# Patient Record
Sex: FEMALE | Race: ASIAN | Employment: OTHER | URBAN - METROPOLITAN AREA
[De-identification: names, ages, dates, MRNs, and addresses within clinical notes are randomized per-mention and may not be internally consistent; named-entity substitution may affect disease eponyms.]

---

## 2017-04-07 ENCOUNTER — APPOINTMENT (OUTPATIENT)
Dept: CT IMAGING | Facility: CLINIC | Age: 77
DRG: 282 | End: 2017-04-07
Attending: EMERGENCY MEDICINE
Payer: COMMERCIAL

## 2017-04-07 ENCOUNTER — HOSPITAL ENCOUNTER (INPATIENT)
Facility: CLINIC | Age: 77
LOS: 3 days | Discharge: HOME | DRG: 282 | End: 2017-04-10
Attending: EMERGENCY MEDICINE | Admitting: INTERNAL MEDICINE
Payer: COMMERCIAL

## 2017-04-07 DIAGNOSIS — I30.9 ACUTE PERICARDITIS, UNSPECIFIED TYPE: Primary | ICD-10-CM

## 2017-04-07 DIAGNOSIS — R79.89 ELEVATED TROPONIN: ICD-10-CM

## 2017-04-07 DIAGNOSIS — R07.9 CHEST PAIN, UNSPECIFIED TYPE: ICD-10-CM

## 2017-04-07 DIAGNOSIS — I10 ESSENTIAL HYPERTENSION: ICD-10-CM

## 2017-04-07 DIAGNOSIS — K21.9 GASTROESOPHAGEAL REFLUX DISEASE WITHOUT ESOPHAGITIS: ICD-10-CM

## 2017-04-07 LAB
ALBUMIN SERPL-MCNC: 2.8 G/DL (ref 3.4–5)
ALBUMIN UR-MCNC: NEGATIVE MG/DL
ALP SERPL-CCNC: 64 U/L (ref 40–150)
ALT SERPL W P-5'-P-CCNC: 18 U/L (ref 0–50)
ANION GAP SERPL CALCULATED.3IONS-SCNC: 9 MMOL/L (ref 3–14)
APPEARANCE UR: CLEAR
AST SERPL W P-5'-P-CCNC: 18 U/L (ref 0–45)
BASOPHILS # BLD AUTO: 0 10E9/L (ref 0–0.2)
BASOPHILS NFR BLD AUTO: 0.1 %
BILIRUB SERPL-MCNC: 0.5 MG/DL (ref 0.2–1.3)
BILIRUB UR QL STRIP: NEGATIVE
BUN SERPL-MCNC: 16 MG/DL (ref 7–30)
CALCIUM SERPL-MCNC: 8.4 MG/DL (ref 8.5–10.1)
CHLORIDE SERPL-SCNC: 103 MMOL/L (ref 94–109)
CO2 SERPL-SCNC: 27 MMOL/L (ref 20–32)
COLOR UR AUTO: YELLOW
CREAT SERPL-MCNC: 0.66 MG/DL (ref 0.52–1.04)
DIFFERENTIAL METHOD BLD: NORMAL
EOSINOPHIL # BLD AUTO: 0.1 10E9/L (ref 0–0.7)
EOSINOPHIL NFR BLD AUTO: 1.6 %
ERYTHROCYTE [DISTWIDTH] IN BLOOD BY AUTOMATED COUNT: 12.9 % (ref 10–15)
GFR SERPL CREATININE-BSD FRML MDRD: 88 ML/MIN/1.7M2
GLUCOSE SERPL-MCNC: 226 MG/DL (ref 70–99)
GLUCOSE UR STRIP-MCNC: NEGATIVE MG/DL
HCT VFR BLD AUTO: 36 % (ref 35–47)
HGB BLD-MCNC: 11.9 G/DL (ref 11.7–15.7)
HGB UR QL STRIP: NEGATIVE
IMM GRANULOCYTES # BLD: 0 10E9/L (ref 0–0.4)
IMM GRANULOCYTES NFR BLD: 0.4 %
INR PPP: 0.92 (ref 0.86–1.14)
KETONES UR STRIP-MCNC: NEGATIVE MG/DL
LEUKOCYTE ESTERASE UR QL STRIP: NEGATIVE
LIPASE SERPL-CCNC: 369 U/L (ref 73–393)
LYMPHOCYTES # BLD AUTO: 1.8 10E9/L (ref 0.8–5.3)
LYMPHOCYTES NFR BLD AUTO: 21.9 %
MCH RBC QN AUTO: 29.7 PG (ref 26.5–33)
MCHC RBC AUTO-ENTMCNC: 33.1 G/DL (ref 31.5–36.5)
MCV RBC AUTO: 90 FL (ref 78–100)
MONOCYTES # BLD AUTO: 0.7 10E9/L (ref 0–1.3)
MONOCYTES NFR BLD AUTO: 7.9 %
MUCOUS THREADS #/AREA URNS LPF: PRESENT /LPF
NEUTROPHILS # BLD AUTO: 5.7 10E9/L (ref 1.6–8.3)
NEUTROPHILS NFR BLD AUTO: 68.1 %
NITRATE UR QL: NEGATIVE
NRBC # BLD AUTO: 0 10*3/UL
NRBC BLD AUTO-RTO: 0 /100
NT-PROBNP SERPL-MCNC: 307 PG/ML (ref 0–1800)
PH UR STRIP: 7 PH (ref 5–7)
PLATELET # BLD AUTO: 178 10E9/L (ref 150–450)
POTASSIUM SERPL-SCNC: 3.1 MMOL/L (ref 3.4–5.3)
PROT SERPL-MCNC: 7 G/DL (ref 6.8–8.8)
RBC # BLD AUTO: 4.01 10E12/L (ref 3.8–5.2)
RBC #/AREA URNS AUTO: <1 /HPF (ref 0–2)
SODIUM SERPL-SCNC: 139 MMOL/L (ref 133–144)
SP GR UR STRIP: 1.01 (ref 1–1.03)
TROPONIN I SERPL-MCNC: 0.1 UG/L (ref 0–0.04)
URN SPEC COLLECT METH UR: ABNORMAL
UROBILINOGEN UR STRIP-MCNC: 0 MG/DL (ref 0–2)
WBC # BLD AUTO: 8.4 10E9/L (ref 4–11)
WBC #/AREA URNS AUTO: 4 /HPF (ref 0–2)

## 2017-04-07 PROCEDURE — 81001 URINALYSIS AUTO W/SCOPE: CPT | Performed by: EMERGENCY MEDICINE

## 2017-04-07 PROCEDURE — 84484 ASSAY OF TROPONIN QUANT: CPT | Performed by: EMERGENCY MEDICINE

## 2017-04-07 PROCEDURE — 96375 TX/PRO/DX INJ NEW DRUG ADDON: CPT

## 2017-04-07 PROCEDURE — 93005 ELECTROCARDIOGRAM TRACING: CPT

## 2017-04-07 PROCEDURE — 99285 EMERGENCY DEPT VISIT HI MDM: CPT | Mod: 25

## 2017-04-07 PROCEDURE — 25500064 ZZH RX 255 OP 636: Performed by: EMERGENCY MEDICINE

## 2017-04-07 PROCEDURE — 83690 ASSAY OF LIPASE: CPT | Performed by: EMERGENCY MEDICINE

## 2017-04-07 PROCEDURE — 96361 HYDRATE IV INFUSION ADD-ON: CPT

## 2017-04-07 PROCEDURE — 74177 CT ABD & PELVIS W/CONTRAST: CPT

## 2017-04-07 PROCEDURE — 83880 ASSAY OF NATRIURETIC PEPTIDE: CPT | Performed by: EMERGENCY MEDICINE

## 2017-04-07 PROCEDURE — 25000132 ZZH RX MED GY IP 250 OP 250 PS 637: Performed by: EMERGENCY MEDICINE

## 2017-04-07 PROCEDURE — 85610 PROTHROMBIN TIME: CPT | Performed by: EMERGENCY MEDICINE

## 2017-04-07 PROCEDURE — 71260 CT THORAX DX C+: CPT

## 2017-04-07 PROCEDURE — 12000007 ZZH R&B INTERMEDIATE

## 2017-04-07 PROCEDURE — 25000128 H RX IP 250 OP 636: Performed by: EMERGENCY MEDICINE

## 2017-04-07 PROCEDURE — 85025 COMPLETE CBC W/AUTO DIFF WBC: CPT | Performed by: EMERGENCY MEDICINE

## 2017-04-07 PROCEDURE — 80053 COMPREHEN METABOLIC PANEL: CPT | Performed by: EMERGENCY MEDICINE

## 2017-04-07 PROCEDURE — 96374 THER/PROPH/DIAG INJ IV PUSH: CPT

## 2017-04-07 RX ORDER — POTASSIUM CHLORIDE 1500 MG/1
20-40 TABLET, EXTENDED RELEASE ORAL
Status: DISCONTINUED | OUTPATIENT
Start: 2017-04-07 | End: 2017-04-10 | Stop reason: HOSPADM

## 2017-04-07 RX ORDER — PROCHLORPERAZINE 25 MG
12.5 SUPPOSITORY, RECTAL RECTAL EVERY 12 HOURS PRN
Status: DISCONTINUED | OUTPATIENT
Start: 2017-04-07 | End: 2017-04-10 | Stop reason: HOSPADM

## 2017-04-07 RX ORDER — CODEINE PHOSPHATE AND GUAIFENESIN 10; 100 MG/5ML; MG/5ML
1-2 SOLUTION ORAL 3 TIMES DAILY PRN
Status: ON HOLD | COMMUNITY
End: 2017-11-02

## 2017-04-07 RX ORDER — ONDANSETRON 2 MG/ML
4 INJECTION INTRAMUSCULAR; INTRAVENOUS EVERY 30 MIN PRN
Status: DISCONTINUED | OUTPATIENT
Start: 2017-04-07 | End: 2017-04-08 | Stop reason: CLARIF

## 2017-04-07 RX ORDER — ZOLPIDEM TARTRATE 5 MG/1
5 TABLET ORAL
Status: DISCONTINUED | OUTPATIENT
Start: 2017-04-07 | End: 2017-04-10 | Stop reason: HOSPADM

## 2017-04-07 RX ORDER — ACETAMINOPHEN 325 MG/1
325-650 TABLET ORAL EVERY 4 HOURS PRN
Status: DISCONTINUED | OUTPATIENT
Start: 2017-04-07 | End: 2017-04-10 | Stop reason: HOSPADM

## 2017-04-07 RX ORDER — ERLOTINIB HYDROCHLORIDE 100 MG/1
100 TABLET, FILM COATED ORAL EVERY OTHER DAY
COMMUNITY

## 2017-04-07 RX ORDER — BISACODYL 5 MG
5-15 TABLET, DELAYED RELEASE (ENTERIC COATED) ORAL DAILY PRN
Status: DISCONTINUED | OUTPATIENT
Start: 2017-04-07 | End: 2017-04-10 | Stop reason: HOSPADM

## 2017-04-07 RX ORDER — CHLORAL HYDRATE 500 MG
1 CAPSULE ORAL DAILY
COMMUNITY

## 2017-04-07 RX ORDER — ALBUTEROL SULFATE 90 UG/1
1-2 AEROSOL, METERED RESPIRATORY (INHALATION) EVERY 4 HOURS PRN
COMMUNITY

## 2017-04-07 RX ORDER — ASPIRIN 325 MG
325 TABLET ORAL ONCE
Status: COMPLETED | OUTPATIENT
Start: 2017-04-07 | End: 2017-04-07

## 2017-04-07 RX ORDER — MORPHINE SULFATE 4 MG/ML
4 INJECTION, SOLUTION INTRAMUSCULAR; INTRAVENOUS
Status: DISCONTINUED | OUTPATIENT
Start: 2017-04-07 | End: 2017-04-08 | Stop reason: CLARIF

## 2017-04-07 RX ORDER — HYDROMORPHONE HCL/0.9% NACL/PF 0.2MG/0.2
0.2 SYRINGE (ML) INTRAVENOUS
Status: DISCONTINUED | OUTPATIENT
Start: 2017-04-07 | End: 2017-04-10 | Stop reason: HOSPADM

## 2017-04-07 RX ORDER — CETIRIZINE HYDROCHLORIDE 10 MG/1
10 TABLET ORAL DAILY
Status: DISCONTINUED | OUTPATIENT
Start: 2017-04-08 | End: 2017-04-10 | Stop reason: HOSPADM

## 2017-04-07 RX ORDER — PROCHLORPERAZINE MALEATE 5 MG
5 TABLET ORAL EVERY 6 HOURS PRN
Status: DISCONTINUED | OUTPATIENT
Start: 2017-04-07 | End: 2017-04-10 | Stop reason: HOSPADM

## 2017-04-07 RX ORDER — POTASSIUM CHLORIDE 7.45 MG/ML
10 INJECTION INTRAVENOUS
Status: DISCONTINUED | OUTPATIENT
Start: 2017-04-07 | End: 2017-04-10 | Stop reason: HOSPADM

## 2017-04-07 RX ORDER — SODIUM CHLORIDE 9 MG/ML
1000 INJECTION, SOLUTION INTRAVENOUS CONTINUOUS
Status: DISCONTINUED | OUTPATIENT
Start: 2017-04-07 | End: 2017-04-08 | Stop reason: CLARIF

## 2017-04-07 RX ORDER — FLUTICASONE PROPIONATE 50 MCG
2 SPRAY, SUSPENSION (ML) NASAL DAILY PRN
COMMUNITY

## 2017-04-07 RX ORDER — MAGNESIUM SULFATE HEPTAHYDRATE 40 MG/ML
4 INJECTION, SOLUTION INTRAVENOUS EVERY 4 HOURS PRN
Status: DISCONTINUED | OUTPATIENT
Start: 2017-04-07 | End: 2017-04-10 | Stop reason: HOSPADM

## 2017-04-07 RX ORDER — CETIRIZINE HYDROCHLORIDE 10 MG/1
10 TABLET ORAL DAILY PRN
COMMUNITY

## 2017-04-07 RX ORDER — ALBUTEROL SULFATE 90 UG/1
1-2 AEROSOL, METERED RESPIRATORY (INHALATION) EVERY 4 HOURS PRN
Status: DISCONTINUED | OUTPATIENT
Start: 2017-04-07 | End: 2017-04-10 | Stop reason: HOSPADM

## 2017-04-07 RX ORDER — NALOXONE HYDROCHLORIDE 0.4 MG/ML
.1-.4 INJECTION, SOLUTION INTRAMUSCULAR; INTRAVENOUS; SUBCUTANEOUS
Status: DISCONTINUED | OUTPATIENT
Start: 2017-04-07 | End: 2017-04-10 | Stop reason: HOSPADM

## 2017-04-07 RX ORDER — LIDOCAINE 40 MG/G
CREAM TOPICAL
Status: DISCONTINUED | OUTPATIENT
Start: 2017-04-07 | End: 2017-04-07

## 2017-04-07 RX ORDER — POTASSIUM CHLORIDE 29.8 MG/ML
20 INJECTION INTRAVENOUS
Status: DISCONTINUED | OUTPATIENT
Start: 2017-04-07 | End: 2017-04-10 | Stop reason: HOSPADM

## 2017-04-07 RX ORDER — ONDANSETRON 2 MG/ML
4 INJECTION INTRAMUSCULAR; INTRAVENOUS EVERY 6 HOURS PRN
Status: DISCONTINUED | OUTPATIENT
Start: 2017-04-07 | End: 2017-04-10 | Stop reason: HOSPADM

## 2017-04-07 RX ORDER — IOPAMIDOL 755 MG/ML
500 INJECTION, SOLUTION INTRAVASCULAR ONCE
Status: COMPLETED | OUTPATIENT
Start: 2017-04-07 | End: 2017-04-07

## 2017-04-07 RX ORDER — POLYETHYLENE GLYCOL 3350 17 G/17G
17 POWDER, FOR SOLUTION ORAL DAILY PRN
Status: DISCONTINUED | OUTPATIENT
Start: 2017-04-07 | End: 2017-04-10 | Stop reason: HOSPADM

## 2017-04-07 RX ORDER — FLUTICASONE PROPIONATE 50 MCG
2 SPRAY, SUSPENSION (ML) NASAL DAILY PRN
Status: DISCONTINUED | OUTPATIENT
Start: 2017-04-07 | End: 2017-04-10 | Stop reason: HOSPADM

## 2017-04-07 RX ORDER — ZOLPIDEM TARTRATE 5 MG/1
5 TABLET ORAL
COMMUNITY
End: 2019-07-09

## 2017-04-07 RX ORDER — CODEINE PHOSPHATE AND GUAIFENESIN 10; 100 MG/5ML; MG/5ML
5-10 SOLUTION ORAL 3 TIMES DAILY PRN
Status: DISCONTINUED | OUTPATIENT
Start: 2017-04-07 | End: 2017-04-10 | Stop reason: HOSPADM

## 2017-04-07 RX ORDER — ONDANSETRON 4 MG/1
4 TABLET, ORALLY DISINTEGRATING ORAL EVERY 6 HOURS PRN
Status: DISCONTINUED | OUTPATIENT
Start: 2017-04-07 | End: 2017-04-10 | Stop reason: HOSPADM

## 2017-04-07 RX ORDER — POTASSIUM CHLORIDE 1.5 G/1.58G
20-40 POWDER, FOR SOLUTION ORAL
Status: DISCONTINUED | OUTPATIENT
Start: 2017-04-07 | End: 2017-04-10 | Stop reason: HOSPADM

## 2017-04-07 RX ORDER — ASPIRIN 81 MG/1
81 TABLET ORAL DAILY
Status: DISCONTINUED | OUTPATIENT
Start: 2017-04-08 | End: 2017-04-10 | Stop reason: HOSPADM

## 2017-04-07 RX ADMIN — SODIUM CHLORIDE 1000 ML: 9 INJECTION, SOLUTION INTRAVENOUS at 19:07

## 2017-04-07 RX ADMIN — ONDANSETRON 4 MG: 2 INJECTION INTRAMUSCULAR; INTRAVENOUS at 19:20

## 2017-04-07 RX ADMIN — ASPIRIN 325 MG ORAL TABLET 325 MG: 325 PILL ORAL at 21:47

## 2017-04-07 RX ADMIN — SODIUM CHLORIDE 74 ML: 9 INJECTION, SOLUTION INTRAVENOUS at 21:02

## 2017-04-07 RX ADMIN — MORPHINE SULFATE 4 MG: 4 INJECTION, SOLUTION INTRAMUSCULAR; INTRAVENOUS at 19:20

## 2017-04-07 RX ADMIN — IOPAMIDOL 56 ML: 755 INJECTION, SOLUTION INTRAVENOUS at 21:00

## 2017-04-07 ASSESSMENT — ENCOUNTER SYMPTOMS
DIARRHEA: 0
COUGH: 1
SHORTNESS OF BREATH: 1
FEVER: 0
NAUSEA: 0
VOMITING: 0

## 2017-04-07 NOTE — ED PROVIDER NOTES
History     Chief Complaint:  Chest Pain and Shortness of Breath     HPI The history is obtained through interpretations provided by the patient's daughter.     Won Huggins is a 76 year old female with a history of hypertension and a previous CVA  who presents for evaluation of chest pain and shortness of breath. On 4/6/2017 around 1200, the patient was resting in bed when she started to develop intermittent chest pain and shortness of breath. Her chest pain is worse with deep breathing and when stepping. It is somewhat improved when sitting upright. She has been taking tylenol at home without improvement of her pain. Due to her ongoing pain today, the patient chose to seek evaluation in the ED. She has had a slight cough recently. Otherwise, she had not had any fever, nausea, vomiting, or diarrhea in association with her current symptoms. Notably, the patient is currently being treated for a UTI with a course of Cipro. She also had a CT chest to evaluate complaints of exertional shortness of breath on 3/31 that was unremarkable. Details regarding this study are as below.     CT Angio Chest w IV Contrast PE Study 3/30/2017:  IMPRESSION:  1. No evidence of pulmonary emboli.  2. No acute infiltrate.  3. Compression deformity at L1.    Allergies:  NKDA      Medications:    Fish oil  Zyrtec  Aspirin  tylenol  Lidoderm patches  Gabapentin  Hydrochlorothiazide  Albuterol inhaler  Ambien  Flonase  Cipro     Past Medical History:    Colon cancer  Lung cancer  CVA  Hypertension   PE    Past Surgical History:    Hysterectomy  Carpal tunnel release  Wrist surgery  Hip fracture tx   Cataract removal with implant   Colostomy   Right lower lobectomy and lymphadenectomy   Eye surgery     Family History:    History reviewed. No pertinent family history.     Social History:  Tobacco use:    Never smoker  Alcohol use:    Negative  Marital status:    Single   Accompanied to ED by:  Daughter      Review of Systems   Constitutional:  Negative for fever.   Respiratory: Positive for cough and shortness of breath.    Cardiovascular: Positive for chest pain.   Gastrointestinal: Negative for diarrhea, nausea and vomiting.   All other systems reviewed and are negative.    Physical Exam   First Vitals:  BP: 153/86  Pulse: 73  Temp: 97.4  F (36.3  C)  Resp: 24  Weight: 56.7 kg (125 lb)  SpO2: 100 %      Physical Exam   Constitutional: She appears well-developed and well-nourished.   HENT:   Right Ear: External ear normal.   Left Ear: External ear normal.   Mouth/Throat: Oropharynx is clear and moist. No oropharyngeal exudate.   TM's clear bilaterally   Eyes: Conjunctivae are normal. Pupils are equal, round, and reactive to light. No scleral icterus.   Neck: Normal range of motion. Neck supple. No JVD present.   Cardiovascular: Normal rate, regular rhythm, normal heart sounds and intact distal pulses.  Exam reveals no gallop and no friction rub.    No murmur heard.  Pulmonary/Chest: Effort normal and breath sounds normal. No respiratory distress. She has no wheezes. She has no rales. She exhibits tenderness (left lower ribcage).   Abdominal: Soft. Bowel sounds are normal. She exhibits no distension and no mass. There is tenderness (left upper quadrant and epigastric ). There is no rebound and no guarding.   Musculoskeletal: Normal range of motion. She exhibits no edema.   Neurological: She is alert. No cranial nerve deficit.   Skin: Skin is warm and dry. No rash noted.   Psychiatric: She has a normal mood and affect.     Emergency Department Course   ECG (18:27:31):  Indication: Screening for cardiovascular disease.   Rate 71 bpm. UT interval 158 ms. QRS duration 76 ms. QT/QTc 410/445 ms. P-R-T axes 43 -48 48.   Interpretation: Normal sinus rhythm, Left axis deviation, Abnormal ECG  Agree with computer interpretation. Yes   Interpreted at 1828 by Dr. Lucero.      Imaging:  Radiographic findings were communicated with the patient who voiced understanding  of the findings.    CT Chest/Abdomen/Pelvis w Contrast:  IMPRESSION:  1. No CT evidence of pulmonary embolism.  2. Coronary artery and aortic calcifications are noted.  3. Bilateral lower lobe fibrotic changes. The lungs are otherwise grossly clear.  4. No CT evidence of an acute inflammatory process in the abdomen or pelvis. No evidence of bowel obstruction.  Preliminary report per radiology.     Laboratory:  CBC: WNL (WBC 8.4, HGB 11.9, )    CMP: Potassium 3.1 low, Glucose 226 high, Calcium 8.4 low, Albumin 2.8 low, o/w WNL (Creatinine 0.66)  INR: 0.92  Lipase: 369  Troponin I 1900: 0.095  Nt probnp inpatient: 307  UA with Microscopic: WBC 4 high, Mucous present, o/w Negative     Interventions:  1907 NS 1,000 mL IV  1920 Morphine 4 mg IV  1920 Zofran 4 mg IV   2147 Aspirin 325 mg PO    Emergency Department Course:  Nursing notes and vitals reviewed.  1837: I performed an exam of the patient as documented above.     2213: I spoke with Dr. Osorio of the hospitalist service regarding patient's presentation, findings, and plan of care.    Findings and plan explained to the Patient and daughter who consents to admission. Discussed the patient with Dr. Osorio, who will admit the patient to a cardiac telemetry bed for further monitoring, evaluation, and treatment.     Impression & Plan      Medical Decision Making:  Won Huggins is a 76 year old female who had left upper quadrant pain and almost left-sided pleuritic pain. She was tender in her rib cage and she was also better with sitting up. Her troponin is elevated although I do not see any EKG changes to suggest acute coronary syndrome to activate the cath lab, but she was also ruled out for PE due to a history of being on Coumadin for PE. Her elevated troponin does need further evaluation.  Her chest pain sounded pleuritic. There was no evidence of pericardial effusion on CT chest.  Otherwise, she was comfortable and she was given aspirin and admitted to medical  telemetry under Dr. Osorio for further evaluation.     Diagnosis:    ICD-10-CM   1. Elevated troponin R79.89   2. Chest pain, unspecified type R07.9       Disposition:  Admitted to Dr. Osorio.     I, Nick Ash, am serving as a scribe at 6:37 PM on 4/7/2017 to document services personally performed by Dr. Lucero, based on my observations and the provider's statements to me.     [facility] EMERGENCY DEPARTMENT       Shiela Lucero MD  04/08/17 0109

## 2017-04-07 NOTE — IP AVS SNAPSHOT
MRN:[id number]                      After Visit Summary   4/7/2017    Te Huggins    MRN: [id number]           Thank you!     Thank you for choosing [facility] for your care. Our goal is always to provide you with excellent care. Hearing back from our patients is one way we can continue to improve our services. Please take a few minutes to complete the written survey that you may receive in the mail after you visit. If you would like to speak to someone directly about your visit please contact Patient Relations at [phone]. Thank you!          Patient Information     Date Of Birth          1940        Designated Caregiver       Most Recent Value    Caregiver    Will someone help with your care after discharge? yes    Name of designated caregiver Agueda Brownlee    Phone number of caregiver      Caregiver address [id number] [address] [facility]       About your hospital stay     You were admitted on:  April 7, 2017 You last received care in the:  [place] [address] [facility]    You were discharged on:  April 10, 2017        Reason for your hospital stay       Chest pain, elevated troponin, possible pericarditis.                  Who to Call     For medical emergencies, please call 911.  For non-urgent questions about your medical care, please call your primary care provider or clinic, None          Attending Provider     Provider Specialty    Shiela Lucero MD Emergency Medicine    Abdullahi Osorio MD Internal Medicine       Primary Care Provider Fax #    [facility] [phone]       No address on file        After Care Instructions     Activity       Your activity upon discharge: activity as tolerated            Diet       Follow this diet upon discharge: Orders Placed This Encounter      Low Saturated Fat Na <2400 mg                  Follow-up Appointments     Follow-up and recommended labs and tests        Follow up with primary care  provider, [facility], within 7 days for hospital follow- up.                  Your next 10 appointments already scheduled     Apr 10, 2017  4:00 PM [facility] Inpatient with [employer]    Services Department ([facility])    [address] [place] [id number] [phone]            Apr 11, 2017  8:30 AM [facility] Inpatient with [employer]    Services Department ([facility])    [address] [place] [address]   [phone]            Apr 12, 2017  8:30 AM [facility] Inpatient with [employer]    Services Department ([facility])    [address] [place] [address]   [phone]            Apr 13, 2017  8:30 AM [facility] Inpatient with [employer]    Services Department ([facility])    [address] [place] [id number] [phone]              Pending Results     Date and Time Order Name Status Description    4/8/2017 0513 EKG 12-lead, complete Preliminary             Statement of Approval     Ordered          04/10/17 1525  I have reviewed and agree with all the recommendations and orders detailed in this document.  EFFECTIVE NOW     Approved and electronically signed by:  Abdullahi Osorio MD             Admission Information     Date & Time Provider Department Dept. Phone    4/7/2017 Abdullahi Osorio MD [place] [address] [facility] [phone]      Your Vitals Were     Blood Pressure Pulse Temperature Respirations Height Weight    159/78 64 95.7  F (35.4  C) (Oral) 18 1.524 m (5') 55.4 kg (122 lb 3.2 oz)    Pulse Oximetry BMI (Body Mass Index)                97% 23.87 kg/m2      "     MyChart Information     [facility] lets you send messages to your doctor, view your test results, renew your prescriptions, schedule appointments and more. To sign up, go to [email or web address] . Click on \"Log in\" on the left side of the screen, which will take you to the Welcome page. Then click on \"Sign up Now\" on the right side of the page.     You will be asked to enter the access code listed below, as well as some personal information. Please follow the directions to create your username and password.     Your access code is: [email or web address]  Expires: 2017  3:40 PM     Your access code will  in 90 days. If you need help or a new code, please call your [facility] clinic or [phone].        Care EveryWhere ID     This is your Care EveryWhere ID. This could be used by other organizations to access your [facility] medical records  [id number]           Review of your medicines      START taking        Dose / Directions    indomethacin 25 MG capsule   Commonly known as:  INDOCIN   Used for:  Acute pericarditis, unspecified type        Dose:  25 mg   Take 1 capsule (25 mg) by mouth 3 times daily (with meals) for 5 days   Quantity:  15 capsule   Refills:  0       metoprolol 25 MG tablet   Commonly known as:  LOPRESSOR   Used for:  Chest pain, unspecified type, Essential hypertension        Dose:  25 mg   Take 1 tablet (25 mg) by mouth 2 times daily   Quantity:  60 tablet   Refills:  0       ranitidine 150 MG tablet   Commonly known as:  ZANTAC   Used for:  Gastroesophageal reflux disease without esophagitis        Dose:  150 mg   Take 1 tablet (150 mg) by mouth 2 times daily   Quantity:  30 tablet   Refills:  0         CONTINUE these medicines which have NOT CHANGED        Dose / Directions    albuterol 108 (90 BASE) MCG/ACT Inhaler   Commonly known as:  PROAIR HFA/PROVENTIL HFA/VENTOLIN HFA        Dose:  1-2 puff   Inhale 1-2 puffs into the lungs every 4 hours as needed for shortness of breath / dyspnea " or wheezing   Refills:  0       aspirin 81 MG EC tablet        Dose:  81 mg   Take 81 mg by mouth daily   Refills:  0       cetirizine 10 MG tablet   Commonly known as:  zyrTEC        Dose:  10 mg   Take 10 mg by mouth daily   Refills:  0       fish oil-omega-3 fatty acids 1000 MG capsule        Dose:  1 g   Take 1 g by mouth daily   Refills:  0       fluticasone 50 MCG/ACT spray   Commonly known as:  FLONASE        Dose:  2 spray   Spray 2 sprays into both nostrils daily as needed for rhinitis or allergies   Refills:  0       guaiFENesin-codeine 100-10 MG/5ML Soln solution   Commonly known as:  ROBITUSSIN AC        Dose:  1-2 tsp.   Take 1-2 tsp. by mouth 3 times daily as needed for cough   Refills:  0       HYDROcodone-acetaminophen 5-325 MG per tablet   Commonly known as:  NORCO        Dose:  1-2 tablet   Take 1-2 tablets by mouth every 4 hours as needed for moderate to severe pain   Quantity:  15 tablet   Refills:  0       TARCEVA 100 MG Tabs   Generic drug:  erlotinib   Notes to Patient:  See directions        Dose:  100 mg %   Take 100 mg % by mouth every other day   Refills:  0       TYLENOL PO        Dose:  325-650 mg   Take 325-650 mg by mouth every 4 hours as needed for mild pain or fever   Refills:  0       zolpidem 5 MG tablet   Commonly known as:  AMBIEN        Dose:  5 mg   Take 5 mg by mouth nightly as needed for sleep   Refills:  0            Where to get your medicines      Some of these will need a paper prescription and others can be bought over the counter. Ask your nurse if you have questions.     Bring a paper prescription for each of these medications     indomethacin 25 MG capsule    metoprolol 25 MG tablet    ranitidine 150 MG tablet                Protect others around you: Learn how to safely use, store and throw away your medicines at www.disposemymeds.org.             Medication List: This is a list of all your medications and when to take them. Check marks below indicate your daily home  schedule. Keep this list as a reference.      Medications           Morning Afternoon Evening Bedtime As Needed    albuterol 108 (90 BASE) MCG/ACT Inhaler   Commonly known as:  PROAIR HFA/PROVENTIL HFA/VENTOLIN HFA   Inhale 1-2 puffs into the lungs every 4 hours as needed for shortness of breath / dyspnea or wheezing                                   aspirin 81 MG EC tablet   Take 81 mg by mouth daily   Last time this was given:  81 mg on 4/10/2017 12:02 PM                                   cetirizine 10 MG tablet   Commonly known as:  zyrTEC   Take 10 mg by mouth daily   Last time this was given:  10 mg on 4/10/2017 12:01 PM                                   fish oil-omega-3 fatty acids 1000 MG capsule   Take 1 g by mouth daily   Last time this was given:  1 g on 4/10/2017 12:01 PM                                   fluticasone 50 MCG/ACT spray   Commonly known as:  FLONASE   Spray 2 sprays into both nostrils daily as needed for rhinitis or allergies                                   guaiFENesin-codeine 100-10 MG/5ML Soln solution   Commonly known as:  ROBITUSSIN AC   Take 1-2 tsp. by mouth 3 times daily as needed for cough                                   HYDROcodone-acetaminophen 5-325 MG per tablet   Commonly known as:  NORCO   Take 1-2 tablets by mouth every 4 hours as needed for moderate to severe pain                                   indomethacin 25 MG capsule   Commonly known as:  INDOCIN   Take 1 capsule (25 mg) by mouth 3 times daily (with meals) for 5 days   Last time this was given:  25 mg on 4/10/2017 12:02 PM                                         metoprolol 25 MG tablet   Commonly known as:  LOPRESSOR   Take 1 tablet (25 mg) by mouth 2 times daily   Last time this was given:  12.5 mg on 4/10/2017 12:03 PM                                      ranitidine 150 MG tablet   Commonly known as:  ZANTAC   Take 1 tablet (150 mg) by mouth 2 times daily   Last time this was given:  150 mg on 4/10/2017 12:01 PM                                       TARCEVA 100 MG Tabs   Take 100 mg % by mouth every other day   Generic drug:  erlotinib   Notes to Patient:  See directions                                TYLENOL PO   Take 325-650 mg by mouth every 4 hours as needed for mild pain or fever   Last time this was given:  650 mg on 4/10/2017 12:00 PM                                   zolpidem 5 MG tablet   Commonly known as:  AMBIEN   Take 5 mg by mouth nightly as needed for sleep   Last time this was given:  5 mg on 4/9/2017  2:33 AM

## 2017-04-07 NOTE — IP AVS SNAPSHOT
[place] [address] [facility]    [address]    [facility] [address]    Phone:  [phone]    Fax:  [phone]                                       After Visit Summary   4/7/2017    Won Huggins    MRN: [id number]           After Visit Summary Signature Page     I have received my discharge instructions, and my questions have been answered. I have discussed any challenges I see with this plan with the nurse or doctor.    ..........................................................................................................................................  Patient/Patient Representative Signature      ..........................................................................................................................................  Patient Representative Print Name and Relationship to Patient    ..................................................               ................................................  Date                                            Time    ..........................................................................................................................................  Reviewed by Signature/Title    ...................................................              ..............................................  Date                                                            Time

## 2017-04-08 ENCOUNTER — APPOINTMENT (OUTPATIENT)
Dept: CARDIOLOGY | Facility: CLINIC | Age: 77
DRG: 282 | End: 2017-04-08
Attending: INTERNAL MEDICINE
Payer: COMMERCIAL

## 2017-04-08 LAB
INTERPRETATION ECG - MUSE: NORMAL
MAGNESIUM SERPL-MCNC: 1.8 MG/DL (ref 1.6–2.3)
TROPONIN I SERPL-MCNC: 0.1 UG/L (ref 0–0.04)
TROPONIN I SERPL-MCNC: 0.1 UG/L (ref 0–0.04)

## 2017-04-08 PROCEDURE — 40000275 ZZH STATISTIC RCP TIME EA 10 MIN

## 2017-04-08 PROCEDURE — 93010 ELECTROCARDIOGRAM REPORT: CPT | Performed by: INTERNAL MEDICINE

## 2017-04-08 PROCEDURE — 25000128 H RX IP 250 OP 636: Performed by: INTERNAL MEDICINE

## 2017-04-08 PROCEDURE — 84484 ASSAY OF TROPONIN QUANT: CPT | Performed by: INTERNAL MEDICINE

## 2017-04-08 PROCEDURE — 93005 ELECTROCARDIOGRAM TRACING: CPT

## 2017-04-08 PROCEDURE — 93306 TTE W/DOPPLER COMPLETE: CPT | Mod: 26 | Performed by: INTERNAL MEDICINE

## 2017-04-08 PROCEDURE — 12000007 ZZH R&B INTERMEDIATE

## 2017-04-08 PROCEDURE — 99207 ZZC APP CREDIT; MD BILLING SHARED VISIT: CPT | Performed by: INTERNAL MEDICINE

## 2017-04-08 PROCEDURE — 93306 TTE W/DOPPLER COMPLETE: CPT

## 2017-04-08 PROCEDURE — 83735 ASSAY OF MAGNESIUM: CPT | Performed by: INTERNAL MEDICINE

## 2017-04-08 PROCEDURE — 99222 1ST HOSP IP/OBS MODERATE 55: CPT | Performed by: INTERNAL MEDICINE

## 2017-04-08 PROCEDURE — 36415 COLL VENOUS BLD VENIPUNCTURE: CPT | Performed by: INTERNAL MEDICINE

## 2017-04-08 PROCEDURE — 25000132 ZZH RX MED GY IP 250 OP 250 PS 637: Performed by: INTERNAL MEDICINE

## 2017-04-08 PROCEDURE — 99223 1ST HOSP IP/OBS HIGH 75: CPT | Mod: AI | Performed by: INTERNAL MEDICINE

## 2017-04-08 RX ORDER — CHLORAL HYDRATE 500 MG
1 CAPSULE ORAL DAILY
Status: DISCONTINUED | OUTPATIENT
Start: 2017-04-08 | End: 2017-04-10 | Stop reason: HOSPADM

## 2017-04-08 RX ORDER — NITROGLYCERIN 0.4 MG/1
0.4 TABLET SUBLINGUAL EVERY 5 MIN PRN
Status: DISCONTINUED | OUTPATIENT
Start: 2017-04-08 | End: 2017-04-10 | Stop reason: HOSPADM

## 2017-04-08 RX ORDER — ATORVASTATIN CALCIUM 10 MG/1
10 TABLET, FILM COATED ORAL DAILY
Status: DISCONTINUED | OUTPATIENT
Start: 2017-04-08 | End: 2017-04-08

## 2017-04-08 RX ORDER — ATORVASTATIN CALCIUM 40 MG/1
40 TABLET, FILM COATED ORAL DAILY
Status: DISCONTINUED | OUTPATIENT
Start: 2017-04-09 | End: 2017-04-10 | Stop reason: HOSPADM

## 2017-04-08 RX ORDER — KETOROLAC TROMETHAMINE 30 MG/ML
15 INJECTION, SOLUTION INTRAMUSCULAR; INTRAVENOUS ONCE
Status: COMPLETED | OUTPATIENT
Start: 2017-04-08 | End: 2017-04-08

## 2017-04-08 RX ORDER — INDOMETHACIN 25 MG/1
25 CAPSULE ORAL
Status: DISCONTINUED | OUTPATIENT
Start: 2017-04-08 | End: 2017-04-10 | Stop reason: HOSPADM

## 2017-04-08 RX ADMIN — POTASSIUM CHLORIDE 20 MEQ: 1500 TABLET, EXTENDED RELEASE ORAL at 07:01

## 2017-04-08 RX ADMIN — ACETAMINOPHEN 650 MG: 325 TABLET, FILM COATED ORAL at 06:28

## 2017-04-08 RX ADMIN — NITROGLYCERIN 0.4 MG: 0.4 TABLET SUBLINGUAL at 05:38

## 2017-04-08 RX ADMIN — Medication 1 G: at 11:40

## 2017-04-08 RX ADMIN — ASPIRIN 81 MG: 81 TABLET, COATED ORAL at 09:13

## 2017-04-08 RX ADMIN — KETOROLAC TROMETHAMINE 15 MG: 30 INJECTION, SOLUTION INTRAMUSCULAR at 10:52

## 2017-04-08 RX ADMIN — POTASSIUM CHLORIDE 40 MEQ: 1500 TABLET, EXTENDED RELEASE ORAL at 05:01

## 2017-04-08 RX ADMIN — ATORVASTATIN CALCIUM 10 MG: 10 TABLET, FILM COATED ORAL at 09:13

## 2017-04-08 RX ADMIN — HYDROMORPHONE HYDROCHLORIDE 0.2 MG: 10 INJECTION, SOLUTION INTRAMUSCULAR; INTRAVENOUS; SUBCUTANEOUS at 15:39

## 2017-04-08 RX ADMIN — ENOXAPARIN SODIUM 80 MG: 80 INJECTION SUBCUTANEOUS at 00:32

## 2017-04-08 RX ADMIN — INDOMETHACIN 25 MG: 25 CAPSULE ORAL at 17:23

## 2017-04-08 RX ADMIN — METOPROLOL TARTRATE 12.5 MG: 25 TABLET, FILM COATED ORAL at 20:54

## 2017-04-08 RX ADMIN — CETIRIZINE HYDROCHLORIDE 10 MG: 10 TABLET, FILM COATED ORAL at 09:13

## 2017-04-08 RX ADMIN — HYDROMORPHONE HYDROCHLORIDE 0.2 MG: 10 INJECTION, SOLUTION INTRAMUSCULAR; INTRAVENOUS; SUBCUTANEOUS at 21:00

## 2017-04-08 RX ADMIN — INDOMETHACIN 25 MG: 25 CAPSULE ORAL at 14:35

## 2017-04-08 RX ADMIN — RANITIDINE HYDROCHLORIDE 150 MG: 150 TABLET, FILM COATED ORAL at 20:54

## 2017-04-08 RX ADMIN — METOPROLOL TARTRATE 12.5 MG: 25 TABLET, FILM COATED ORAL at 09:13

## 2017-04-08 ASSESSMENT — ACTIVITIES OF DAILY LIVING (ADL)
TRANSFERRING: 0-->INDEPENDENT
SWALLOWING: 0-->SWALLOWS FOODS/LIQUIDS WITHOUT DIFFICULTY
FALL_HISTORY_WITHIN_LAST_SIX_MONTHS: NO
TOILETING: 0-->INDEPENDENT
DRESS: 0-->INDEPENDENT
COGNITION: 0 - NO COGNITION ISSUES REPORTED
RETIRED_COMMUNICATION: 0-->UNDERSTANDS/COMMUNICATES WITHOUT DIFFICULTY
BATHING: 0-->INDEPENDENT
AMBULATION: 1-->ASSISTIVE EQUIPMENT
RETIRED_EATING: 0-->INDEPENDENT

## 2017-04-08 ASSESSMENT — PAIN DESCRIPTION - DESCRIPTORS: DESCRIPTORS: SHARP

## 2017-04-08 NOTE — PLAN OF CARE
"Problem: Goal Outcome Summary  Goal: Goal Outcome Summary  Outcome: No Change  Pain: Had c/o chest pain.  One time dose tordol given.  Had good pain relief from it.  LOC: alert and oriented.  Cambodian-speaking  Mobility: Independent in room.  Family at bedside.  Lungs: MACKAY, diminished.  Pain with deep breaths.  95% RA  Tele: SR  GI: Low fat diet.  Ostomy with 2-piece appliance left upper abd.  : voiding without difficulty  IV: PIV saline locked  Other:  phone at bedside.  Lexiscan ordered for Monday. Echo done today.  Cardiology following.     15:10 Text page sent to M.D. \"Pt had good relief from chest pain from tordol.  It was given at 10:52.  Pt states pain is returning now.  Is there another med or dose we can give?  thank you\"  MD returned call and stated to use the indocin.  (scheduled medication)             "

## 2017-04-08 NOTE — H&P
CHIEF COMPLAINT:  Chest pain and shortness of breath.      HISTORY OF PRESENT ILLNESS:  Won Huggins is a 76-year-old female with past medical history significant for hypertension, previous CVA, colon cancer, and lung cancer, who was relatively at her baseline state of health until yesterday when she started to have shortness of breath and chest pain.  The patient was resting in bed and started to develop intermittent chest pain.  Associated with that, she had shortness of breath.  The chest pain initially was worsening with activity and deep breathing, and improved with sitting.  She took pain medication, of which has Tylenol, which did not help.  She was concerned due to ongoing pain, and came here to the emergency room.  The patient denied any preceding runny nose or sore throat.  She denied any palpitation.  No fever, no chills.  No nausea, no vomiting or diarrhea.  The patient is being treated for a UTI with a course of ciprofloxacin.  She also had CT chest as an outpatient to evaluate for exertional shortness of breath on 03/31 at [facility], which did not show any acute pathology other than compression deformity of L1.      PAST MEDICAL HISTORY:   1.  Hypertension.   2.  History of pulmonary embolism.   3.  Lung cancer.   4.  Colon cancer.   5.  Stroke without significant residual deficit.      PAST SURGICAL HISTORY:   1.  Hysterectomy.   2.  Hip fracture.   3.  Colostomy.   4.  Right lower lobectomy.   5.  Eye surgery.      FAMILY HISTORY:  Reviewed, none.      SOCIAL HISTORY:  She does not smoke.  She does not drink alcohol.  She does not use illicit drugs.      REVIEW OF SYSTEMS:  Ten points reviewed.  All are negative except those mentioned in history of present illness.      HOME MEDICATIONS:   1.  Tarceva 100 mg p.o. daily.   2.  Fish oil 1000 mg p.o. daily.   3.  Tylenol 650 mg p.o. daily.   4.  Albuterol 1-2 puff inhalation every 6 hours.   5.  Aspirin 81 mg p.o. daily.   6.  Zyrtec 10 mg p.o. daily.    7.  Flonase 2 sprays both nostrils daily as needed.   8.  Robitussin 5-10 mL three times a day.   9.  Ambien 5 mg p.o. daily.      ALLERGIES:  No known drug allergies.      PHYSICAL EXAMINATION:   GENERAL:  The patient is awake, alert, oriented.  She is Cuban-speaking.   was her daughter at bedside.   VITAL SIGNS:  Blood pressure 131/62, pulse rate 66, temperature 96.0, oxygen saturation 96% on room air.   HEENT:  Pink, nonicteric.  Extraocular muscle movement intact.   NECK:  Supple.  No JVD.  No thyromegaly.   CHEST:  Good air entry bilaterally.  No wheezing, crackles or rales.  There is reproducible chest pain on the left side.   CARDIOVASCULAR:  S1 and S2 were heard.  No gallop or murmur.   ABDOMEN:  Soft, nontender, nondistended.  Positive bowel sounds.  No organomegaly.   EXTREMITIES:  No edema, cyanosis or clubbing.   NEUROLOGIC:  No focal neurologic deficit.  Cranial nerves grossly intact.   PSYCHIATRIC:  Normal mood and affect.  Keeps eye contact.  Responds to questions appropriately; used an , her daughter.      DIAGNOSTIC TESTS OF INTEREST:  EKG showed normal sinus rhythm at 71 beats per minute.  No left axis deviation.  No ST-T wave change, as reviewed by me.      CT chest, abdomen and pelvis:  No CT evidence of pulmonary embolism.  Coronary artery and aortic calcifications noted.  Fibrotic changes in the lung also noted.  No CT evidence of inflammatory process.      LABS:  Sodium 139, potassium 3.1, BUN 16, creatinine 0.6, calcium 8.4.  BNP is 307.  Troponin 0.095.  Glucose 226.  CBC showed WBC of 8.4, hematocrit 36.0, platelets 178,000.  INR is 0.9.  Urinalysis is negative.      ASSESSMENT:  Won Huggins is a 76-year-old female, Cuban speaking, who presented with history of hypertension, CVA, and also history of colon and lung cancer, who presented for evaluation of chest pain and shortness of breath, found to have elevated troponin, and is being admitted to the hospital for  further evaluation and management.         PLAN:   1.  Chest pain with elevated troponin:  Rule out acute coronary syndrome:  The patient got aspirin.  We will check lipid profile.  Given a dose of Lovenox 1.5 mg/kg for now and will cover her for 24 hours, further decision needs to be made by [facility].  EKG PRN.  Serial troponins.  Beta blocker if blood pressure allows.  Statin will be initiated.  Echocardiogram will be obtained.   2.  Hypokalemia:  Potassium will be replaced.   3.  Shortness of breath:  Etiology unclear.  There is no sign of pulmonary edema, no sign of infection, and she does not require oxygen.  The patient feels much better at this time.  Continue to monitor.  Would not treat with antibiotic at this point.  Will get bronchodilators as needed.  All of the patient's questions and her daughter's questions and concerns addressed.      CODE STATUS:  In the event of cardiorespiratory arrest, she is FULL CODE.         GABY MCLEOD MD             D: 2017 00:28   T: 2017 04:14   MT: GARRET#[id number]      Name:     MERRILL HENLEY   MRN:      [id number]        Account:      [id number]   :      1940           Admitted:     [id number]      Document: [id number]       cc: [facility]

## 2017-04-08 NOTE — PHARMACY-ADMISSION MEDICATION HISTORY
Admission medication history interview status for this patient is complete. See [facility] admission navigator for allergy information, prior to admission medications and immunization status.     Medication history interview source(s):Patient and Family  Medication history resources (including written lists, pill bottles, clinic record):med list  [facility]    Changes made to PTA medication list:  Added: all listed meds  Deleted: prednisone, zithromax, warfarin, lidoderm  Changed: -    Actions taken by pharmacist (provider contacted, etc):None     Additional medication history information:None    Medication reconciliation/reorder completed by provider prior to medication history? No    For patients on insulin therapy: No   Lantus/levemir/NPH/Mix 70/30 dose:  _____   in AM/PM  or twice daily   Sliding scale Novolog Y/N  If Yes, do you have a baseline novolog pre-meal dose:  ______units with meals   Patients eat three meals a day:   Y/N     Any Barriers to therapy:  cost of medications/comfortable with giving injections (if applicable)/ comfortable and confident with current diabetes regimen       Prior to Admission medications    Medication Sig Last Dose Taking? Auth Provider   Acetaminophen (TYLENOL PO) Take 325-650 mg by mouth every 4 hours as needed for mild pain or fever  Yes Unknown, Entered By History   albuterol (PROAIR HFA/PROVENTIL HFA/VENTOLIN HFA) 108 (90 BASE) MCG/ACT Inhaler Inhale 1-2 puffs into the lungs every 4 hours as needed for shortness of breath / dyspnea or wheezing  Yes Unknown, Entered By History   aspirin 81 MG EC tablet Take 81 mg by mouth daily 4/7/2017 at Unknown time Yes Unknown, Entered By History   cetirizine (ZYRTEC) 10 MG tablet Take 10 mg by mouth daily 4/7/2017 at Unknown time Yes Unknown, Entered By History   erlotinib (TARCEVA) 100 MG TABS Take 100 mg % by mouth every other day 4/7/2017 at Unknown time Yes Unknown, Entered By History   fluticasone (FLONASE) 50  MCG/ACT spray Spray 2 sprays into both nostrils daily as needed for rhinitis or allergies  Yes Unknown, Entered By History   fish oil-omega-3 fatty acids 1000 MG capsule Take 1 g by mouth daily 4/7/2017 at Unknown time Yes Unknown, Entered By History   zolpidem (AMBIEN) 5 MG tablet Take 5 mg by mouth nightly as needed for sleep  Yes Unknown, Entered By History   guaiFENesin-codeine (ROBITUSSIN AC) 100-10 MG/5ML SOLN solution Take 1-2 tsp. by mouth 3 times daily as needed for cough  Yes Unknown, Entered By History   HYDROcodone-acetaminophen (NORCO) 5-325 MG per tablet Take 1-2 tablets by mouth every 4 hours as needed for moderate to severe pain   Marlen Harper MD

## 2017-04-08 NOTE — PLAN OF CARE
Problem: Goal Outcome Summary  Goal: Goal Outcome Summary  Pt admitted from ER this shift. A/O x4. Cambodian speaking, dtr at bedside and  phone. Up independently in room. LS: clear. Tele: SB. NPO. Denies pain. Reviewed POC with pt and dtr.      At 0500 pt reported chest pain that is worsened with breathing and some SOB. EKG ordered, MD paged. Orders for Nitroglycerin placed. 1 dose given with some relief. Will continue to monitor.    At 0615, pt reported worsening MD MERLY paged. Gave PRN tylenol.

## 2017-04-08 NOTE — PROGRESS NOTES
No charge note     History and physical reviewed, patient continues to have 4-5/10 chest pain, worse with breathing and inspiration  Symptoms started 2 days ago, concerning for pleuritic or pericarditis, CT scan was negative for pulmonary embolism  Continue treatment for acute coronary syndrome per protocol, she received therapeutic dose of Lovenox last evening she should be covered until around noon, will defer to cardiology the need for heparin drip  Trial 1 dose of IV Toradol 50 mg and consider Indocin  Follow up on Echo just done

## 2017-04-08 NOTE — PROGRESS NOTES
Paged re patient with sudden onset chest pain.  Patient is non-english speaking, but per daughter the pain is worse this time.  -stat EKG  -will try sublingual nitroglycerin    ADDENDUM:  Reviewed EKG.  There is not acute ischemic change and it is similar to earlier.  Chest pain and sob improved with nitro.  SBP down to 120.  Patient has headache and requesting to sleep.  Of note patient only received the one dose of therapeutic lovenox at midnight so by noon or so rounding provider will need to decide if ongoing anticoagulation indicated.  Her troponin is elevated.

## 2017-04-08 NOTE — CONSULTS
CARDIOLOGY CONSULTATION      REQUESTING PROVIDER:  Dr. Abdullahi Osoroi.      INDICATION:  Elevated troponin and chest pain.      HISTORY OF PRESENT ILLNESS:  Ms. Won Huggins is a pleasant 76-year-old female with a past medical history significant for a prior CVA, colon cancer, lung cancer, and essential hypertension who had the onset of substernal chest discomfort with deep inspiration over the last several days.  Of note, the patient was being treated with antibiotics for what appears to be bronchitis last week.      The patient states that the chest pain only occurs when she takes in a deep breath or if she moves forward.  If she lies back the pain is better.  An EKG did not show any significant abnormalities and was clearly not consistent with an ischemic etiology; however, the patient's initial troponins have come back elevated.  Her initial troponin was 0.095 with a subsequent troponin coming back at 0.098.  A transthoracic echocardiogram is currently pending.      The remainder of the patient's blood work is relatively unremarkable with the exception of her glucose which was elevated at 226.      The patient underwent a CT scan of the chest which showed evidence for coronary artery calcifications.  There is no evidence of a pulmonary embolus.      PAST MEDICAL HISTORY:  Significant for:    1.  Essential hypertension.   2.  History of pulmonary embolism, I believe while the patient was being treated for cancer.   3.  History of treated lung cancer.   4.  History of treated colon cancer.   5.  History of prior CVA.   6.  Status post hysterectomy.   7.  History of hip fracture.   8.  Colostomy.   9.  Right lower lobe lobectomy.      FAMILY HISTORY:  Not significant for premature atherosclerotic disease.      SOCIAL HISTORY:  She is a lifelong nonsmoker.      ALLERGIES:  None.      CURRENT MEDICATIONS:  Reviewed in Epic, please see Epic for a full list of her current medications.      REVIEW OF SYSTEMS:  I did a  complete 10-point review of systems and is unremarkable except for what is stated in the HPI.      PHYSICAL EXAMINATION:   GENERAL:  This is an elderly female who appears younger than her stated age, in no acute distress.   VITAL SIGNS:  Temperature is 96.1, pulse is 60, blood pressure is 128/62.   NECK:  No JVD at a 45 degree angle.   HEART:  Regular rate and rhythm.  There are no appreciable murmurs, rubs or gallops.   LUNGS:  Clear.  There are no crackles or wheezing.   ABDOMEN:  Soft and nontender.   LOWER EXTREMITIES:  Shows trace to 1+ bilateral pitting edema below the knee.   NEUROLOGIC:  She is alert and oriented x3.   PSYCHIATRIC:  Her affect is normal.   MUSCULOSKELETAL:  Does not reveal any gross abnormalities related to joints.     DERM:  Exam does not reveal any rashes or ecchymoses on exposed areas of skin.      IMPRESSION AND PLAN:  Ms. Huggins is a pleasant 76-year-old female with probable asymptomatic coronary artery disease as detected on her computerized tomography scan of the chest which showed coronary artery calcification.  Her chest pain is quite atypical for cardiac ischemia; however, given her elevated troponins, I would risk stratify the patient with a nuclear stress test.  Unfortunately, we cannot complete this on the weekend, but I would recommend that the patient stay and have it completed on Monday.  In the interim, she has been started on medical therapy, but I will adjust her aspirin and statin to the proper dosages for underlying coronary artery disease.  Echocardiogram is currently pending, which I will follow up on.  Finally, her pain may be potentially consistent with pericarditis.  It may be beneficial for the Internal Medicine team to place the patient on NSAIDs if that is clinically safe to do so to see if there is an improvement in her chest discomfort.      Thank you for this consultation.  Cardiology will continue to follow along.         RITU BECERRIL MD             D:  2017 10:21   T: 2017 11:56   MT: GARRET#[id number]      Name:     MERRILL HENLEY   MRN:      [id number]        Account:       [id number]   :      1940           Consult Date:  2017      Document: [id number]       cc: Abdullahi Osorio MD

## 2017-04-09 LAB
CHOLEST SERPL-MCNC: 149 MG/DL
HDLC SERPL-MCNC: 59 MG/DL
LDLC SERPL CALC-MCNC: 71 MG/DL
NONHDLC SERPL-MCNC: 90 MG/DL
POTASSIUM SERPL-SCNC: 4.5 MMOL/L (ref 3.4–5.3)
TRIGL SERPL-MCNC: 95 MG/DL

## 2017-04-09 PROCEDURE — 25000132 ZZH RX MED GY IP 250 OP 250 PS 637: Performed by: INTERNAL MEDICINE

## 2017-04-09 PROCEDURE — 12000007 ZZH R&B INTERMEDIATE

## 2017-04-09 PROCEDURE — 36415 COLL VENOUS BLD VENIPUNCTURE: CPT | Performed by: INTERNAL MEDICINE

## 2017-04-09 PROCEDURE — 84132 ASSAY OF SERUM POTASSIUM: CPT | Performed by: INTERNAL MEDICINE

## 2017-04-09 PROCEDURE — 80061 LIPID PANEL: CPT | Performed by: INTERNAL MEDICINE

## 2017-04-09 PROCEDURE — 25000128 H RX IP 250 OP 636: Performed by: INTERNAL MEDICINE

## 2017-04-09 PROCEDURE — 99232 SBSQ HOSP IP/OBS MODERATE 35: CPT | Performed by: INTERNAL MEDICINE

## 2017-04-09 RX ADMIN — HYDROMORPHONE HYDROCHLORIDE 0.2 MG: 10 INJECTION, SOLUTION INTRAMUSCULAR; INTRAVENOUS; SUBCUTANEOUS at 02:33

## 2017-04-09 RX ADMIN — CETIRIZINE HYDROCHLORIDE 10 MG: 10 TABLET, FILM COATED ORAL at 08:03

## 2017-04-09 RX ADMIN — ACETAMINOPHEN 650 MG: 325 TABLET, FILM COATED ORAL at 03:43

## 2017-04-09 RX ADMIN — ZOLPIDEM TARTRATE 5 MG: 5 TABLET, FILM COATED ORAL at 02:33

## 2017-04-09 RX ADMIN — ASPIRIN 81 MG: 81 TABLET, COATED ORAL at 08:03

## 2017-04-09 RX ADMIN — RANITIDINE HYDROCHLORIDE 150 MG: 150 TABLET, FILM COATED ORAL at 20:35

## 2017-04-09 RX ADMIN — Medication 1 G: at 08:03

## 2017-04-09 RX ADMIN — POTASSIUM CHLORIDE 40 MEQ: 1500 TABLET, EXTENDED RELEASE ORAL at 08:03

## 2017-04-09 RX ADMIN — METOPROLOL TARTRATE 12.5 MG: 25 TABLET, FILM COATED ORAL at 20:35

## 2017-04-09 RX ADMIN — INDOMETHACIN 25 MG: 25 CAPSULE ORAL at 17:39

## 2017-04-09 RX ADMIN — ACETAMINOPHEN 650 MG: 325 TABLET, FILM COATED ORAL at 15:40

## 2017-04-09 RX ADMIN — METOPROLOL TARTRATE 12.5 MG: 25 TABLET, FILM COATED ORAL at 08:03

## 2017-04-09 RX ADMIN — RANITIDINE HYDROCHLORIDE 150 MG: 150 TABLET, FILM COATED ORAL at 08:03

## 2017-04-09 RX ADMIN — INDOMETHACIN 25 MG: 25 CAPSULE ORAL at 12:42

## 2017-04-09 RX ADMIN — ACETAMINOPHEN 650 MG: 325 TABLET, FILM COATED ORAL at 23:02

## 2017-04-09 RX ADMIN — ATORVASTATIN CALCIUM 40 MG: 40 TABLET, FILM COATED ORAL at 08:03

## 2017-04-09 RX ADMIN — POTASSIUM CHLORIDE 20 MEQ: 1500 TABLET, EXTENDED RELEASE ORAL at 10:41

## 2017-04-09 RX ADMIN — INDOMETHACIN 25 MG: 25 CAPSULE ORAL at 08:03

## 2017-04-09 NOTE — PLAN OF CARE
Problem: Goal Outcome Summary  Goal: Goal Outcome Summary  A/O x4. Cambodian speaking. Independent in room. LS: dim, reporting SOB. Tele: SR. Reporting 7/10 chest pain- IV dilaudid given at 0230 and tylenol given at 0345 resulting in decrease in pain. 2g Na diet. Ambien given for sleep. Plan for Lexiscan on Monday

## 2017-04-09 NOTE — PROGRESS NOTES
[facility]  Hospitalist Progress Note  Abdullahi Osorio MD 04/09/2017    Reason for Stay (Diagnosis): Chest pain         Assessment and Plan:      Summary of Stay: Won Huggins is a 76 year old female with h/o HTN, CVA, colon and lung cancer admitted on 4/7/2017 with Chest pain and elevated troponin.    Problem List:   1. Chest pain and elevated trop- Trop trending down, could be pericarditis,. Pain better after NSAIDs started. TTE normal EF 65%, no RWMA. Cardiology consulted, plan for Nuc stress test in AM. On ASA, Lipitor, BB. She got a dose of Lovenox on admission 1.5mg/kg once. It was not continued.  2. Hypokalemia- Replaced. I do not see follow up K level check, discussed with nurse, will be checked this afternoon.    3. HTN- Controlled.  DVT Prophylaxis: Pneumatic Compression Devices  Code Status: Full Code  Discharge Dispo: Home  Estimated Disch Date / # of Days until Disch: 1-2 days depending on the stress result.        Interval History (Subjective):      Patient seen and examined, assumed care today. No chest pain. Used her daughter as an . I discussed with patient and daughter at bedside at length.                  Physical Exam:      Last Vital Signs:  /61  Pulse 75  Temp 96  F (35.6  C) (Oral)  Resp 18  Ht 1.524 m (5')  Wt 55.4 kg (122 lb 3.2 oz)  SpO2 94%  BMI 23.87 kg/m2    I/O last 3 completed shifts:  In: 240 [P.O.:240]  Out: -   Wt Readings from Last 1 Encounters:   04/07/17 55.4 kg (122 lb 3.2 oz)       Constitutional: Awake, alert, cooperative, no apparent distress   Respiratory: Clear to auscultation bilaterally, no crackles or wheezing   Cardiovascular: Regular rate and rhythm, normal S1 and S2, and no murmur noted   Abdomen: Normal bowel sounds, soft, non-distended, non-tender   Skin: No rashes, no cyanosis, dry to touch   Neuro: Alert and oriented x3, no weakness, numbness, memory loss   Extremities: No edema, normal range of motion   Other(s):        All  other systems: Negative          Medications:      All current medications were reviewed with changes reflected in problem list.         Data:      All new lab and imaging data was reviewed.   Labs:    Recent Labs  Lab 04/07/17  1900      POTASSIUM 3.1*   CHLORIDE 103   CO2 27   ANIONGAP 9   *   BUN 16   CR 0.66   GFRESTIMATED 88   GFRESTBLACK >90African American GFR Calc   STEPHEN 8.4*       Recent Labs  Lab 04/07/17  1900   WBC 8.4   HGB 11.9   HCT 36.0   MCV 90          Recent Labs  Lab 04/07/17  1900   *       Recent Labs  Lab 04/09/17  0725   CHOL 149   HDL 59   LDL 71   TRIG 95       Recent Labs  Lab 04/08/17  0610 04/08/17  0205 04/07/17  1900   TROPI 0.098* 0.103* 0.095*     No results for input(s): TSH in the last 168 hours.   Imaging:   Recent Results (from the past 48 hour(s))   CT Chest/Abdomen/Pelvis w Contrast    Narrative    CT CHEST/ABDOMEN/PELVIS WITH CONTRAST 4/7/2017 9:18 PM     HISTORY: Chest pain, shortness of breath, abdominal pain (left upper  quadrant), evaluate for pulmonary embolism.    CONTRAST DOSE:  56mL Isovue -370    Radiation dose for this scan was reduced using automated exposure  control, adjustment of the mA and/or kV according to patient size, or  iterative reconstruction technique.    FINDINGS:    Chest: There is a good contrast bolus within the pulmonary arteries.  There are no pulmonary arterial filling defects to indicate pulmonary  embolism. Contrast-enhancement within the aorta is less optimal. There  is no evidence of aortic dissection. Scattered aortic and coronary  artery calcifications are noted. The mediastinum and rosa are  otherwise unremarkable. Fibrotic changes are noted in the lower lobes.  The lungs are otherwise grossly clear. No pleural effusion or  pneumothorax.    Abdomen/pelvis: The liver, spleen, kidneys, adrenal glands, pancreas,  and gallbladder appear within normal limits. Left lower quadrant  ostomy is noted. There is no evidence  of bowel obstruction. No free  peritoneal fluid or air. Pelvic contents appear within normal limits.  Vertebral body compressions are noted at L1 and L2, chronic in  appearance.      Impression    IMPRESSION:  1. No CT evidence of pulmonary embolism.  2. Coronary artery and aortic calcifications are noted.  3. Bilateral lower lobe fibrotic changes. The lungs are otherwise  grossly clear.  4. No CT evidence of an acute inflammatory process in the abdomen or  pelvis. No evidence of bowel obstruction.    CHRISTY AGUILAR MD

## 2017-04-09 NOTE — PLAN OF CARE
Problem: Individualization  Goal: Patient Preferences  Outcome: No Change  A/O family at bedside assisting with cares.  C/O CP, Dilaudid given.  Tele SR  LS clear. 95% RA.   Lexiscan on Monday.

## 2017-04-09 NOTE — PLAN OF CARE
Problem: Goal Outcome Summary  Goal: Goal Outcome Summary  Outcome: No Change  Pain: Chest pain better today.  Denies pain.  Indocin scheduled.  LOC: alert and oriented. Comoran-speaking  Mobility: SBA to bathroom. Ambulate in hallway. Family at bedside.  Lungs: MACKAY, diminished.  94% RA  Tele: SR  GI: Low fat diet. <2400 mg NA. ostomy with 2-piece appliance left upper abd.  : voiding without difficulty  IV: PIV saline locked  Other:  phone at bedside. Lexiscan ordered for Monday. Echo done today. Cardiology following. Potassium replaced today (and yesterday early morning)  Re-check this afternoon 14:45.

## 2017-04-10 ENCOUNTER — APPOINTMENT (OUTPATIENT)
Dept: NUCLEAR MEDICINE | Facility: CLINIC | Age: 77
DRG: 282 | End: 2017-04-10
Attending: INTERNAL MEDICINE
Payer: COMMERCIAL

## 2017-04-10 VITALS
DIASTOLIC BLOOD PRESSURE: 78 MMHG | BODY MASS INDEX: 23.99 KG/M2 | HEART RATE: 64 BPM | RESPIRATION RATE: 18 BRPM | OXYGEN SATURATION: 97 % | SYSTOLIC BLOOD PRESSURE: 159 MMHG | WEIGHT: 122.2 LBS | TEMPERATURE: 95.7 F | HEIGHT: 60 IN

## 2017-04-10 LAB
CRP SERPL-MCNC: 127 MG/L (ref 0–8)
MAGNESIUM SERPL-MCNC: 1.9 MG/DL (ref 1.6–2.3)
POTASSIUM SERPL-SCNC: 4.3 MMOL/L (ref 3.4–5.3)

## 2017-04-10 PROCEDURE — 93017 CV STRESS TEST TRACING ONLY: CPT

## 2017-04-10 PROCEDURE — 84132 ASSAY OF SERUM POTASSIUM: CPT | Performed by: INTERNAL MEDICINE

## 2017-04-10 PROCEDURE — 25000132 ZZH RX MED GY IP 250 OP 250 PS 637: Performed by: INTERNAL MEDICINE

## 2017-04-10 PROCEDURE — A9502 TC99M TETROFOSMIN: HCPCS | Performed by: INTERNAL MEDICINE

## 2017-04-10 PROCEDURE — 86140 C-REACTIVE PROTEIN: CPT | Performed by: INTERNAL MEDICINE

## 2017-04-10 PROCEDURE — 99239 HOSP IP/OBS DSCHRG MGMT >30: CPT | Performed by: INTERNAL MEDICINE

## 2017-04-10 PROCEDURE — 78452 HT MUSCLE IMAGE SPECT MULT: CPT

## 2017-04-10 PROCEDURE — 25000128 H RX IP 250 OP 636: Performed by: INTERNAL MEDICINE

## 2017-04-10 PROCEDURE — 99231 SBSQ HOSP IP/OBS SF/LOW 25: CPT | Performed by: INTERNAL MEDICINE

## 2017-04-10 PROCEDURE — 83735 ASSAY OF MAGNESIUM: CPT | Performed by: INTERNAL MEDICINE

## 2017-04-10 PROCEDURE — 34300033 ZZH RX 343: Performed by: INTERNAL MEDICINE

## 2017-04-10 PROCEDURE — 93018 CV STRESS TEST I&R ONLY: CPT | Performed by: INTERNAL MEDICINE

## 2017-04-10 PROCEDURE — 78452 HT MUSCLE IMAGE SPECT MULT: CPT | Mod: 26 | Performed by: INTERNAL MEDICINE

## 2017-04-10 PROCEDURE — 36415 COLL VENOUS BLD VENIPUNCTURE: CPT | Performed by: INTERNAL MEDICINE

## 2017-04-10 RX ORDER — METOPROLOL TARTRATE 25 MG/1
25 TABLET, FILM COATED ORAL 2 TIMES DAILY
Qty: 60 TABLET | Refills: 0 | Status: ON HOLD | OUTPATIENT
Start: 2017-04-10 | End: 2017-11-02

## 2017-04-10 RX ORDER — INDOMETHACIN 25 MG/1
25 CAPSULE ORAL
Qty: 15 CAPSULE | Refills: 0 | Status: SHIPPED | OUTPATIENT
Start: 2017-04-10 | End: 2017-05-23

## 2017-04-10 RX ORDER — REGADENOSON 0.08 MG/ML
0.4 INJECTION, SOLUTION INTRAVENOUS ONCE
Status: DISCONTINUED | OUTPATIENT
Start: 2017-04-10 | End: 2017-04-10 | Stop reason: CLARIF

## 2017-04-10 RX ORDER — REGADENOSON 0.08 MG/ML
INJECTION, SOLUTION INTRAVENOUS
Status: DISCONTINUED
Start: 2017-04-10 | End: 2017-04-10 | Stop reason: HOSPADM

## 2017-04-10 RX ADMIN — TETROFOSMIN 11 MCI.: 0.23 INJECTION, POWDER, LYOPHILIZED, FOR SOLUTION INTRAVENOUS at 08:01

## 2017-04-10 RX ADMIN — ACETAMINOPHEN 650 MG: 325 TABLET, FILM COATED ORAL at 12:00

## 2017-04-10 RX ADMIN — ATORVASTATIN CALCIUM 40 MG: 40 TABLET, FILM COATED ORAL at 12:01

## 2017-04-10 RX ADMIN — Medication 1 G: at 12:01

## 2017-04-10 RX ADMIN — METOPROLOL TARTRATE 12.5 MG: 25 TABLET, FILM COATED ORAL at 12:03

## 2017-04-10 RX ADMIN — TETROFOSMIN 33 MCI.: 0.23 INJECTION, POWDER, LYOPHILIZED, FOR SOLUTION INTRAVENOUS at 09:26

## 2017-04-10 RX ADMIN — INDOMETHACIN 25 MG: 25 CAPSULE ORAL at 12:02

## 2017-04-10 RX ADMIN — RANITIDINE HYDROCHLORIDE 150 MG: 150 TABLET, FILM COATED ORAL at 12:01

## 2017-04-10 RX ADMIN — CETIRIZINE HYDROCHLORIDE 10 MG: 10 TABLET, FILM COATED ORAL at 12:01

## 2017-04-10 RX ADMIN — ASPIRIN 81 MG: 81 TABLET, COATED ORAL at 12:02

## 2017-04-10 RX ADMIN — REGADENOSON 0.4 MG: 0.08 INJECTION, SOLUTION INTRAVENOUS at 09:20

## 2017-04-10 NOTE — PROGRESS NOTES
Cardiology Progress Note  BERTA Malcolm          Assessment and Plan:   Admit (4/7) w/ atypical CP  Recently tx for bronchitis  Evidence of NSTEMI  PMH:  HTN/CVA/lung CA/colon Ca.    NSTEMI:  -troponin peak 0.1 (flat)/no significant ST abnormalities/ LVEF 65% w/o RWMA  -atypical CP qualities (worse w/ deep breath)  -indomethacin has been started due to concern for pericarditis  -no further CP  -NUC planned today  -bblocker/ASA/statin have been started    HTN:  -BP borderline this am  -consider increasing Metoprolol (somewhat fan at times) could consider low dose ACE-I    Baseline LDL 71  -statin has been started  -will need f/u lipid panel in 2 months               Interval History:                   Medications:       regadenoson         metoprolol  12.5 mg Oral BID     atorvastatin  40 mg Oral Daily     fish oil-omega-3 fatty acids  1 g Oral Daily     ranitidine  150 mg Oral BID     indomethacin  25 mg Oral TID w/meals     aspirin  81 mg Oral Daily     cetirizine  10 mg Oral Daily            Physical Exam:   Blood pressure 146/68, pulse 64, temperature 95.7  F (35.4  C), temperature source Oral, resp. rate 18, height 1.524 m (5'), weight 55.4 kg (122 lb 3.2 oz), SpO2 97 %.  Wt Readings from Last 3 Encounters:   04/07/17 55.4 kg (122 lb 3.2 oz)   09/03/15 55.3 kg (122 lb)     I/O last 3 completed shifts:  In: 480 [P.O.:480]  Out: -     CONST:    LUNGS:    CARDIO:    ABD:    EXT:             Data:   TELE:  SR    CBC    Recent Labs  Lab 04/07/17  1900   WBC 8.4   HGB 11.9          BMP    Recent Labs  Lab 04/10/17  0707 04/09/17  1412 04/07/17  1900   NA  --   --  139   POTASSIUM 4.3 4.5 3.1*   CHLORIDE  --   --  103   STEPHEN  --   --  8.4*   CO2  --   --  27   BUN  --   --  16   CR  --   --  0.66   GLC  --   --  226*     Recent Labs   Lab Test  04/09/17   0725   CHOL  149   HDL  59   LDL  71   TRIG  95       TROP  Lab Results   Component Value Date    TROPI 0.098 (H) 04/08/2017    TROPI 0.103 (H) 04/08/2017     TROPI 0.095 (H) 04/07/2017       BNP    Recent Labs  Lab 04/07/17  1900   NTBNPI 307

## 2017-04-10 NOTE — PLAN OF CARE
Problem: Goal Outcome Summary  Goal: Goal Outcome Summary  A/O x4. Independent in room with family. Lung sounds dim. Tele: SB, denies CP and SOB. 2g Na diet. Lexiscan today. Denies pain.

## 2017-04-10 NOTE — DISCHARGE SUMMARY
DATE OF ADMISSION: 4/7/2017.        DATE Of DISCHARGE 4/10/2017.       PRIMARY CARE PHYSICIAN:  [facility].      DISCHARGE DIAGNOSES:   1.  Atypical chest pain, suspected pericarditis.   2.  Elevated troponin, possible non-ST elevation MI (myocardial infarction).     3.  Hypokalemia.   4.  Hypertension.      DISPOSITION:  Home.      DISCHARGE MEDICATIONS:   1.  Indomethacin 25 mg p.o. 3 times a day for 5 days.   2.  Zantac 150 mg p.o. twice a day for 15 days.   3.  Metoprolol 25 mg p.o. twice a day.   4.  Acetaminophen 325/600 mg every 4 hours as needed.   5.  ProAir HFA 1 to 2 puffs inhalation every 4 hours as needed.   6.  Aspirin 81 mg p.o. daily.   7.  Zyrtec 10 mg p.o. daily.     8.  Flonase 2 sprays into both nostrils daily.   9.  Fish oil 1 gram p.o. daily.   10.  Ambien 5 mg p.o. as needed.   11.  Guaifenesin 1 to 2 teaspoon full every 8 hours as needed.   12.  Norco 1 tablet p.o. daily.   13.  Tarceva 100 mg by mouth every other day.      DISCHARGE CONDITION:  Stable.      DISCHARGE VITAL SIGNS:  Blood pressure 150/70, pulse rate 64, temperature 95.7, oxygen saturation 97%.      DISCHARGE PHYSICAL EXAMINATION:   GENERAL:  Awake, alert, oriented, comfortable not in any from of distress.   HEENT:  Pink nonicteric, extraocular muscle movement intact.   NECK:  Supple, no JVD, no thyromegaly.   CHEST:  Good air entry, no friction rub.   CARDIOVASCULAR:  S1, S2 were heard, no murmur.   ABDOMEN:  Soft, nontender, nondistended, positive bowel sounds.   EXTREMITIES:  No edema, cyanosis or clubbing.      PROCEDURES DONE:  Nuclear stress test which turned out to be negative.      CONSULTATIONS:  Cardiology.        DISCHARGE LABS:  Include potassium 4.3, magnesium 1.9.  .  Glucose on presentation was 200 elevated.  CBC normal.  Urinalysis negative.  Echo showed normal left ventricular function and ejection fraction of 65%.      DISCHARGE DIET:  Low-salt diet.      DISCHARGE ACTIVITY:  As tolerated.       DISCHARGE FOLLOWUP:  With primary care provider in 1 week.      BRIEF HISTORY AND HOSPITAL COURSE:  Merrill Huggins is a 76-year-old female with a past medical history of hypertension, CVA, colon and lung cancer admitted on 2017 with chest pain and elevated troponin.  The chest pain was atypical in nature worse with deep breathing but associated with it there was elevated troponin and she was treated as a case of acute coronary syndrome, her troponin peaked at 0.1 and trended down, pericarditis was suspected.  The patient was started on indomethacin which significantly resolved her pain since.  Echo was normal, a stress test was normal.  I discussed with cardiologist, Dr. Coello regarding this patient today and the plan of discharge; he agreed to discharge her on indomethacin for 5 to 7 days and also to monitor her and have followup with her primary care provider.  He also agreed to stop her Lipitor as her lipid panel is within normal range and unlikely for this to be MI.  All her questions and concerns were addressed, the patient is a Kazakh speaker, I discussed with the patient by using her daughter at bedside that helped in interpreting as there was no official  present.      Total time spent coordinating his discharge face-to-face with the patient was over of 35 minutes.         GAYB MCLEOD MD             D: 04/10/2017 16:19   T: 04/10/2017 17:08   MT: GARRET#[id number]      Name:     MERRILL HUGGINS   MRN:      [id number]        Account:        [id number]   :      1940           Admit Date:                                       Discharge Date:       Document: [id number]       cc: [facility]

## 2017-04-10 NOTE — PROGRESS NOTES
17:10  Pt d/c home at this time accompanied by family.Pt and family verbalize understanding of d/c instructions and medications.  Pt acknowledges receipt of all belongings.   present at discharge.

## 2017-04-10 NOTE — PROGRESS NOTES
Pre-procedure:    Initial vital signs: /69, HR 82, RR 18  Allergies: reviewed   Rhythm: Sinus  Medications taken within 48 hours of procedure: na     Last Caffeine: nighttime  Lung sounds: CTA, no wheezing, crackles or rtx  Health History (COPD, Asthma, etc): none    Procedure: Lexiscan  Reaction/symptoms after receiving Viv injection: Shortness of breath    Vital Signs:/74, HR 78, RR 22    Reversal agent: N/A    Post:   Resolution of symptoms?: YES  Vital signs: /72, HR 75, RR 20  Walk: NO  Return to Radiology

## 2017-04-10 NOTE — PLAN OF CARE
Problem: Goal Outcome Summary  Goal: Goal Outcome Summary  Outcome: No Change  Patient alert, steady with ind ambulation. Daughter at bedside. Lexiscan completed, await results. VSS, tele SR. POC reviewed with pt/daughter.    Seen by Dr Coello, review Viv with pt/dtr. Ok to discharge per Dr Coello, paged Dr Howard to update.

## 2017-04-10 NOTE — PLAN OF CARE
Problem: Individualization  Goal: Patient Preferences  Outcome: No Change  A/O SBA to transfer, steady gait.  Tele SR. VSS afebrile. Lexiscan on Monday. Family at bedside, updated.

## 2017-04-12 LAB — INTERPRETATION ECG - MUSE: NORMAL

## 2017-05-23 ENCOUNTER — APPOINTMENT (OUTPATIENT)
Dept: ULTRASOUND IMAGING | Facility: CLINIC | Age: 77
End: 2017-05-23
Attending: EMERGENCY MEDICINE
Payer: COMMERCIAL

## 2017-05-23 ENCOUNTER — APPOINTMENT (OUTPATIENT)
Dept: GENERAL RADIOLOGY | Facility: CLINIC | Age: 77
End: 2017-05-23
Attending: EMERGENCY MEDICINE
Payer: COMMERCIAL

## 2017-05-23 ENCOUNTER — HOSPITAL ENCOUNTER (OUTPATIENT)
Facility: CLINIC | Age: 77
Setting detail: OBSERVATION
Discharge: HOME | End: 2017-05-24
Attending: EMERGENCY MEDICINE | Admitting: HOSPITALIST
Payer: COMMERCIAL

## 2017-05-23 ENCOUNTER — APPOINTMENT (OUTPATIENT)
Dept: CT IMAGING | Facility: CLINIC | Age: 77
End: 2017-05-23
Attending: EMERGENCY MEDICINE
Payer: COMMERCIAL

## 2017-05-23 DIAGNOSIS — S82.202A: Primary | ICD-10-CM

## 2017-05-23 DIAGNOSIS — S82.90XA: ICD-10-CM

## 2017-05-23 PROBLEM — S82.209A TIBIAL FRACTURE: Status: ACTIVE | Noted: 2017-05-23

## 2017-05-23 LAB
ANION GAP SERPL CALCULATED.3IONS-SCNC: 6 MMOL/L (ref 3–14)
BASOPHILS # BLD AUTO: 0 10E9/L (ref 0–0.2)
BASOPHILS NFR BLD AUTO: 0.1 %
BUN SERPL-MCNC: 15 MG/DL (ref 7–30)
CALCIUM SERPL-MCNC: 9.1 MG/DL (ref 8.5–10.1)
CHLORIDE SERPL-SCNC: 100 MMOL/L (ref 94–109)
CO2 SERPL-SCNC: 31 MMOL/L (ref 20–32)
CREAT SERPL-MCNC: 0.55 MG/DL (ref 0.52–1.04)
DIFFERENTIAL METHOD BLD: NORMAL
EOSINOPHIL # BLD AUTO: 0.1 10E9/L (ref 0–0.7)
EOSINOPHIL NFR BLD AUTO: 1.4 %
ERYTHROCYTE [DISTWIDTH] IN BLOOD BY AUTOMATED COUNT: 13.4 % (ref 10–15)
GFR SERPL CREATININE-BSD FRML MDRD: ABNORMAL ML/MIN/1.7M2
GLUCOSE SERPL-MCNC: 125 MG/DL (ref 70–99)
HCT VFR BLD AUTO: 36.9 % (ref 35–47)
HGB BLD-MCNC: 12.3 G/DL (ref 11.7–15.7)
IMM GRANULOCYTES # BLD: 0 10E9/L (ref 0–0.4)
IMM GRANULOCYTES NFR BLD: 0.2 %
LYMPHOCYTES # BLD AUTO: 1.6 10E9/L (ref 0.8–5.3)
LYMPHOCYTES NFR BLD AUTO: 17.4 %
MCH RBC QN AUTO: 29.9 PG (ref 26.5–33)
MCHC RBC AUTO-ENTMCNC: 33.3 G/DL (ref 31.5–36.5)
MCV RBC AUTO: 90 FL (ref 78–100)
MONOCYTES # BLD AUTO: 0.7 10E9/L (ref 0–1.3)
MONOCYTES NFR BLD AUTO: 7.6 %
NEUTROPHILS # BLD AUTO: 6.9 10E9/L (ref 1.6–8.3)
NEUTROPHILS NFR BLD AUTO: 73.3 %
NRBC # BLD AUTO: 0 10*3/UL
NRBC BLD AUTO-RTO: 0 /100
PLATELET # BLD AUTO: 175 10E9/L (ref 150–450)
POTASSIUM SERPL-SCNC: 3.4 MMOL/L (ref 3.4–5.3)
RBC # BLD AUTO: 4.11 10E12/L (ref 3.8–5.2)
SODIUM SERPL-SCNC: 137 MMOL/L (ref 133–144)
WBC # BLD AUTO: 9.4 10E9/L (ref 4–11)

## 2017-05-23 PROCEDURE — 25000132 ZZH RX MED GY IP 250 OP 250 PS 637: Performed by: EMERGENCY MEDICINE

## 2017-05-23 PROCEDURE — 85025 COMPLETE CBC W/AUTO DIFF WBC: CPT | Performed by: EMERGENCY MEDICINE

## 2017-05-23 PROCEDURE — G0378 HOSPITAL OBSERVATION PER HR: HCPCS

## 2017-05-23 PROCEDURE — 73552 X-RAY EXAM OF FEMUR 2/>: CPT | Mod: LT

## 2017-05-23 PROCEDURE — 93970 EXTREMITY STUDY: CPT

## 2017-05-23 PROCEDURE — 73610 X-RAY EXAM OF ANKLE: CPT | Mod: LT

## 2017-05-23 PROCEDURE — 73700 CT LOWER EXTREMITY W/O DYE: CPT | Mod: LT

## 2017-05-23 PROCEDURE — 73560 X-RAY EXAM OF KNEE 1 OR 2: CPT | Mod: LT

## 2017-05-23 PROCEDURE — 80048 BASIC METABOLIC PNL TOTAL CA: CPT | Performed by: EMERGENCY MEDICINE

## 2017-05-23 PROCEDURE — 99220 ZZC INITIAL OBSERVATION CARE,LEVL III: CPT | Performed by: HOSPITALIST

## 2017-05-23 PROCEDURE — 25000132 ZZH RX MED GY IP 250 OP 250 PS 637: Performed by: HOSPITALIST

## 2017-05-23 PROCEDURE — 99285 EMERGENCY DEPT VISIT HI MDM: CPT | Mod: 25

## 2017-05-23 PROCEDURE — 73590 X-RAY EXAM OF LOWER LEG: CPT | Mod: LT

## 2017-05-23 RX ORDER — IBUPROFEN 400 MG/1
400 TABLET, FILM COATED ORAL EVERY 6 HOURS PRN
Status: DISCONTINUED | OUTPATIENT
Start: 2017-05-23 | End: 2017-05-24 | Stop reason: HOSPADM

## 2017-05-23 RX ORDER — HYDROCODONE BITARTRATE AND ACETAMINOPHEN 5; 325 MG/1; MG/1
1-2 TABLET ORAL EVERY 4 HOURS PRN
Status: DISCONTINUED | OUTPATIENT
Start: 2017-05-23 | End: 2017-05-24 | Stop reason: HOSPADM

## 2017-05-23 RX ORDER — HYDROXYZINE HYDROCHLORIDE 25 MG/1
50 TABLET, FILM COATED ORAL EVERY 6 HOURS PRN
Status: DISCONTINUED | OUTPATIENT
Start: 2017-05-23 | End: 2017-05-24 | Stop reason: HOSPADM

## 2017-05-23 RX ORDER — ASPIRIN 81 MG/1
81 TABLET ORAL DAILY
Status: DISCONTINUED | OUTPATIENT
Start: 2017-05-23 | End: 2017-05-24 | Stop reason: HOSPADM

## 2017-05-23 RX ORDER — ONDANSETRON 4 MG/1
4 TABLET, ORALLY DISINTEGRATING ORAL EVERY 6 HOURS PRN
Status: DISCONTINUED | OUTPATIENT
Start: 2017-05-23 | End: 2017-05-24 | Stop reason: HOSPADM

## 2017-05-23 RX ORDER — CETIRIZINE HYDROCHLORIDE 10 MG/1
10 TABLET ORAL DAILY
Status: DISCONTINUED | OUTPATIENT
Start: 2017-05-23 | End: 2017-05-24 | Stop reason: HOSPADM

## 2017-05-23 RX ORDER — HYDROXYZINE HYDROCHLORIDE 25 MG/1
25 TABLET, FILM COATED ORAL EVERY 6 HOURS PRN
Status: DISCONTINUED | OUTPATIENT
Start: 2017-05-23 | End: 2017-05-24 | Stop reason: HOSPADM

## 2017-05-23 RX ORDER — NALOXONE HYDROCHLORIDE 0.4 MG/ML
.1-.4 INJECTION, SOLUTION INTRAMUSCULAR; INTRAVENOUS; SUBCUTANEOUS
Status: DISCONTINUED | OUTPATIENT
Start: 2017-05-23 | End: 2017-05-24 | Stop reason: HOSPADM

## 2017-05-23 RX ORDER — ACETAMINOPHEN 325 MG/1
325-650 TABLET ORAL EVERY 4 HOURS PRN
Status: DISCONTINUED | OUTPATIENT
Start: 2017-05-23 | End: 2017-05-24 | Stop reason: HOSPADM

## 2017-05-23 RX ORDER — OXYCODONE AND ACETAMINOPHEN 5; 325 MG/1; MG/1
1 TABLET ORAL ONCE
Status: COMPLETED | OUTPATIENT
Start: 2017-05-23 | End: 2017-05-23

## 2017-05-23 RX ORDER — METOPROLOL TARTRATE 25 MG/1
25 TABLET, FILM COATED ORAL 2 TIMES DAILY
Status: DISCONTINUED | OUTPATIENT
Start: 2017-05-23 | End: 2017-05-24 | Stop reason: HOSPADM

## 2017-05-23 RX ORDER — MORPHINE SULFATE 2 MG/ML
2-4 INJECTION, SOLUTION INTRAMUSCULAR; INTRAVENOUS
Status: DISCONTINUED | OUTPATIENT
Start: 2017-05-23 | End: 2017-05-24 | Stop reason: HOSPADM

## 2017-05-23 RX ORDER — ZOLPIDEM TARTRATE 5 MG/1
5 TABLET ORAL
Status: DISCONTINUED | OUTPATIENT
Start: 2017-05-23 | End: 2017-05-24 | Stop reason: HOSPADM

## 2017-05-23 RX ORDER — ONDANSETRON 2 MG/ML
4 INJECTION INTRAMUSCULAR; INTRAVENOUS EVERY 6 HOURS PRN
Status: DISCONTINUED | OUTPATIENT
Start: 2017-05-23 | End: 2017-05-24 | Stop reason: HOSPADM

## 2017-05-23 RX ADMIN — HYDROXYZINE HYDROCHLORIDE 25 MG: 25 TABLET ORAL at 16:10

## 2017-05-23 RX ADMIN — METOPROLOL TARTRATE 25 MG: 25 TABLET ORAL at 21:27

## 2017-05-23 RX ADMIN — RANITIDINE HYDROCHLORIDE 150 MG: 150 TABLET, FILM COATED ORAL at 21:28

## 2017-05-23 RX ADMIN — HYDROCODONE BITARTRATE AND ACETAMINOPHEN 2 TABLET: 5; 325 TABLET ORAL at 19:59

## 2017-05-23 RX ADMIN — CETIRIZINE HYDROCHLORIDE 10 MG: 10 TABLET, FILM COATED ORAL at 14:12

## 2017-05-23 RX ADMIN — HYDROCODONE BITARTRATE AND ACETAMINOPHEN 2 TABLET: 5; 325 TABLET ORAL at 14:11

## 2017-05-23 RX ADMIN — IBUPROFEN 400 MG: 400 TABLET ORAL at 14:12

## 2017-05-23 RX ADMIN — OXYCODONE HYDROCHLORIDE AND ACETAMINOPHEN 1 TABLET: 5; 325 TABLET ORAL at 07:53

## 2017-05-23 RX ADMIN — ASPIRIN 81 MG: 81 TABLET, COATED ORAL at 14:12

## 2017-05-23 NOTE — CONSULTS
Non-displaced PCL avulsion fracture. Treatment is non-op and symptomatic only. May wt. bear and bend unrestricted. Follow up to my office 7-10 days.

## 2017-05-23 NOTE — PROGRESS NOTES
ROOM # 213-1    Living Situation (if not independent, order SW consult):Home with daughter  Facility name: Home  : Agueda [phone] and Kayleen (daughter) in chart    Activity level at baseline: Ind with walker with help as needed   Activity level on admit: Assist of 2 and walker      Patient registered to observation; given Patient Bill of Rights; given the opportunity to ask questions about observation status and their plan of care.  Patient has been oriented to the observation room, bathroom and call light is in place.    Discussed discharge goals and expectations with patient/family.

## 2017-05-23 NOTE — ED NOTES
Bed: ED08  Expected date: 5/23/17  Expected time: 7:23 AM  Means of arrival: Ambulance  Comments:  Mi 594  75yo fall/knee pain

## 2017-05-23 NOTE — ED PROVIDER NOTES
History     Chief Complaint:  Knee pain    A  was used (daughter volunteers to interpret ).      Won Huggins is a 76 year old female with a history of hypertension and a previous CVA who presents to the emergency department today for evaluation of knee pain. The patient fell at [facility] yesterday at 14:00 and landed on her knee. No loss of consciousness or head injury.The patient needed assistance to ambulated. Since, the patient has complained of left knee pain with difficulty ambulating secondary to pain. Due to worsening symptoms, the daughter called EMS. EMS gave the patient 25 mcg of Fentanyl, which the patient noted improved her symptoms.     Allergies:  NKDA     Medications:    Indomethacin (indocin) 25 mg capsule  Ranitidine (zantac) 150 mg tablet  Metoprolol (lopressor) 25 mg tablet  Acetaminophen (tylenol po)  Albuterol (proair hfa/proventil hfa/ventolin hfa) 108 (90 base) mcg/act inhaler  Aspirin 81 mg ec tablet  Cetirizine (zyrtec) 10 mg tablet  Erlotinib (tarceva) 100 mg tabs  Fluticasone (flonase) 50 mcg/act spray  Fish oil-omega-3 fatty acids 1000 mg capsule  Zolpidem (ambien) 5 mg tablet  Guaifenesin-codeine (robitussin ac) 100-10 mg/5ml soln solution  Hydrocodone-acetaminophen (norco) 5-325 mg per tablet    Past Medical History:    Cancer  CVA  Hypertension  Chest pain    Past Surgical History:    Biopsy  Lobectomy    Family History:    History reviewed. No pertinent family history.    Social History:  Marital Status:  Single [1]  Tobacco: Negative  Alcohol: Negative  Presents with mario  PCP  [facility], [place]    Review of Systems   Musculoskeletal:        Positive for left knee pain.    All other systems reviewed and are negative.    Physical Exam   First Vitals:  Patient Vitals for the past 24 hrs:   BP Temp Temp src Pulse Resp SpO2   05/23/17 0800 - - - - - 100 %   05/23/17 0738 174/75 98.7  F (37.1  C) Oral 72 18 100 %     Physical Exam  Constitutional:  Oriented to  person, place, and time. Minor distress secondary due to pain.  HENT:   Head:    Normocephalic. Atraumatic.  Mouth/Throat:   Oropharynx is clear and moist.   Eyes:    EOM are normal. Pupils are equal, round, and reactive to light.   Neck:    Neck supple.   Cardiovascular:  Normal rate, regular rhythm and normal heart sounds.      Exam reveals no gallop and no friction rub.       No murmur heard.     2+ distal pulses.   Pulmonary/Chest:  Effort normal and breath sounds normal.      No respiratory distress. No wheezes. No rales.      No reproducible chest wall pain.  Abdominal:   Soft. No distension. No tenderness. No rebound and no guarding.   Musculoskeletal:  Left leg larger than right, chronic. No obvious deformity.      Generalized tenderness to her distal left femur knee and tib/fib ankle with    limited range of motion.   Neurological:   Alert and oriented to person, place, and time.           Moves all 4 extremities spontaneously . GCS 15.   Skin:    No rash noted. No pallor. No erythema or ecchymosis to left leg      Emergency Department Course   Imaging:  Radiographic findings were communicated with the patient and family who voiced understanding of the findings.    CT Knee Left w/o Contrast  Preliminary Result  IMPRESSION: Minimally displaced avulsion fracture at the tibial  attachment of the posterior cruciate ligament. There is an associated  lipohemarthrosis.    US Lower Extremity Venous Duplex Bilateral  Final Result  IMPRESSION: No evidence of deep vein thrombosis.  AUDRA HOWE MD    Tib/Fib XR, left  Final Result  IMPRESSION: No acute fracture is seen. The bones are osteopenic. There  are multiple soft tissue calcifications.  AKBAR RIZZO MD    Femur XR,  2 views, left  Final Result  IMPRESSION: There are three fixation screws in the left femoral neck.  The bones are osteopenic. No acute fracture is seen. However, there  appears to be a fat-fluid level in the knee which is concerning for an  occult  fracture.  AKBAR RIZZO MD    Ankle XR, G/E 3 views, left  Final Result  IMPRESSION: Osteopenia. No acute fracture is seen. There is medial  soft tissue swelling. The ankle mortise is congruent.  AKBAR RIZZO MD    Knee XR, 1-2 views, left  Final Result  IMPRESSION: No evidence of fracture.  AKBAR RIZZO MD    Laboratory:   CBC:  WBC 9.4, HGB 12.3, , otherwise WNL  BMP: Glucose 125 o/w WNL. (Creatinine 0.55)    Interventions:  07:53 Percocet 1 tablet Oral    Emergency Department Course:  Nursing notes and vitals reviewed.    07:42 I performed an exam of the patient as documented above.     The patient received the intervention(s) above.    The patient was taken for x-rays, CT scan, and ultrasound, see imaging results above.     Nurse reports the patient is not able to ambulate secondary to severe knee pain.    10:35 Discussed the patient with Dr. Jo, who will admit the patient to a obs bed for further monitoring, evaluation, and treatment.     11:12 Discussed the patient with Dr. Jo.     11:16  Discussed the patient with Dr. David, of the orthopedic surgery service. They agree with the plan and will evaluate the patient in the hospital.     Recheck patient. Findings and plan explained to the Patient and daughter who consents to admission.   Impression & Plan    Medical Decision Making:  Won Huggins is a 76 year old female that came in status post fall yesterday. She was complaining of isolated pain to her left lower leg. History of increased size is chronic. I did obtain x-ray. In the x-rays that showed signs for possible occult fracture with no obvious displaced fracture with increased size of her leg. This is followed by ultrasound which is negative for DVT and CT imaging which shows confirmed avulsion fracture along her PCL, which explains her symptoms. We did attempt weight bearing with and without walker and knee immobilizer. Unfortunately, she is unable to ambulate for herself therefore at home  she will need further assisted care and living. I did consult with ortho that discussed who discussed after review would not be surgical and should follow up in outpatient setting. The patient will be admitted for observation for further pain management and discharge planning.     Diagnosis:    ICD-10-CM    1. Leg fracture, unspecified laterality, closed, initial encounter S82.90XA CBC + differential     Basic metabolic panel (BMP)       Disposition:  Admitted to obs bed under the care of Dr. oJ.     Scribe Disclosure:  I, Martha Hobson, am serving as a scribe at 07:42 AM on 5/23/2017 to document services personally performed by Ceferino Prasad MD, based on my observations and the provider's statements to me.  Martha Hobson  5/23/2017   [facility] EMERGENCY DEPARTMENT       Ceferino Prasad MD  05/23/17 2088

## 2017-05-23 NOTE — ED NOTES
Patient presents to the ED with left knee pain. Patient fell yesterday and landed on the knee. Pain and swelling since. EMS administered 25 mcf of fentanyl, patient states pain is improved.

## 2017-05-23 NOTE — PHARMACY-ADMISSION MEDICATION HISTORY
Admission medication history interview status for this patient is complete. See [facility] admission navigator for allergy information, prior to admission medications and immunization status.     Medication history interview source(s):Patient, Family and EPIC discharge paperwork from 4/10/2017  Medication history resources (including written lists, pill bottles, clinic record):becky used to interpret  Primary pharmacy:[facility], on [address]    Changes made to PTA medication list:  Added: ----  Deleted: -----  Changed: -----    Actions taken by pharmacist (provider contacted, etc):None     Additional medication history information:None    Medication reconciliation/reorder completed by provider prior to medication history? No      Prior to Admission medications    Medication Sig Last Dose Taking? Auth Provider   ranitidine (ZANTAC) 150 MG tablet Take 1 tablet (150 mg) by mouth 2 times daily 5/22/2017 at pm Yes Abdullahi Osorio MD   metoprolol (LOPRESSOR) 25 MG tablet Take 1 tablet (25 mg) by mouth 2 times daily 5/22/2017 at pm Yes Abdullahi Osorio MD   Acetaminophen (TYLENOL PO) Take 325-650 mg by mouth every 4 hours as needed for mild pain or fever 5/23/2017 at am-650mg Yes Unknown, Entered By History   albuterol (PROAIR HFA/PROVENTIL HFA/VENTOLIN HFA) 108 (90 BASE) MCG/ACT Inhaler Inhale 1-2 puffs into the lungs every 4 hours as needed for shortness of breath / dyspnea or wheezing 5/22/2017 at pm Yes Unknown, Entered By History   aspirin 81 MG EC tablet Take 81 mg by mouth daily 5/22/2017 at am Yes Unknown, Entered By History   cetirizine (ZYRTEC) 10 MG tablet Take 10 mg by mouth daily 5/22/2017 at am Yes Unknown, Entered By History   fluticasone (FLONASE) 50 MCG/ACT spray Spray 2 sprays into both nostrils daily as needed for rhinitis or allergies Past Month at Unknown time Yes Unknown, Entered By History   fish oil-omega-3 fatty acids 1000 MG capsule Take 1 g by mouth daily 5/22/2017 at am Yes  Unknown, Entered By History   zolpidem (AMBIEN) 5 MG tablet Take 5 mg by mouth nightly as needed for sleep 5/22/2017 at hs Yes Unknown, Entered By History   guaiFENesin-codeine (ROBITUSSIN AC) 100-10 MG/5ML SOLN solution Take 1-2 tsp. by mouth 3 times daily as needed for cough 5/22/2017 at pm Yes Unknown, Entered By History   HYDROcodone-acetaminophen (NORCO) 5-325 MG per tablet Take 1-2 tablets by mouth every 4 hours as needed for moderate to severe pain Past Month at Unknown time Yes Marlen Harper MD   erlotinib (TARCEVA) 100 MG TABS Take 100 mg by mouth every other day  5/21/2017  Unknown, Entered By History

## 2017-05-23 NOTE — IP AVS SNAPSHOT
[facility] Observation Department    [address]    [facility] [address]    Phone:  [phone]                                       After Visit Summary   5/23/2017    Won Huggins    MRN: [id number]           After Visit Summary Signature Page     I have received my discharge instructions, and my questions have been answered. I have discussed any challenges I see with this plan with the nurse or doctor.    ..........................................................................................................................................  Patient/Patient Representative Signature      ..........................................................................................................................................  Patient Representative Print Name and Relationship to Patient    ..................................................               ................................................  Date                                            Time    ..........................................................................................................................................  Reviewed by Signature/Title    ...................................................              ..............................................  Date                                                            Time

## 2017-05-23 NOTE — IP AVS SNAPSHOT
MRN:[id number]                      After Visit Summary   5/23/2017    Te Huggins    MRN: [id number]           Thank you!     Thank you for choosing [facility] for your care. Our goal is always to provide you with excellent care. Hearing back from our patients is one way we can continue to improve our services. Please take a few minutes to complete the written survey that you may receive in the mail after you visit. If you would like to speak to someone directly about your visit please contact Patient Relations at [phone]. Thank you!          Patient Information     Date Of Birth          1940        About your hospital stay     You were admitted on:  May 23, 2017 You last received care in the:  [facility] Observation Department    You were discharged on:  May 24, 2017        Reason for your hospital stay       Tibial fracture                  Who to Call     For medical emergencies, please call 911.  For non-urgent questions about your medical care, please call your primary care provider or clinic, None          Attending Provider     Provider Specialty    Ceferino Prasad MD Emergency Medicine    Sandro, Gee Light MD Internal Medicine       Primary Care Provider Fax #    [facility] [phone]       No address on file        After Care Instructions     Activity       Your activity upon discharge: activity as tolerated            Diet       Follow this diet upon discharge: Regular                  Follow-up Appointments     Follow-up and recommended labs and tests        Follow up with Dr. David (Orthopedic Surgeon at [facility], [phone]) in 7-10 days                  Additional Services     Home Care PT Referral for Hospital Discharge       PT to eval and treat    Your provider has ordered home care - physical therapy. If you have not been contacted within 2 days of your discharge please call the department phone number listed on the  "top of this document.                             Pending Results     No orders found for last 3 day(s).            Statement of Approval     Ordered          17 1155  I have reviewed and agree with all the recommendations and orders detailed in this document.  EFFECTIVE NOW     Approved and electronically signed by:  Gee Jo MD             Admission Information     Date & Time Provider Department Dept. Phone    2017 Gee Jo MD [facility] Observation Department [phone]      Your Vitals Were     Blood Pressure Pulse Temperature Respirations Pulse Oximetry       116/67 (BP Location: Right arm) 61 97.5  F (36.4  C) (Oral) 16 97%       MyChart Information     [employer] lets you send messages to your doctor, view your test results, renew your prescriptions, schedule appointments and more. To sign up, go to [email or web address] . Click on \"Log in\" on the left side of the screen, which will take you to the Welcome page. Then click on \"Sign up Now\" on the right side of the page.     You will be asked to enter the access code listed below, as well as some personal information. Please follow the directions to create your username and password.     Your access code is: [email or web address]  Expires: 2017  3:40 PM     Your access code will  in 90 days. If you need help or a new code, please call your [facility] clinic or [phone].        Care EveryWhere ID     This is your Care EveryWhere ID. This could be used by other organizations to access your [facility] medical records  [id number]           Review of your medicines      CONTINUE these medicines which have NOT CHANGED        Dose / Directions    albuterol 108 (90 BASE) MCG/ACT Inhaler   Commonly known as:  PROAIR HFA/PROVENTIL HFA/VENTOLIN HFA        Dose:  1-2 puff   Inhale 1-2 puffs into the lungs every 4 hours as needed for shortness of breath / dyspnea or wheezing   Refills:  0       aspirin 81 MG EC tablet        " Dose:  81 mg   Take 81 mg by mouth daily   Refills:  0       cetirizine 10 MG tablet   Commonly known as:  zyrTEC        Dose:  10 mg   Take 10 mg by mouth daily   Refills:  0       fish oil-omega-3 fatty acids 1000 MG capsule        Dose:  1 g   Take 1 g by mouth daily   Refills:  0       fluticasone 50 MCG/ACT spray   Commonly known as:  FLONASE        Dose:  2 spray   Spray 2 sprays into both nostrils daily as needed for rhinitis or allergies   Refills:  0       guaiFENesin-codeine 100-10 MG/5ML Soln solution   Commonly known as:  ROBITUSSIN AC        Dose:  1-2 tsp.   Take 1-2 tsp. by mouth 3 times daily as needed for cough   Refills:  0       HYDROcodone-acetaminophen 5-325 MG per tablet   Commonly known as:  NORCO   Used for:  Leg fracture, unspecified laterality, closed, initial encounter        Dose:  1-2 tablet   Take 1-2 tablets by mouth every 4 hours as needed for moderate to severe pain   Quantity:  15 tablet   Refills:  0       metoprolol 25 MG tablet   Commonly known as:  LOPRESSOR   Used for:  Chest pain, unspecified type, Essential hypertension        Dose:  25 mg   Take 1 tablet (25 mg) by mouth 2 times daily   Quantity:  60 tablet   Refills:  0       ranitidine 150 MG tablet   Commonly known as:  ZANTAC   Used for:  Gastroesophageal reflux disease without esophagitis        Dose:  150 mg   Take 1 tablet (150 mg) by mouth 2 times daily   Quantity:  30 tablet   Refills:  0       TARCEVA 100 MG Tabs   Generic drug:  erlotinib        Dose:  100 mg   Take 100 mg by mouth every other day   Refills:  0       TYLENOL PO        Dose:  325-650 mg   Take 325-650 mg by mouth every 4 hours as needed for mild pain or fever   Refills:  0       zolpidem 5 MG tablet   Commonly known as:  AMBIEN        Dose:  5 mg   Take 5 mg by mouth nightly as needed for sleep   Refills:  0            Where to get your medicines      Some of these will need a paper prescription and others can be bought over the counter. Ask your  nurse if you have questions.     Bring a paper prescription for each of these medications     HYDROcodone-acetaminophen 5-325 MG per tablet                Protect others around you: Learn how to safely use, store and throw away your medicines at www.disposemymeds.org.             Medication List: This is a list of all your medications and when to take them. Check marks below indicate your daily home schedule. Keep this list as a reference.      Medications           Morning Afternoon Evening Bedtime As Needed    albuterol 108 (90 BASE) MCG/ACT Inhaler   Commonly known as:  PROAIR HFA/PROVENTIL HFA/VENTOLIN HFA   Inhale 1-2 puffs into the lungs every 4 hours as needed for shortness of breath / dyspnea or wheezing                                aspirin 81 MG EC tablet   Take 81 mg by mouth daily   Last time this was given:  81 mg on 5/24/2017  8:34 AM                                cetirizine 10 MG tablet   Commonly known as:  zyrTEC   Take 10 mg by mouth daily   Last time this was given:  10 mg on 5/24/2017  8:34 AM                                fish oil-omega-3 fatty acids 1000 MG capsule   Take 1 g by mouth daily                                fluticasone 50 MCG/ACT spray   Commonly known as:  FLONASE   Spray 2 sprays into both nostrils daily as needed for rhinitis or allergies                                guaiFENesin-codeine 100-10 MG/5ML Soln solution   Commonly known as:  ROBITUSSIN AC   Take 1-2 tsp. by mouth 3 times daily as needed for cough                                HYDROcodone-acetaminophen 5-325 MG per tablet   Commonly known as:  NORCO   Take 1-2 tablets by mouth every 4 hours as needed for moderate to severe pain   Last time this was given:  2 tablets on 5/24/2017  8:34 AM                                metoprolol 25 MG tablet   Commonly known as:  LOPRESSOR   Take 1 tablet (25 mg) by mouth 2 times daily   Last time this was given:  25 mg on 5/24/2017  8:35 AM                                 ranitidine 150 MG tablet   Commonly known as:  ZANTAC   Take 1 tablet (150 mg) by mouth 2 times daily   Last time this was given:  150 mg on 5/24/2017  8:34 AM                                TARCEVA 100 MG Tabs   Take 100 mg by mouth every other day   Generic drug:  erlotinib                                TYLENOL PO   Take 325-650 mg by mouth every 4 hours as needed for mild pain or fever                                zolpidem 5 MG tablet   Commonly known as:  AMBIEN   Take 5 mg by mouth nightly as needed for sleep
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
## 2017-05-24 ENCOUNTER — APPOINTMENT (OUTPATIENT)
Dept: PHYSICAL THERAPY | Facility: CLINIC | Age: 77
End: 2017-05-24
Payer: COMMERCIAL

## 2017-05-24 VITALS
SYSTOLIC BLOOD PRESSURE: 147 MMHG | OXYGEN SATURATION: 97 % | HEART RATE: 55 BPM | DIASTOLIC BLOOD PRESSURE: 66 MMHG | TEMPERATURE: 98.3 F | RESPIRATION RATE: 16 BRPM

## 2017-05-24 PROCEDURE — 97116 GAIT TRAINING THERAPY: CPT | Mod: GP | Performed by: PHYSICAL THERAPIST

## 2017-05-24 PROCEDURE — 97530 THERAPEUTIC ACTIVITIES: CPT | Mod: GP | Performed by: PHYSICAL THERAPIST

## 2017-05-24 PROCEDURE — G0378 HOSPITAL OBSERVATION PER HR: HCPCS

## 2017-05-24 PROCEDURE — 40000193 ZZH STATISTIC PT WARD VISIT: Performed by: PHYSICAL THERAPIST

## 2017-05-24 PROCEDURE — 25000132 ZZH RX MED GY IP 250 OP 250 PS 637: Performed by: HOSPITALIST

## 2017-05-24 PROCEDURE — 99217 ZZC OBSERVATION CARE DISCHARGE: CPT | Performed by: HOSPITALIST

## 2017-05-24 PROCEDURE — 97161 PT EVAL LOW COMPLEX 20 MIN: CPT | Mod: GP | Performed by: PHYSICAL THERAPIST

## 2017-05-24 RX ORDER — HYDROCODONE BITARTRATE AND ACETAMINOPHEN 5; 325 MG/1; MG/1
1-2 TABLET ORAL EVERY 4 HOURS PRN
Qty: 15 TABLET | Refills: 0 | Status: ON HOLD | OUTPATIENT
Start: 2017-05-24 | End: 2017-11-02

## 2017-05-24 RX ADMIN — ASPIRIN 81 MG: 81 TABLET, COATED ORAL at 08:34

## 2017-05-24 RX ADMIN — METOPROLOL TARTRATE 25 MG: 25 TABLET ORAL at 08:35

## 2017-05-24 RX ADMIN — HYDROXYZINE HYDROCHLORIDE 25 MG: 25 TABLET ORAL at 01:21

## 2017-05-24 RX ADMIN — CETIRIZINE HYDROCHLORIDE 10 MG: 10 TABLET, FILM COATED ORAL at 08:34

## 2017-05-24 RX ADMIN — HYDROCODONE BITARTRATE AND ACETAMINOPHEN 2 TABLET: 5; 325 TABLET ORAL at 08:34

## 2017-05-24 RX ADMIN — RANITIDINE HYDROCHLORIDE 150 MG: 150 TABLET, FILM COATED ORAL at 08:34

## 2017-05-24 RX ADMIN — HYDROCODONE BITARTRATE AND ACETAMINOPHEN 2 TABLET: 5; 325 TABLET ORAL at 01:20

## 2017-05-24 NOTE — PLAN OF CARE
Problem: Discharge Planning  Goal: Discharge Planning (Adult, OB, Behavioral, Peds)  Outcome: No Change  PRIMARY DIAGNOSIS: Acute Traumatic Pain  OUTPATIENT/OBSERVATION GOALS TO BE MET BEFORE DISCHARGE:     1. Tolerate PO Intake: Yes  2. Cleared for discharge from consultants involved: No - orthopedic, PT  3. ADLs back to baseline?  No - at baseline needs walker, and sometimes help at home with mobility   4. Activity and level of assistance: Assist of 2 and walker  5. Pain status: Improved-controlled with oral pain medications, home percocet given   6. Barriers to discharge noted Yes - lives with daughter who isn't always home,  is able to help a little with cares     Pt. Up to commode with assist of 2 and 2WW, pt. Reports numbness from ankle to toes - improved from this morning.  Pt's pain goal is 6/10, pt. Reports pain is improved but still 8-9/10

## 2017-05-24 NOTE — PLAN OF CARE
Problem: Discharge Planning  Goal: Discharge Planning (Adult, OB, Behavioral, Peds)  Outcome: Improving  PRIMARY DIAGNOSIS: Acute Traumatic Pain  OUTPATIENT/OBSERVATION GOALS TO BE MET BEFORE DISCHARGE:      1. Tolerate PO Intake: Yes  2. Cleared for discharge from consultants involved: No - orthopedic, PT  3. ADLs back to baseline? No - at baseline needs walker, and sometimes help at home with mobility   4. Activity and level of assistance: Assist of 2 and walker  5. Pain status: Improved-controlled with oral pain medications, home percocet given   6. Barriers to discharge noted Yes - lives with daughter who isn't always home,  is able to help a little with cares      Pt. Up to commode with assist of 2 and 2WW, pt. Reports numbness from ankle to toes - improved from this morning. Has wrap to left leg and it is elevated on pillow.  Cms is intact.   Family at bedside.  Patient is taking oral analgesics when requested.  Her family plans on staying overnight with her.  They waived right to  but there is one ordered in case family is not present for morning.  Y[place]uba  ordered for 9 am to 12 pm.

## 2017-05-24 NOTE — DOWNTIME EVENT NOTE
The EMR was down for 3 hoursand 25 mins on 5/24/2017.    Yaneth correa was responsible for completing the paper charting during this time period.     The following information was re-entered into the system by Yaneth Correa: Flowsheet data and care plan documentation    The following information will remain in the paper chart: n/a    Yaneth Correa  5/24/2017

## 2017-05-24 NOTE — PROGRESS NOTES
Home PT Referral has been sent to [facility]    Cheryl Luz RN BSN CTS  [facility]   Care Management Coordinator  [email or web address]   [phone]

## 2017-05-24 NOTE — PROGRESS NOTES
05/24/17 1108   Quick Adds   Quick Adds Certification   Type of Visit Initial PT Evaluation       Present no   Language [place]   Living Environment   Lives With child(thais), adult;spouse   Living Arrangements house  (ranch style home)   Number of Stairs to Enter Home 1   Number of Stairs Within Home 0   Stair Railings at Home none   Transportation Available family or friend will provide   Self-Care   Dominant Hand right   Usual Activity Tolerance good   Current Activity Tolerance fair   Regular Exercise no   Activity/Exercise/Self-Care Comment Owns both a FWW and 4WW   Functional Level Prior   Ambulation 1-->assistive equipment   Transferring 0-->independent   Toileting 0-->independent   Bathing 0-->independent   Dressing 0-->independent   Eating 0-->independent   Communication 0-->understands/communicates without difficulty   Swallowing 0-->swallows foods/liquids without difficulty   Cognition 0 - no cognition issues reported   Fall history within last six months yes   Number of times patient has fallen within last six months 1   Which of the above functional risks had a recent onset or change? ambulation;transferring   General Information   Onset of Illness/Injury or Date of Surgery - Date 05/23/17   Referring Physician Gee Jo MD   Patient/Family Goals Statement Return home if possible   Pertinent History of Current Problem (include personal factors and/or comorbidities that impact the POC) Te Huggins is a 76 year old female with history colon CA (many years ago, s/p colostomy), lungs ca (lobectomy, details not clear),  who presents with mechanical fall and knee pain with tibial avulsion fracture   Precautions/Limitations fall precautions   Weight-Bearing Status - LLE weight-bearing as tolerated   Weight-Bearing Status - RLE full weight-bearing   General Observations Pt supine upon initiation of PT. Agreeable to session.    Cognitive Status Examination   Orientation orientation to  "person, place and time   Level of Consciousness alert   Follows Commands and Answers Questions 100% of the time   Personal Safety and Judgment intact   Memory intact   Pain Assessment   Patient Currently in Pain Yes, see Vital Sign flowsheet   Integumentary/Edema   Integumentary/Edema Comments Ace wrap present on L LE   Posture    Posture Forward head position   Range of Motion (ROM)   ROM Comment L LE limited secondary to pain   Strength   Strength Comments Able to SLR B   Bed Mobility   Bed Mobility Comments sit<>supine SBA   Transfer Skills   Transfer Comments sit<>stand SBA   Gait   Gait Comments ambulates with 4WW and SBA   Sensory Examination   Sensory Perception no deficits were identified   Coordination   Coordination no deficits were identified   Muscle Tone   Muscle Tone no deficits were identified   Modality Interventions   Planned Modality Interventions Cryotherapy   General Therapy Interventions   Planned Therapy Interventions gait training;transfer training   Clinical Impression   Criteria for Skilled Therapeutic Intervention yes, treatment indicated   PT Diagnosis impaired mobility   Influenced by the following impairments pain   Functional limitations due to impairments Difficulty with ambulation, transfers   Clinical Presentation Stable/Uncomplicated   Clinical Presentation Rationale medically stable, stable fracture   Clinical Decision Making (Complexity) Low complexity   Therapy Frequency` daily   Predicted Duration of Therapy Intervention (days/wks) 1 day   Anticipated Discharge Disposition Home with Home Therapy   Risk & Benefits of therapy have been explained Yes   Patient, Family & other staff in agreement with plan of care Yes   Therapy Certification   Start of care date 05/24/17   Certification date from 05/24/17   Certification date to 05/24/17   Medical Diagnosis avulsion fracture.    [facility] AM-PAC TM \"6 Clicks\"   2016, Trustees of [facility], under license to [employer] " "LLC.  All rights reserved.   6 Clicks Short Forms Basic Mobility Inpatient Short Form   [facility] AM-PAC  \"6 Clicks\" V.2 Basic Mobility Inpatient Short Form   1. Turning from your back to your side while in a flat bed without using bedrails? 4 - None   2. Moving from lying on your back to sitting on the side of a flat bed without using bedrails? 4 - None   3. Moving to and from a bed to a chair (including a wheelchair)? 3 - A Little   4. Standing up from a chair using your arms (e.g., wheelchair, or bedside chair)? 4 - None   5. To walk in hospital room? 3 - A Little   6. Climbing 3-5 steps with a railing? 3 - A Little   Basic Mobility Raw Score (Score out of 24.Lower scores equate to lower levels of function) 21   Total Evaluation Time   Total Evaluation Time (Minutes) 10     "

## 2017-05-24 NOTE — PLAN OF CARE
Problem: Goal Outcome Summary  Goal: Goal Outcome Summary  PT: Orders received. Evaluation and treatment completed. At baseline, pt lives with adult daughter and spouse in ranch style home. 1 step to enter and no steps within. Pt's daughter reports that there will be someone with the patient everyday following discharge. Pt owns both a FWW and 4WW. Today, pt is limited secondary to pain. Pt completes sit<>supine with SBA. Pt completes sit<>stand with SBA and cues for safe hand placement. Pt ambulates 100' with use of 4WW and SBA. Pt ambulates slowly, but steady throughout. Pt and daughter cued to utilize FWW at this time as it will be more stable than 4WW. Rec pt discharge home with assist from family and HHPT. Pt and daughter agreeable to HHPT. No further PT needs.      Physical Therapy Discharge Summary     Reason for therapy discharge:    All goals and outcomes met, no further needs identified.     Progress towards therapy goal(s). See goals on Care Plan in [facility] electronic health record for goal details.  Goals met     Therapy recommendation(s):    Continued therapy is recommended.  Rationale/Recommendations:  HHPT to work toward indep with mobility.

## 2017-05-24 NOTE — PLAN OF CARE
Problem: Discharge Planning  Goal: Discharge Planning (Adult, OB, Behavioral, Peds)  Outcome: Adequate for Discharge Date Met:  05/24/17  Patient's After Visit Summary was reviewed with patient   Patient verbalized understanding of After Visit Summary, recommended follow up and was given an opportunity to ask questions.   Discharge medications sent home with patient/family: No - paper script sent with family   Discharged with daughter, Agueda     OBSERVATION patient END time: 1245

## 2017-05-24 NOTE — PLAN OF CARE
Problem: Discharge Planning  Goal: Discharge Planning (Adult, OB, Behavioral, Peds)  Outcome: Improving  PRIMARY DIAGNOSIS: Acute Traumatic Pain  OUTPATIENT/OBSERVATION GOALS TO BE MET BEFORE DISCHARGE:      1. Tolerate PO Intake: Yes  2. Cleared for discharge from consultants involved: No - orthopedic, PT  3. ADLs back to baseline? No - at baseline needs walker, and sometimes help at home with mobility   4. Activity and level of assistance: Assist of 2 and walker  5. Pain status: Improved-controlled with oral pain medications, home percocet given   6. Barriers to discharge noted Yes - lives with daughter who isn't always home,  is able to help a little with cares      Pt. VSS pt. C/o pain 6/10 prn percocet given x1. 3+ edema noted in loer left leg, ankle, and foot. Has wrap to left leg and it is elevated on pillow and gel pack given Cms is intact.  Up to commode with assist of 2. Her daughter stay in room with patient overnight.  They waived right to  but there is one ordered.  Qatari was  ordered for 9 am to 12 pm but has been canceled. Will continue to monitor, assess, and offer supportive cares.
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
## 2017-05-24 NOTE — PLAN OF CARE
Problem: Discharge Planning  Goal: Discharge Planning (Adult, OB, Behavioral, Peds)  Outcome: Improving  PRIMARY DIAGNOSIS: Acute Traumatic Pain  OUTPATIENT/OBSERVATION GOALS TO BE MET BEFORE DISCHARGE:      1. Tolerate PO Intake: Yes  2. Cleared for discharge from consultants involved: No - orthopedic, PT  3. ADLs back to baseline? No - at baseline needs walker, and sometimes help at home with mobility   4. Activity and level of assistance: Assist of 2 and walker  5. Pain status: Improved-controlled with oral pain medications, home percocet given   6. Barriers to discharge noted Yes - lives with daughter who isn't always home,  is able to help a little with cares      Pt. VSS pt. Reports numbness from ankle to toes - improved from this morning. Has wrap to left leg and it is elevated on pillow.  Cms is intact.  Up to commode with assist of 2. Family at bedside. C/o pain 6/10 prn percocet and atarax given.  Her family plans on staying overnight with her.  They waived right to  but there is one ordered in case family is not present for morning.  Serbian  ordered for 9 am to 12 pm.   Will continue to monitor, assess, and offer supportive cares.

## 2017-05-24 NOTE — PLAN OF CARE
Problem: Discharge Planning  Goal: Discharge Planning (Adult, OB, Behavioral, Peds)  Problem: Discharge Planning  Goal: Discharge Planning (Adult, OB, Behavioral, Peds)  Outcome: Improving  PRIMARY DIAGNOSIS: Acute Traumatic Pain  OUTPATIENT/OBSERVATION GOALS TO BE MET BEFORE DISCHARGE:      1. Tolerate PO Intake: Yes  2. Cleared for discharge from consultants involved: No - PT  3. ADLs back to baseline? No - at baseline needs walker, and sometimes help at home with mobility   4. Activity and level of assistance: Assist of 1-2 and walker  5. Pain status: Improved-controlled with oral pain medications, NORCO given - pt. Takes at home   6. Barriers to discharge noted Yes - lives with daughter who isn't always home,  is able to help a little with cares      Pt. VSS pt. C/o pain 5/10, prn NORCO given.  Pt. Reports pain is much improved from yesterday.  Pt. Has swelling of left leg from knee to ankle, worsened from yesterday. Has wrap to left leg and it is elevated on pillow.  CMS intact.

## 2017-10-01 ENCOUNTER — HOSPITAL ENCOUNTER (EMERGENCY)
Facility: CLINIC | Age: 77
Discharge: HOME | End: 2017-10-01
Attending: EMERGENCY MEDICINE | Admitting: EMERGENCY MEDICINE
Payer: COMMERCIAL

## 2017-10-01 ENCOUNTER — APPOINTMENT (OUTPATIENT)
Dept: CT IMAGING | Facility: CLINIC | Age: 77
End: 2017-10-01
Attending: EMERGENCY MEDICINE
Payer: COMMERCIAL

## 2017-10-01 ENCOUNTER — APPOINTMENT (OUTPATIENT)
Dept: MRI IMAGING | Facility: CLINIC | Age: 77
End: 2017-10-01
Attending: EMERGENCY MEDICINE
Payer: COMMERCIAL

## 2017-10-01 VITALS
RESPIRATION RATE: 18 BRPM | DIASTOLIC BLOOD PRESSURE: 81 MMHG | HEIGHT: 58 IN | WEIGHT: 120 LBS | OXYGEN SATURATION: 98 % | HEART RATE: 65 BPM | TEMPERATURE: 97 F | SYSTOLIC BLOOD PRESSURE: 171 MMHG | BODY MASS INDEX: 25.19 KG/M2

## 2017-10-01 DIAGNOSIS — M54.5 ACUTE MIDLINE LOW BACK PAIN, WITH SCIATICA PRESENCE UNSPECIFIED: ICD-10-CM

## 2017-10-01 LAB
ALBUMIN SERPL-MCNC: 3.1 G/DL (ref 3.4–5)
ALBUMIN UR-MCNC: NEGATIVE MG/DL
ALP SERPL-CCNC: 59 U/L (ref 40–150)
ALT SERPL W P-5'-P-CCNC: 17 U/L (ref 0–50)
ANION GAP SERPL CALCULATED.3IONS-SCNC: 5 MMOL/L (ref 3–14)
APPEARANCE UR: CLEAR
AST SERPL W P-5'-P-CCNC: 16 U/L (ref 0–45)
BASOPHILS # BLD AUTO: 0 10E9/L (ref 0–0.2)
BASOPHILS NFR BLD AUTO: 0.3 %
BILIRUB SERPL-MCNC: 0.6 MG/DL (ref 0.2–1.3)
BILIRUB UR QL STRIP: NEGATIVE
BUN SERPL-MCNC: 15 MG/DL (ref 7–30)
CALCIUM SERPL-MCNC: 9.4 MG/DL (ref 8.5–10.1)
CHLORIDE SERPL-SCNC: 104 MMOL/L (ref 94–109)
CO2 SERPL-SCNC: 30 MMOL/L (ref 20–32)
COLOR UR AUTO: YELLOW
CREAT SERPL-MCNC: 0.61 MG/DL (ref 0.52–1.04)
DIFFERENTIAL METHOD BLD: NORMAL
EOSINOPHIL # BLD AUTO: 0.1 10E9/L (ref 0–0.7)
EOSINOPHIL NFR BLD AUTO: 1.5 %
ERYTHROCYTE [DISTWIDTH] IN BLOOD BY AUTOMATED COUNT: 12.9 % (ref 10–15)
GFR SERPL CREATININE-BSD FRML MDRD: >90 ML/MIN/1.7M2
GLUCOSE SERPL-MCNC: 96 MG/DL (ref 70–99)
GLUCOSE UR STRIP-MCNC: NEGATIVE MG/DL
HCT VFR BLD AUTO: 37.2 % (ref 35–47)
HGB BLD-MCNC: 12.3 G/DL (ref 11.7–15.7)
HGB UR QL STRIP: NEGATIVE
IMM GRANULOCYTES # BLD: 0 10E9/L (ref 0–0.4)
IMM GRANULOCYTES NFR BLD: 0.6 %
KETONES UR STRIP-MCNC: NEGATIVE MG/DL
LEUKOCYTE ESTERASE UR QL STRIP: NEGATIVE
LIPASE SERPL-CCNC: 135 U/L (ref 73–393)
LYMPHOCYTES # BLD AUTO: 1.2 10E9/L (ref 0.8–5.3)
LYMPHOCYTES NFR BLD AUTO: 16.1 %
MCH RBC QN AUTO: 30.1 PG (ref 26.5–33)
MCHC RBC AUTO-ENTMCNC: 33.1 G/DL (ref 31.5–36.5)
MCV RBC AUTO: 91 FL (ref 78–100)
MONOCYTES # BLD AUTO: 0.7 10E9/L (ref 0–1.3)
MONOCYTES NFR BLD AUTO: 9 %
NEUTROPHILS # BLD AUTO: 5.2 10E9/L (ref 1.6–8.3)
NEUTROPHILS NFR BLD AUTO: 72.5 %
NITRATE UR QL: NEGATIVE
NRBC # BLD AUTO: 0 10*3/UL
NRBC BLD AUTO-RTO: 0 /100
PH UR STRIP: 7 PH (ref 5–7)
PLATELET # BLD AUTO: 166 10E9/L (ref 150–450)
POTASSIUM SERPL-SCNC: 3.6 MMOL/L (ref 3.4–5.3)
PROT SERPL-MCNC: 7.7 G/DL (ref 6.8–8.8)
RBC # BLD AUTO: 4.08 10E12/L (ref 3.8–5.2)
RBC #/AREA URNS AUTO: <1 /HPF (ref 0–2)
SODIUM SERPL-SCNC: 139 MMOL/L (ref 133–144)
SOURCE: NORMAL
SP GR UR STRIP: 1.01 (ref 1–1.03)
SQUAMOUS #/AREA URNS AUTO: <1 /HPF (ref 0–1)
UROBILINOGEN UR STRIP-MCNC: 0 MG/DL (ref 0–2)
WBC # BLD AUTO: 7.2 10E9/L (ref 4–11)
WBC #/AREA URNS AUTO: 1 /HPF (ref 0–2)

## 2017-10-01 PROCEDURE — 96376 TX/PRO/DX INJ SAME DRUG ADON: CPT

## 2017-10-01 PROCEDURE — 74177 CT ABD & PELVIS W/CONTRAST: CPT

## 2017-10-01 PROCEDURE — 25000128 H RX IP 250 OP 636: Performed by: EMERGENCY MEDICINE

## 2017-10-01 PROCEDURE — 83690 ASSAY OF LIPASE: CPT | Performed by: EMERGENCY MEDICINE

## 2017-10-01 PROCEDURE — 96375 TX/PRO/DX INJ NEW DRUG ADDON: CPT

## 2017-10-01 PROCEDURE — 25000125 ZZHC RX 250: Performed by: EMERGENCY MEDICINE

## 2017-10-01 PROCEDURE — 72148 MRI LUMBAR SPINE W/O DYE: CPT

## 2017-10-01 PROCEDURE — 25000132 ZZH RX MED GY IP 250 OP 250 PS 637: Performed by: EMERGENCY MEDICINE

## 2017-10-01 PROCEDURE — 72131 CT LUMBAR SPINE W/O DYE: CPT

## 2017-10-01 PROCEDURE — 85025 COMPLETE CBC W/AUTO DIFF WBC: CPT | Performed by: EMERGENCY MEDICINE

## 2017-10-01 PROCEDURE — 81001 URINALYSIS AUTO W/SCOPE: CPT | Performed by: EMERGENCY MEDICINE

## 2017-10-01 PROCEDURE — 99285 EMERGENCY DEPT VISIT HI MDM: CPT | Mod: 25

## 2017-10-01 PROCEDURE — 96374 THER/PROPH/DIAG INJ IV PUSH: CPT | Mod: 59

## 2017-10-01 PROCEDURE — 80053 COMPREHEN METABOLIC PANEL: CPT | Performed by: EMERGENCY MEDICINE

## 2017-10-01 RX ORDER — HYDROCODONE BITARTRATE AND ACETAMINOPHEN 5; 325 MG/1; MG/1
2 TABLET ORAL EVERY 6 HOURS PRN
Status: DISCONTINUED | OUTPATIENT
Start: 2017-10-01 | End: 2017-10-01 | Stop reason: HOSPADM

## 2017-10-01 RX ORDER — HYDROMORPHONE HCL/0.9% NACL/PF 0.2MG/0.2
0.2 SYRINGE (ML) INTRAVENOUS
Status: DISCONTINUED | OUTPATIENT
Start: 2017-10-01 | End: 2017-10-01 | Stop reason: HOSPADM

## 2017-10-01 RX ORDER — ONDANSETRON 4 MG/1
4 TABLET, ORALLY DISINTEGRATING ORAL EVERY 6 HOURS PRN
Qty: 20 TABLET | Refills: 0 | Status: ON HOLD | OUTPATIENT
Start: 2017-10-01 | End: 2017-11-02

## 2017-10-01 RX ORDER — IOPAMIDOL 755 MG/ML
500 INJECTION, SOLUTION INTRAVASCULAR ONCE
Status: COMPLETED | OUTPATIENT
Start: 2017-10-01 | End: 2017-10-01

## 2017-10-01 RX ORDER — HYDROCHLOROTHIAZIDE 12.5 MG/1
12.5 CAPSULE ORAL DAILY
COMMUNITY

## 2017-10-01 RX ORDER — ONDANSETRON 2 MG/ML
4 INJECTION INTRAMUSCULAR; INTRAVENOUS EVERY 30 MIN PRN
Status: DISCONTINUED | OUTPATIENT
Start: 2017-10-01 | End: 2017-10-01 | Stop reason: HOSPADM

## 2017-10-01 RX ORDER — HYDROMORPHONE HYDROCHLORIDE 1 MG/ML
0.5 INJECTION, SOLUTION INTRAMUSCULAR; INTRAVENOUS; SUBCUTANEOUS
Status: DISCONTINUED | OUTPATIENT
Start: 2017-10-01 | End: 2017-10-01

## 2017-10-01 RX ORDER — ONDANSETRON 4 MG/1
4 TABLET, ORALLY DISINTEGRATING ORAL ONCE
Status: COMPLETED | OUTPATIENT
Start: 2017-10-01 | End: 2017-10-01

## 2017-10-01 RX ADMIN — SODIUM CHLORIDE 56 ML: 9 INJECTION, SOLUTION INTRAVENOUS at 11:41

## 2017-10-01 RX ADMIN — Medication 0.2 MG: at 10:39

## 2017-10-01 RX ADMIN — ONDANSETRON 4 MG: 2 INJECTION INTRAMUSCULAR; INTRAVENOUS at 10:32

## 2017-10-01 RX ADMIN — IOPAMIDOL 60 ML: 755 INJECTION, SOLUTION INTRAVENOUS at 11:40

## 2017-10-01 RX ADMIN — ONDANSETRON 4 MG: 4 TABLET, ORALLY DISINTEGRATING ORAL at 17:53

## 2017-10-01 RX ADMIN — HYDROCODONE BITARTRATE AND ACETAMINOPHEN 2 TABLET: 5; 325 TABLET ORAL at 16:29

## 2017-10-01 RX ADMIN — Medication 0.2 MG: at 13:13

## 2017-10-01 ASSESSMENT — ENCOUNTER SYMPTOMS
VOMITING: 0
ABDOMINAL PAIN: 1
FEVER: 0
BACK PAIN: 1
CHILLS: 0

## 2017-10-01 ASSESSMENT — PAIN DESCRIPTION - DESCRIPTORS: DESCRIPTORS: ACHING

## 2017-10-01 NOTE — DISCHARGE INSTRUCTIONS
Take previously prescribed medications as instructed.  Please follow-up with your primary care doctor on Tuesday as needed for further symptom control.      Discharge Instructions  Back Pain  You were seen today for back pain. Back pain can have many causes, but most will get better without surgery or other specific treatment. Sometimes there is a herniated ( slipped ) disc. We do not usually do MRI scans to look for these right away, since most herniated discs will get better on their own with time.  Today, we did not find any evidence that your back pain was caused by a serious condition. However, sometimes symptoms develop over time and cannot be found during an emergency visit, so it is very important that you follow up with your primary provider.  Generally, every Emergency Department visit should have a follow-up clinic visit with either a primary or a specialty clinic/provider. Please follow-up as instructed by your emergency provider today.    Return to the Emergency Department if:    You develop a fever with your back pain.     You have weakness or change in sensation in one or both legs.    You lose control of your bowels or bladder, or cannot empty your bladder (cannot pee).    Your pain gets much worse.     Follow-up with your provider:    Unless your pain has completely gone away, please make an appointment with your provider within one week. Most of the routine care for back pain is available in a clinic and not the Emergency Department. You may need further management of your back pain, such as more pain medication, imaging such as an X-ray or MRI, or physical therapy.    What can I do to help myself?    Remain Active -- People are often afraid that they will hurt their back further or delay recovery by remaining active, but this is one of the best things you can do for your back. In fact, staying in bed for a long time to rest is not recommended. Studies have shown that people with low back pain  recover faster when they remain active. Movement helps to bring blood flow to the muscles and relieve muscle spasms as well as preventing loss of muscle strength.    Heat -- Using a heating pad can help with low back pain during the first few weeks. Do not sleep with a heating pad, as you can be burned.     Pain medications - You may take a pain medication such as Tylenol  (acetaminophen), Advil , Motrin  (ibuprofen) or Aleve  (naproxen).  If you were given a prescription for medicine here today, be sure to read all of the information (including the package insert) that comes with your prescription.  This will include important information about the medicine, its side effects, and any warnings that you need to know about.  The pharmacist who fills the prescription can provide more information and answer questions you may have about the medicine.  If you have questions or concerns that the pharmacist cannot address, please call or return to the Emergency Department.   Remember that you can always come back to the Emergency Department if you are not able to see your regular provider in the amount of time listed above, if you get any new symptoms, or if there is anything that worries you.

## 2017-10-01 NOTE — ED NOTES
Patient ambulated to bathroom with walker with stand by assistance of family. Patient now reporting some nausea. MD notified and prescribed zofran for discharge. Patient reports she is ready to go. Given one zofran ODT prior to leaving. Family supportive at bedside. Patient ambulated to car with walker.

## 2017-10-01 NOTE — ED AVS SNAPSHOT
[facility] Emergency Department    [address]    [facility] [address]    Phone:  [phone]    Fax:  [phone]                                       Won Huggins   MRN: [id number]    Department:  [facility] Emergency Department   Date of Visit:  10/1/2017           Patient Information     Date Of Birth          1940        Your diagnoses for this visit were:     Acute midline low back pain, with sciatica presence unspecified        You were seen by Hamlet Taylor MD.      Follow-up Information     Follow up with [facility] [place] In 2 days.    Specialty:  Family Practice        Follow up with [facility] Emergency Department.    Specialty:  EMERGENCY MEDICINE    Contact information:    [address] [facility] [address] [phone]        Follow up with [facility] Emergency Department.    Specialty:  EMERGENCY MEDICINE    Why:  As needed    Contact information:    [address] [facility] [address] [phone]        Discharge Instructions       Take previously prescribed medications as instructed.  Please follow-up with your primary care doctor on Tuesday as needed for further symptom control.      Discharge Instructions  Back Pain  You were seen today for back pain. Back pain can have many causes, but most will get better without surgery or other specific treatment. Sometimes there is a herniated ( slipped ) disc. We do not usually do MRI scans to look for these right away, since most herniated discs will get better on their own with time.  Today, we did not find any evidence that your back pain was caused by a serious condition. However, sometimes symptoms develop over time and cannot be found during an emergency visit, so it is very important that you follow up with your primary provider.  Generally, every Emergency Department visit should have a follow-up clinic visit with either a primary  or a specialty clinic/provider. Please follow-up as instructed by your emergency provider today.    Return to the Emergency Department if:    You develop a fever with your back pain.     You have weakness or change in sensation in one or both legs.    You lose control of your bowels or bladder, or cannot empty your bladder (cannot pee).    Your pain gets much worse.     Follow-up with your provider:    Unless your pain has completely gone away, please make an appointment with your provider within one week. Most of the routine care for back pain is available in a clinic and not the Emergency Department. You may need further management of your back pain, such as more pain medication, imaging such as an X-ray or MRI, or physical therapy.    What can I do to help myself?    Remain Active -- People are often afraid that they will hurt their back further or delay recovery by remaining active, but this is one of the best things you can do for your back. In fact, staying in bed for a long time to rest is not recommended. Studies have shown that people with low back pain recover faster when they remain active. Movement helps to bring blood flow to the muscles and relieve muscle spasms as well as preventing loss of muscle strength.    Heat -- Using a heating pad can help with low back pain during the first few weeks. Do not sleep with a heating pad, as you can be burned.     Pain medications - You may take a pain medication such as Tylenol  (acetaminophen), Advil , Motrin  (ibuprofen) or Aleve  (naproxen).  If you were given a prescription for medicine here today, be sure to read all of the information (including the package insert) that comes with your prescription.  This will include important information about the medicine, its side effects, and any warnings that you need to know about.  The pharmacist who fills the prescription can provide more information and answer questions you may have about the medicine.  If you have  questions or concerns that the pharmacist cannot address, please call or return to the Emergency Department.   Remember that you can always come back to the Emergency Department if you are not able to see your regular provider in the amount of time listed above, if you get any new symptoms, or if there is anything that worries you.      24 Hour Appointment Hotline       To make an appointment at any [facility], call [phone] ([phone]). If you don't have a family doctor or clinic, we will help you find one. [facility] clinics are conveniently located to serve the needs of you and your family.             Review of your medicines      Our records show that you are taking the medicines listed below. If these are incorrect, please call your family doctor or clinic.        Dose / Directions Last dose taken    albuterol 108 (90 BASE) MCG/ACT Inhaler   Commonly known as:  PROAIR HFA/PROVENTIL HFA/VENTOLIN HFA   Dose:  1-2 puff        Inhale 1-2 puffs into the lungs every 4 hours as needed for shortness of breath / dyspnea or wheezing   Refills:  0        aspirin 81 MG EC tablet   Dose:  81 mg        Take 81 mg by mouth daily   Refills:  0        cetirizine 10 MG tablet   Commonly known as:  zyrTEC   Dose:  10 mg        Take 10 mg by mouth daily   Refills:  0        fish oil-omega-3 fatty acids 1000 MG capsule   Dose:  1 g        Take 1 g by mouth daily   Refills:  0        fluticasone 50 MCG/ACT spray   Commonly known as:  FLONASE   Dose:  2 spray        Spray 2 sprays into both nostrils daily as needed for rhinitis or allergies   Refills:  0        guaiFENesin-codeine 100-10 MG/5ML Soln solution   Commonly known as:  ROBITUSSIN AC   Dose:  1-2 tsp.        Take 1-2 tsp. by mouth 3 times daily as needed for cough   Refills:  0        HYDROCHLOROTHIAZIDE PO   Dose:  12.5 mg        Take 12.5 mg by mouth   Refills:  0        HYDROcodone-acetaminophen 5-325 MG per tablet   Commonly known as:  NORCO   Dose:  1-2  tablet   Quantity:  15 tablet        Take 1-2 tablets by mouth every 4 hours as needed for moderate to severe pain   Refills:  0        LOSARTAN POTASSIUM PO   Dose:  25 mg        Take 25 mg by mouth   Refills:  0        metoprolol 25 MG tablet   Commonly known as:  LOPRESSOR   Dose:  25 mg   Quantity:  60 tablet        Take 1 tablet (25 mg) by mouth 2 times daily   Refills:  0        MINOCYCLINE HCL PO   Dose:  100 mg        Take 100 mg by mouth   Refills:  0        ranitidine 150 MG tablet   Commonly known as:  ZANTAC   Dose:  150 mg   Quantity:  30 tablet        Take 1 tablet (150 mg) by mouth 2 times daily   Refills:  0        TARCEVA 100 MG Tabs   Dose:  100 mg   Generic drug:  erlotinib        Take 100 mg by mouth every other day   Refills:  0        TYLENOL PO   Dose:  325-650 mg        Take 325-650 mg by mouth every 4 hours as needed for mild pain or fever   Refills:  0        zolpidem 5 MG tablet   Commonly known as:  AMBIEN   Dose:  5 mg        Take 5 mg by mouth nightly as needed for sleep   Refills:  0                Procedures and tests performed during your visit     Bladder scan    CBC with platelets differential    CT Abdomen Pelvis w Contrast    Comprehensive metabolic panel    Lipase    Lumbar spine CT w/o contrast    Lumbar spine MRI w/o contrast    UA with Microscopic reflex to Culture      Orders Needing Specimen Collection     None      Pending Results     No orders found from 9/29/2017 to 10/2/2017.            Pending Culture Results     No orders found from 9/29/2017 to 10/2/2017.            Pending Results Instructions     If you had any lab results that were not finalized at the time of your Discharge, you can call the ED Lab Result RN at [phone]. You will be contacted by this team for any positive Lab results or changes in treatment. The nurses are available 7 days a week from 10A to 6:30P.  You can leave a message 24 hours per day and they will return your call.        Test Results  From Your Hospital Stay        10/1/2017  9:57 AM      Component Results     Component Value Ref Range & Units Status    WBC 7.2 4.0 - 11.0 10e9/L Final    RBC Count 4.08 3.8 - 5.2 10e12/L Final    Hemoglobin 12.3 11.7 - 15.7 g/dL Final    Hematocrit 37.2 35.0 - 47.0 % Final    MCV 91 78 - 100 fl Final    MCH 30.1 26.5 - 33.0 pg Final    MCHC 33.1 31.5 - 36.5 g/dL Final    RDW 12.9 10.0 - 15.0 % Final    Platelet Count 166 150 - 450 10e9/L Final    Diff Method Automated Method  Final    % Neutrophils 72.5 % Final    % Lymphocytes 16.1 % Final    % Monocytes 9.0 % Final    % Eosinophils 1.5 % Final    % Basophils 0.3 % Final    % Immature Granulocytes 0.6 % Final    Nucleated RBCs 0 0 /100 Final    Absolute Neutrophil 5.2 1.6 - 8.3 10e9/L Final    Absolute Lymphocytes 1.2 0.8 - 5.3 10e9/L Final    Absolute Monocytes 0.7 0.0 - 1.3 10e9/L Final    Absolute Eosinophils 0.1 0.0 - 0.7 10e9/L Final    Absolute Basophils 0.0 0.0 - 0.2 10e9/L Final    Abs Immature Granulocytes 0.0 0 - 0.4 10e9/L Final    Absolute Nucleated RBC 0.0  Final         10/1/2017 10:15 AM      Component Results     Component Value Ref Range & Units Status    Sodium 139 133 - 144 mmol/L Final    Potassium 3.6 3.4 - 5.3 mmol/L Final    Chloride 104 94 - 109 mmol/L Final    Carbon Dioxide 30 20 - 32 mmol/L Final    Anion Gap 5 3 - 14 mmol/L Final    Glucose 96 70 - 99 mg/dL Final    Urea Nitrogen 15 7 - 30 mg/dL Final    Creatinine 0.61 0.52 - 1.04 mg/dL Final    GFR Estimate >90 >60 mL/min/1.7m2 Final    Non  GFR Calc    GFR Estimate If Black >90 >60 mL/min/1.7m2 Final    African American GFR Calc    Calcium 9.4 8.5 - 10.1 mg/dL Final    Bilirubin Total 0.6 0.2 - 1.3 mg/dL Final    Albumin 3.1 (L) 3.4 - 5.0 g/dL Final    Protein Total 7.7 6.8 - 8.8 g/dL Final    Alkaline Phosphatase 59 40 - 150 U/L Final    ALT 17 0 - 50 U/L Final    AST 16 0 - 45 U/L Final         10/1/2017 10:15 AM      Component Results     Component Value Ref Range  & Units Status    Lipase 135 73 - 393 U/L Final         10/1/2017 10:12 AM      Component Results     Component Value Ref Range & Units Status    Color Urine Yellow  Final    Appearance Urine Clear  Final    Glucose Urine Negative NEG^Negative mg/dL Final    Bilirubin Urine Negative NEG^Negative Final    Ketones Urine Negative NEG^Negative mg/dL Final    Specific Gravity Urine 1.006 1.003 - 1.035 Final    Blood Urine Negative NEG^Negative Final    pH Urine 7.0 5.0 - 7.0 pH Final    Protein Albumin Urine Negative NEG^Negative mg/dL Final    Urobilinogen mg/dL 0.0 0.0 - 2.0 mg/dL Final    Nitrite Urine Negative NEG^Negative Final    Leukocyte Esterase Urine Negative NEG^Negative Final    Source Catheterized Urine  Final    WBC Urine 1 0 - 2 /HPF Final    RBC Urine <1 0 - 2 /HPF Final    Squamous Epithelial /HPF Urine <1 0 - 1 /HPF Final         10/1/2017  2:00 PM      Narrative     CT ABDOMEN AND PELVIS WITH CONTRAST 10/1/2017 11:50 AM     HISTORY: Decreased colostomy output, back pain.    COMPARISON: 4/7/2017    TECHNIQUE: Volumetric helical acquisition of CT images from the lung  bases through the symphysis pubis after the administration of 60mL  Isovue-370  intravenous contrast. Radiation dose for this scan was  reduced using automated exposure control, adjustment of the mA and/or  kV according to patient size, or iterative reconstruction technique.    FINDINGS: There are no dilated loops of small intestine or large bowel  to suggest ileus or obstruction. Small parastomal hernia which is  nonobstructing. Bladder is somewhat distended, but otherwise  unremarkable. No hydronephrosis. Minimal adrenal thickening on the  left is stable. The liver, spleen, adrenal glands, kidneys, and  pancreas demonstrate no worrisome focal lesion. Gallbladder  unremarkable. No diverticulitis. There are moderate atherosclerotic  changes of the visualized aorta and its branches. There is no evidence  of aortic dissection or aneurysm.  Survey of the visualized bony  structures demonstrates no destructive bony lesions. Progressive  compression deformity at L2 assuming five lumbar type vertebral bodies  since the comparison study, better seen on lumbar spine CT same date.        Impression     IMPRESSION: No acute process demonstrated in the abdomen and pelvis.     MALLORY DOE MD         10/1/2017  1:15 PM      Narrative     CT OF THE LUMBAR SPINE WITHOUT CONTRAST  10/1/2017 11:51 AM     COMPARISON: Lumbar spine x-ray series 10/14/2016    HISTORY: New back pain, evaluate fracture, dislocation.     TECHNIQUE: Axial images of the lumbar spine were acquired without  intravenous contrast. Multiplanar reformations were created from the  axial source images.          Impression     IMPRESSION: The visualized bones are osteopenic. Chronic severe  compression deformity of L2 and chronic mild compression deformity of  L1 again noted. Vertebral body heights and contours of the lumbar  spine are otherwise normal. There is no evidence for new or recent  fracture of the lumbar spine. Alignment of the lumbar vertebrae  remains normal. There is moderate bony spinal canal narrowing at the  upper L2 level due to retropulsion of bony fragments from the  compression fracture. There is no other spinal canal narrowing of the  lumbar spine.      Radiation dose for this scan was reduced using automated exposure  control, adjustment of the mA and/or kV according to patient size, or  iterative reconstruction technique    RENAY VELIZ MD         10/1/2017  4:03 PM      Narrative     MRI OF THE LUMBAR SPINE WITHOUT CONTRAST 10/1/2017 3:05 PM     COMPARISON: Lumbar spine CT 10/1/2017, lumbar spine MRI 8/29/2011.    HISTORY: Worsening lumbar spine pain, urinary retention, evaluate for  cauda equina syndrome.    TECHNIQUE: Multiplanar, multisequence MRI images of the lumbar spine  were acquired without IV contrast.    FINDINGS: There are five lumbar-type vertebrae for the  purposes of  this dictation.     Severe chronic compression deformity of L2 and mild chronic  compression deformity of L1 again noted. Vertebral body heights of the  lumbar spine are otherwise normal. There is no evidence for recent  fracture lumbar spine. Although T11 is only partially visualized,  there is bone marrow edema within the T11 vertebral body raising the  question of a recent compression fracture of T11.    There is circumferential disc bulging of the L3-L4 and L4-L5  intervertebral discs. No other significant posterior disc bulges or  herniations are identified.    Tip of the conus medullaris is at the mid L1 level which is within  normal limits. There is no evidence for intrathecal abnormality.    Level by level:    T12-L1: There is no spinal canal or neural foraminal stenosis at this  level.    L1-L2: There is no spinal canal or neural foraminal stenosis at this  level.    L2-L3: There is mild left foraminal narrowing due to a circumferential  disc bulge. There is no spinal canal or right foraminal stenosis.    L3-L4: There is mild facet arthropathy bilaterally. There is no spinal  canal or neural foraminal stenosis at this level.    L4-L5: There is a circumferential disc bulge and moderate facet  arthropathy bilaterally. These findings result in mild left foraminal  narrowing but no spinal canal or right foraminal stenosis.    L5-S1: There is a circumferential disc bulge and moderate facet  arthropathy bilaterally. There is no spinal canal or neural foraminal  stenosis at this level.        Impression     IMPRESSION:  1. Chronic compression fractures of L1 and L2 again noted without  change.  2. Mild degenerative changes of the lumbar spine as described above.  3. Possible recent compression fracture of the partially visualized  T11 vertebral body.     RENAY VELIZ MD                Clinical Quality Measure: Blood Pressure Screening     Your blood pressure was checked while you were in the  "emergency department today. The last reading we obtained was  BP: 171/81 . Please read the guidelines below about what these numbers mean and what you should do about them.  If your systolic blood pressure (the top number) is less than 120 and your diastolic blood pressure (the bottom number) is less than 80, then your blood pressure is normal. There is nothing more that you need to do about it.  If your systolic blood pressure (the top number) is 120-139 or your diastolic blood pressure (the bottom number) is 80-89, your blood pressure may be higher than it should be. You should have your blood pressure rechecked within a year by a primary care provider.  If your systolic blood pressure (the top number) is 140 or greater or your diastolic blood pressure (the bottom number) is 90 or greater, you may have high blood pressure. High blood pressure is treatable, but if left untreated over time it can put you at risk for heart attack, stroke, or kidney failure. You should have your blood pressure rechecked by a primary care provider within the next 4 weeks.  If your provider in the emergency department today gave you specific instructions to follow-up with your doctor or provider even sooner than that, you should follow that instruction and not wait for up to 4 weeks for your follow-up visit.        Thank you for choosing [facility]       Thank you for choosing [facility] for your care. Our goal is always to provide you with excellent care. Hearing back from our patients is one way we can continue to improve our services. Please take a few minutes to complete the written survey that you may receive in the mail after you visit with us. Thank you!        [facility] Information     [employer] lets you send messages to your doctor, view your test results, renew your prescriptions, schedule appointments and more. To sign up, go to [email or web address] . Click on \"Log in\" on the left side of the screen, which will take you to the " "Welcome page. Then click on \"Sign up Now\" on the right side of the page.     You will be asked to enter the access code listed below, as well as some personal information. Please follow the directions to create your username and password.     Your access code is: [email or web address]  Expires: 2017  5:15 PM     Your access code will  in 90 days. If you need help or a new code, please call your [facility] clinic or [phone].        Care EveryWhere ID     This is your Care EveryWhere ID. This could be used by other organizations to access your [facility] medical records  [id number]        Equal Access to Services     RICH PRADO : Rafy Lu, shanae erazo, anna rachel, christian huffman. So [facility] [phone].    ATENCIÓN: Si habla español, tiene a owens disposición servicios gratuitos de asistencia lingüística. Llame al [phone].    We comply with applicable federal civil rights laws and [place] laws. We do not discriminate on the basis of race, color, national origin, age, disability, sex, sexual orientation, or gender identity.            After Visit Summary       This is your record. Keep this with you and show to your community pharmacist(s) and doctor(s) at your next visit.                  "

## 2017-10-01 NOTE — ED NOTES
Pt arrives with back pain intermittent over past couple yrs, pain restarted Friday morning pt sen in clinic advised to take ibuprofen, pt has lumbar pain, pt has been ambulatory but needed more assistance, pt has back brace on from injury 2 yrs ago. Took  norco 5/325mg in triage from own med supply. She has not been taking that daughter wasn't sure if she could have it with the ibuprofen. Last dose ibuprofen 0700. Daughter translating per pt request.

## 2017-10-01 NOTE — ED AVS SNAPSHOT
[facility] Emergency Department    [address]    [facility] [address]    Phone:  [phone]    Fax:  [phone]                                       Won Huggins   MRN: [id number]    Department:  [facility] Emergency Department   Date of Visit:  10/1/2017           After Visit Summary Signature Page     I have received my discharge instructions, and my questions have been answered. I have discussed any challenges I see with this plan with the nurse or doctor.    ..........................................................................................................................................  Patient/Patient Representative Signature      ..........................................................................................................................................  Patient Representative Print Name and Relationship to Patient    ..................................................               ................................................  Date                                            Time    ..........................................................................................................................................  Reviewed by Signature/Title    ...................................................              ..............................................  Date                                                            Time

## 2017-10-01 NOTE — ED NOTES
Ambulated pt to bathroom and back to room with walker. Pt was able to ambulate slowly without staff assistance. Pt stated that her back hurt consistently when walking but was only really bad when transitioning too sitting or laying down.

## 2017-10-01 NOTE — ED NOTES
Patient reports moderate improvement in pain. Alert and oriented. Encouraged follow up for continued pain management. MD in to see patient and discuss diagnosis, test results, and discharge plan. Patient meets discharge criteria. Discussed AVS with patient and family. Questions answered. They verbalized understanding. They reports being ready to go home. Patient to discharge home with family by car with all necessary information once dressed.
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
## 2017-10-03 ENCOUNTER — NURSE TRIAGE (OUTPATIENT)
Dept: NURSING | Facility: CLINIC | Age: 77
End: 2017-10-03

## 2017-10-03 NOTE — TELEPHONE ENCOUNTER
Daughter calling for instructions on what to do if pt's back pain persists.  Consent to communicate given from pt to speak with her daughter.  Pt was seen in the ED on 10/1/17, notes state to return to the ED if symptoms worsen.   Advised daughter to bring pt back to the ED tonight if the symptoms are worsening and pt cannot wait until tomorrow for a clinic apt.  Unsure what they will do.    Tesha Etienne RN/FNA

## 2017-11-02 ENCOUNTER — HOSPITAL ENCOUNTER (OUTPATIENT)
Facility: CLINIC | Age: 77
Setting detail: OBSERVATION
Discharge: HOME | End: 2017-11-03
Attending: EMERGENCY MEDICINE | Admitting: INTERNAL MEDICINE
Payer: COMMERCIAL

## 2017-11-02 ENCOUNTER — APPOINTMENT (OUTPATIENT)
Dept: CARDIOLOGY | Facility: CLINIC | Age: 77
End: 2017-11-02
Attending: INTERNAL MEDICINE
Payer: COMMERCIAL

## 2017-11-02 ENCOUNTER — APPOINTMENT (OUTPATIENT)
Dept: CT IMAGING | Facility: CLINIC | Age: 77
End: 2017-11-02
Attending: EMERGENCY MEDICINE
Payer: COMMERCIAL

## 2017-11-02 DIAGNOSIS — R07.1 CHEST PAIN ON BREATHING: Primary | ICD-10-CM

## 2017-11-02 DIAGNOSIS — I21.4 NSTEMI (NON-ST ELEVATED MYOCARDIAL INFARCTION) (H): ICD-10-CM

## 2017-11-02 PROBLEM — R07.89 ATYPICAL CHEST PAIN: Status: ACTIVE | Noted: 2017-11-02

## 2017-11-02 LAB
ALBUMIN SERPL-MCNC: 2.7 G/DL (ref 3.4–5)
ALP SERPL-CCNC: 90 U/L (ref 40–150)
ALT SERPL W P-5'-P-CCNC: 23 U/L (ref 0–50)
ANION GAP SERPL CALCULATED.3IONS-SCNC: 5 MMOL/L (ref 3–14)
APTT PPP: 26 SEC (ref 22–37)
AST SERPL W P-5'-P-CCNC: 56 U/L (ref 0–45)
BASOPHILS # BLD AUTO: 0 10E9/L (ref 0–0.2)
BASOPHILS NFR BLD AUTO: 0.2 %
BILIRUB SERPL-MCNC: 0.7 MG/DL (ref 0.2–1.3)
BUN SERPL-MCNC: 17 MG/DL (ref 7–30)
CALCIUM SERPL-MCNC: 8.2 MG/DL (ref 8.5–10.1)
CHLORIDE SERPL-SCNC: 102 MMOL/L (ref 94–109)
CK SERPL-CCNC: 53 U/L (ref 30–225)
CO2 SERPL-SCNC: 29 MMOL/L (ref 20–32)
CREAT SERPL-MCNC: 0.55 MG/DL (ref 0.52–1.04)
D DIMER PPP FEU-MCNC: 1.2 UG/ML FEU (ref 0–0.5)
DIFFERENTIAL METHOD BLD: ABNORMAL
EOSINOPHIL # BLD AUTO: 0.1 10E9/L (ref 0–0.7)
EOSINOPHIL NFR BLD AUTO: 0.6 %
ERYTHROCYTE [DISTWIDTH] IN BLOOD BY AUTOMATED COUNT: 14.6 % (ref 10–15)
GFR SERPL CREATININE-BSD FRML MDRD: >90 ML/MIN/1.7M2
GLUCOSE SERPL-MCNC: 108 MG/DL (ref 70–99)
HCT VFR BLD AUTO: 38.3 % (ref 35–47)
HGB BLD-MCNC: 12.5 G/DL (ref 11.7–15.7)
IMM GRANULOCYTES # BLD: 0.1 10E9/L (ref 0–0.4)
IMM GRANULOCYTES NFR BLD: 0.4 %
INR PPP: 0.85 (ref 0.86–1.14)
INTERPRETATION ECG - MUSE: NORMAL
INTERPRETATION ECG - MUSE: NORMAL
LIPASE SERPL-CCNC: 433 U/L (ref 73–393)
LYMPHOCYTES # BLD AUTO: 1.7 10E9/L (ref 0.8–5.3)
LYMPHOCYTES NFR BLD AUTO: 12.7 %
MCH RBC QN AUTO: 31.1 PG (ref 26.5–33)
MCHC RBC AUTO-ENTMCNC: 32.6 G/DL (ref 31.5–36.5)
MCV RBC AUTO: 95 FL (ref 78–100)
MONOCYTES # BLD AUTO: 0.8 10E9/L (ref 0–1.3)
MONOCYTES NFR BLD AUTO: 5.9 %
NEUTROPHILS # BLD AUTO: 10.5 10E9/L (ref 1.6–8.3)
NEUTROPHILS NFR BLD AUTO: 80.2 %
NRBC # BLD AUTO: 0 10*3/UL
NRBC BLD AUTO-RTO: 0 /100
NT-PROBNP SERPL-MCNC: 167 PG/ML (ref 0–1800)
PLATELET # BLD AUTO: 184 10E9/L (ref 150–450)
POTASSIUM SERPL-SCNC: 4.8 MMOL/L (ref 3.4–5.3)
PROT SERPL-MCNC: 7.6 G/DL (ref 6.8–8.8)
RBC # BLD AUTO: 4.02 10E12/L (ref 3.8–5.2)
SODIUM SERPL-SCNC: 136 MMOL/L (ref 133–144)
TROPONIN I SERPL-MCNC: 0.07 UG/L (ref 0–0.04)
WBC # BLD AUTO: 13.1 10E9/L (ref 4–11)

## 2017-11-02 PROCEDURE — 93005 ELECTROCARDIOGRAM TRACING: CPT | Mod: 76

## 2017-11-02 PROCEDURE — G0378 HOSPITAL OBSERVATION PER HR: HCPCS

## 2017-11-02 PROCEDURE — 25000132 ZZH RX MED GY IP 250 OP 250 PS 637: Performed by: INTERNAL MEDICINE

## 2017-11-02 PROCEDURE — 84484 ASSAY OF TROPONIN QUANT: CPT | Performed by: EMERGENCY MEDICINE

## 2017-11-02 PROCEDURE — 99218 ZZC INITIAL OBSERVATION CARE,LEVL I: CPT | Performed by: INTERNAL MEDICINE

## 2017-11-02 PROCEDURE — 83690 ASSAY OF LIPASE: CPT | Performed by: EMERGENCY MEDICINE

## 2017-11-02 PROCEDURE — 99207 ZZC CDG-CODE CATEGORY CHANGED: CPT | Performed by: HOSPITALIST

## 2017-11-02 PROCEDURE — 25000132 ZZH RX MED GY IP 250 OP 250 PS 637: Performed by: EMERGENCY MEDICINE

## 2017-11-02 PROCEDURE — 99207 ZZC CDG-HISTORY COMP: MEETS EXP. PROB FOCUSED- DOWN CODED LACK OF PFSH: CPT | Performed by: INTERNAL MEDICINE

## 2017-11-02 PROCEDURE — 84484 ASSAY OF TROPONIN QUANT: CPT | Mod: 91 | Performed by: INTERNAL MEDICINE

## 2017-11-02 PROCEDURE — 94640 AIRWAY INHALATION TREATMENT: CPT

## 2017-11-02 PROCEDURE — 83880 ASSAY OF NATRIURETIC PEPTIDE: CPT | Performed by: EMERGENCY MEDICINE

## 2017-11-02 PROCEDURE — 25000125 ZZHC RX 250: Performed by: EMERGENCY MEDICINE

## 2017-11-02 PROCEDURE — 93005 ELECTROCARDIOGRAM TRACING: CPT

## 2017-11-02 PROCEDURE — 96365 THER/PROPH/DIAG IV INF INIT: CPT

## 2017-11-02 PROCEDURE — 93306 TTE W/DOPPLER COMPLETE: CPT

## 2017-11-02 PROCEDURE — 36415 COLL VENOUS BLD VENIPUNCTURE: CPT | Performed by: INTERNAL MEDICINE

## 2017-11-02 PROCEDURE — 85025 COMPLETE CBC W/AUTO DIFF WBC: CPT | Performed by: EMERGENCY MEDICINE

## 2017-11-02 PROCEDURE — 96375 TX/PRO/DX INJ NEW DRUG ADDON: CPT

## 2017-11-02 PROCEDURE — 85730 THROMBOPLASTIN TIME PARTIAL: CPT | Performed by: EMERGENCY MEDICINE

## 2017-11-02 PROCEDURE — 80053 COMPREHEN METABOLIC PANEL: CPT | Performed by: EMERGENCY MEDICINE

## 2017-11-02 PROCEDURE — 82550 ASSAY OF CK (CPK): CPT | Performed by: INTERNAL MEDICINE

## 2017-11-02 PROCEDURE — 71260 CT THORAX DX C+: CPT

## 2017-11-02 PROCEDURE — 99285 EMERGENCY DEPT VISIT HI MDM: CPT | Mod: 25

## 2017-11-02 PROCEDURE — 85610 PROTHROMBIN TIME: CPT | Performed by: EMERGENCY MEDICINE

## 2017-11-02 PROCEDURE — 25000128 H RX IP 250 OP 636: Performed by: EMERGENCY MEDICINE

## 2017-11-02 PROCEDURE — 93306 TTE W/DOPPLER COMPLETE: CPT | Mod: 26 | Performed by: INTERNAL MEDICINE

## 2017-11-02 PROCEDURE — 85379 FIBRIN DEGRADATION QUANT: CPT | Performed by: EMERGENCY MEDICINE

## 2017-11-02 PROCEDURE — 25000132 ZZH RX MED GY IP 250 OP 250 PS 637: Performed by: HOSPITALIST

## 2017-11-02 PROCEDURE — 99221 1ST HOSP IP/OBS SF/LOW 40: CPT | Mod: 25 | Performed by: INTERNAL MEDICINE

## 2017-11-02 PROCEDURE — 99207 ZZC CDG-CODE CATEGORY CHANGED: CPT | Performed by: INTERNAL MEDICINE

## 2017-11-02 RX ORDER — HYDROCODONE BITARTRATE AND ACETAMINOPHEN 5; 325 MG/1; MG/1
1-2 TABLET ORAL EVERY 4 HOURS PRN
Status: DISCONTINUED | OUTPATIENT
Start: 2017-11-02 | End: 2017-11-03 | Stop reason: HOSPADM

## 2017-11-02 RX ORDER — ALUMINA, MAGNESIA, AND SIMETHICONE 2400; 2400; 240 MG/30ML; MG/30ML; MG/30ML
15 SUSPENSION ORAL ONCE
Status: COMPLETED | OUTPATIENT
Start: 2017-11-02 | End: 2017-11-02

## 2017-11-02 RX ORDER — ASPIRIN 81 MG/1
324 TABLET, CHEWABLE ORAL ONCE
Status: COMPLETED | OUTPATIENT
Start: 2017-11-02 | End: 2017-11-02

## 2017-11-02 RX ORDER — METOPROLOL TARTRATE 25 MG/1
25 TABLET, FILM COATED ORAL 2 TIMES DAILY
Status: DISCONTINUED | OUTPATIENT
Start: 2017-11-02 | End: 2017-11-02

## 2017-11-02 RX ORDER — MORPHINE SULFATE 4 MG/ML
2 INJECTION, SOLUTION INTRAMUSCULAR; INTRAVENOUS ONCE
Status: COMPLETED | OUTPATIENT
Start: 2017-11-02 | End: 2017-11-02

## 2017-11-02 RX ORDER — NITROGLYCERIN 0.4 MG/1
0.4 TABLET SUBLINGUAL EVERY 5 MIN PRN
Status: DISCONTINUED | OUTPATIENT
Start: 2017-11-02 | End: 2017-11-02

## 2017-11-02 RX ORDER — IOPAMIDOL 755 MG/ML
500 INJECTION, SOLUTION INTRAVASCULAR ONCE
Status: COMPLETED | OUTPATIENT
Start: 2017-11-02 | End: 2017-11-02

## 2017-11-02 RX ORDER — ONDANSETRON 2 MG/ML
4 INJECTION INTRAMUSCULAR; INTRAVENOUS EVERY 6 HOURS PRN
Status: DISCONTINUED | OUTPATIENT
Start: 2017-11-02 | End: 2017-11-03 | Stop reason: HOSPADM

## 2017-11-02 RX ORDER — ATORVASTATIN CALCIUM 10 MG/1
10 TABLET, FILM COATED ORAL DAILY
Status: DISCONTINUED | OUTPATIENT
Start: 2017-11-02 | End: 2017-11-03 | Stop reason: HOSPADM

## 2017-11-02 RX ORDER — ONDANSETRON 4 MG/1
4 TABLET, ORALLY DISINTEGRATING ORAL EVERY 6 HOURS PRN
Status: DISCONTINUED | OUTPATIENT
Start: 2017-11-02 | End: 2017-11-03 | Stop reason: HOSPADM

## 2017-11-02 RX ORDER — NALOXONE HYDROCHLORIDE 0.4 MG/ML
.1-.4 INJECTION, SOLUTION INTRAMUSCULAR; INTRAVENOUS; SUBCUTANEOUS
Status: DISCONTINUED | OUTPATIENT
Start: 2017-11-02 | End: 2017-11-03 | Stop reason: HOSPADM

## 2017-11-02 RX ORDER — LORAZEPAM 1 MG/1
1 TABLET ORAL ONCE
Status: COMPLETED | OUTPATIENT
Start: 2017-11-02 | End: 2017-11-02

## 2017-11-02 RX ORDER — ALBUTEROL SULFATE 0.83 MG/ML
2.5 SOLUTION RESPIRATORY (INHALATION) EVERY 6 HOURS PRN
Status: DISCONTINUED | OUTPATIENT
Start: 2017-11-02 | End: 2017-11-02

## 2017-11-02 RX ORDER — ASPIRIN 81 MG/1
81 TABLET ORAL DAILY
Status: DISCONTINUED | OUTPATIENT
Start: 2017-11-02 | End: 2017-11-03 | Stop reason: HOSPADM

## 2017-11-02 RX ORDER — ACETAMINOPHEN 325 MG/1
650 TABLET ORAL EVERY 4 HOURS PRN
Status: DISCONTINUED | OUTPATIENT
Start: 2017-11-02 | End: 2017-11-03 | Stop reason: HOSPADM

## 2017-11-02 RX ORDER — HYDROMORPHONE HYDROCHLORIDE 2 MG/1
2-4 TABLET ORAL EVERY 4 HOURS PRN
Status: DISCONTINUED | OUTPATIENT
Start: 2017-11-02 | End: 2017-11-03 | Stop reason: HOSPADM

## 2017-11-02 RX ADMIN — SODIUM CHLORIDE 73 ML: 9 INJECTION, SOLUTION INTRAVENOUS at 04:51

## 2017-11-02 RX ADMIN — HEPARIN SODIUM 500 UNITS/HR: 10000 INJECTION, SOLUTION INTRAVENOUS at 06:12

## 2017-11-02 RX ADMIN — ATORVASTATIN CALCIUM 10 MG: 10 TABLET, FILM COATED ORAL at 14:21

## 2017-11-02 RX ADMIN — ALUMINUM HYDROXIDE, MAGNESIUM HYDROXIDE, AND DIMETHICONE 15 ML: 400; 400; 40 SUSPENSION ORAL at 04:11

## 2017-11-02 RX ADMIN — ASPIRIN 81 MG: 81 TABLET, COATED ORAL at 14:19

## 2017-11-02 RX ADMIN — NITROGLYCERIN 0.4 MG: 0.4 TABLET SUBLINGUAL at 05:04

## 2017-11-02 RX ADMIN — MORPHINE SULFATE 2 MG: 4 INJECTION, SOLUTION INTRAMUSCULAR; INTRAVENOUS at 06:10

## 2017-11-02 RX ADMIN — DICLOFENAC SODIUM 2 G: 10 GEL TOPICAL at 13:05

## 2017-11-02 RX ADMIN — ACETAMINOPHEN 650 MG: 325 TABLET, FILM COATED ORAL at 13:10

## 2017-11-02 RX ADMIN — NITROGLYCERIN 0.4 MG: 0.4 TABLET SUBLINGUAL at 05:20

## 2017-11-02 RX ADMIN — LORAZEPAM 1 MG: 1 TABLET ORAL at 04:10

## 2017-11-02 RX ADMIN — ASPIRIN 81 MG 324 MG: 81 TABLET ORAL at 04:11

## 2017-11-02 RX ADMIN — HYDROCODONE BITARTRATE AND ACETAMINOPHEN 2 TABLET: 5; 325 TABLET ORAL at 14:21

## 2017-11-02 RX ADMIN — DICLOFENAC SODIUM 2 G: 10 GEL TOPICAL at 17:21

## 2017-11-02 RX ADMIN — ALBUTEROL SULFATE 2.5 MG: 2.5 SOLUTION RESPIRATORY (INHALATION) at 04:18

## 2017-11-02 RX ADMIN — Medication 2400 UNITS: at 06:12

## 2017-11-02 RX ADMIN — MENTHOL 1 PATCH: 205.5 PATCH TOPICAL at 13:05

## 2017-11-02 RX ADMIN — LIDOCAINE HYDROCHLORIDE 15 ML: 20 SOLUTION ORAL; TOPICAL at 04:12

## 2017-11-02 RX ADMIN — IOPAMIDOL 56 ML: 755 INJECTION, SOLUTION INTRAVENOUS at 04:44

## 2017-11-02 RX ADMIN — HYDROCODONE BITARTRATE AND ACETAMINOPHEN 2 TABLET: 5; 325 TABLET ORAL at 20:51

## 2017-11-02 ASSESSMENT — ACTIVITIES OF DAILY LIVING (ADL)
ADLS_ACUITY_SCORE: 12
ADLS_ACUITY_SCORE: 12

## 2017-11-02 ASSESSMENT — ENCOUNTER SYMPTOMS
FEVER: 0
COUGH: 0
SHORTNESS OF BREATH: 1

## 2017-11-02 NOTE — PROGRESS NOTES
Assuming care.  I have seen this patient and interviewed her with help of her daughter (no  available at the moment of my visit).  She has chest pain in the anterior wall elicited with deep breathing, movements and on palpation.  No associated symptoms.  EKG is normal.  Troponins mild elevation making a flat line similar to prior workup.  Echocardiogram without acute abnormalities unchanged in comparison with the last one.  Cardiology consultation completed.    Assessment.  1.  Atypical chest pain, very suggestive for musculoskeletal etiology.   I will continue treating pain using local Voltaren cream and I would add menthol patches.    Patient and family has been reassured.  I agree with my partner Anita Njaera MD.

## 2017-11-02 NOTE — IP AVS SNAPSHOT
[place] [id number] [facility]    [address]    [facility] [address]    Phone:  [phone]    Fax:  [phone]                                       After Visit Summary   11/2/2017    Won Huggins    MRN: [id number]           After Visit Summary Signature Page     I have received my discharge instructions, and my questions have been answered. I have discussed any challenges I see with this plan with the nurse or doctor.    ..........................................................................................................................................  Patient/Patient Representative Signature      ..........................................................................................................................................  Patient Representative Print Name and Relationship to Patient    ..................................................               ................................................  Date                                            Time    ..........................................................................................................................................  Reviewed by Signature/Title    ...................................................              ..............................................  Date                                                            Time

## 2017-11-02 NOTE — H&P
CHIEF COMPLAINT:  Chest pain.      HISTORY OF PRESENT ILLNESS:  This is obtained from Te Huggins and her daughter who was in attendance.  The patient mainly speaks Slovenian, and hence her daughter is interpreting.  This as a 77-year-old [place] female with a history of rectal cancer, status post colostomy, hypertension, lung adenocarcinoma in remission, osteoporosis, recent compression fracture of her L-spine, and chronic lymphedema of the left lower extremity.  The patient was in her usual state of health until roughly 11:00 p.m. when she started having pain in her chest region.  It is below her manubrium.  The pain only occurs when she takes in a deep breath.  It is not worse with movement.  She is unable to describe the character of the pain.  She denies any fevers, chills, nausea, vomiting.  She has a mild cough which is nonproductive and chronic, per patient.  She denies any shortness of breath.  In the ER over here, she was seen by Dr. Ceferino Prasad.  She was noted to have an elevated troponin.  There was concern about non-ST elevation MI; hence, she has been initiated her on intravenous heparin.  She did have a CT PE protocol, which showed no PE or acute abnormality.  I am asked to admit her for further evaluation.  We did discuss her case.      PAST MEDICAL HISTORY:  Significant for allergic rhinitis, prior history of pleural effusion related to lung cancer, rectal cancer, status post colostomy in remission, prior history of PE, chronic lymphedema of the left lower extremity, essential hypertension, osteoporosis, hepatitis C.      PAST SURGICAL HISTORY:  Significant for hysterectomy, carpal tunnel release, lung cancer to include right lower lobectomy and lymphadenectomy, colon  surgery, colostomy for anal cancer, open reduction internal fixation of her left wrist, open reduction internal fixation of her left hip, bilateral cataract surgery.      FAMILY HISTORY:  Reviewed and noncontributory.      ALLERGIES:  No  known drug allergies.      MEDICATIONS:  List includes:   1.  Tarceva.   2.  Lopressor.   3.  Hydrochlorothiazide.   4.  Aspirin.   5.  Tylenol.   6.  Losartan.   7.  Zyrtec.   8.  Robitussin.    9.  Tramadol.      REVIEW OF SYSTEMS:  As mentioned in the HPI.  All other systems are extensively reviewed and deemed unremarkable and negative.      PHYSICAL EXAMINATION:   VITAL SIGNS:  Temperature is 98.4, pulse 92, blood pressure is 104/63, respiratory rate 22, O2 sat is 92% on room air.   GENERAL:  She is alert, awake, oriented, coherent, in mild distress when she takes in a deep breath.   HEENT:  Pupils equal, round, react to light.  Pharynx:  She is edentulous on the upper, has quite a few missing teeth on the lower jaw.   LUNGS:  Clear to auscultation bilaterally.   HEART:  Regular rate, S1, S2 normal.  No murmurs or gallops.   ABDOMEN:  Soft, nontender, nondistended, with good bowel sounds.  She has a colostomy which is empty at present.   EXTREMITIES:  She has 2+ edema on the left lower extremity.   SKIN:  There is no rash.      LABORATORY:  Labwork obtained here included a CMP which is grossly unremarkable other than a calcium of 8.2, albumin 2.7, AST of 56.  Her lipase is 433.  BNP of 167.  Her troponin is 0.070.  On a CBC with diff, her white cell count is 13.1, hemoglobin 12.5, hematocrit 38.3, platelet count of 184, absolute neutrophil count of 10.5.  Her INR is 0.85.  D-dimer was 1.2.  CT PE protocol carried out here showed no pulmonary embolus, aortic aneurysm or dissection, no acute abnormality.  She had 2 EKGs carried out over here.  Her initial EKG shows normal sinus rhythm at 71 beats per minute with left axis deviation.  The second EKG is similar at 82 beats per minute.  There are no significant ST-T wave changes to indicate ischemia.      ASSESSMENT AND PLAN:   1.  Chest pain, etiology undetermined, but she does have an elevated troponin with NSTEMI.  We will trend out her troponins.  She has already  had an aspirin and has been initiated on IV heparin.  I will continue that.  It seems atypical for cardiac.  We will get an echocardiogram.  We will consult Cardiology.   2.  Hypertension.  Resume her home medications once reconciled.   3.  Elevated AST.  Would monitor for now.   4.  H/o of rectal and lung adenoca. In remission.     CODE STATUS:  Full code.      She will be admitted as an inpatient.         ALTHEA WHITTEN MD             D: 2017 06:09   T: 2017 06:27   MT: PEPE      Name:     MERRILL HENLEY   MRN:      [id number]        Account:      [id number]   :      1940           Admitted:     [id number]      Document: [id number]

## 2017-11-02 NOTE — ED NOTES
Pt with difficulty breathing since 11pm, pain in chest as well, worse with inspiration, denies nausea

## 2017-11-02 NOTE — UTILIZATION REVIEW
"[facility]   Admission Status; Secondary Review Determination     Admission Date: 11/2/2017  4:00 AM      Under the authority of the Utilization Management Committee, the utilization review process indicated a secondary review on the above patient.  The review outcome is based on review of the medical records, discussions with staff, and applying clinical experience noted on the date of the review.        ()      Inpatient Status Appropriate - This patient's medical care is consistent with medical management for inpatient care and reasonable inpatient medical practice.      (X) Observation Status Appropriate - This patient does not meet hospital inpatient criteria and is placed in observation status. If this patient's primary payer is Medicare and was admitted as an inpatient, Condition Code 44 should be used and patient status changed to \"observation\".   () Admission Status NOT Appropriate - This patient's medical care is not consistent with medical management for Inpatient or Observation Status.          RATIONALE FOR DETERMINATION   78 yo female with a history of CVA, rectal cancer, HTN, lung cancer and lumbar compression fracture who presented to the ER with pleuritic chest pain and was found to have an elevated troponin and was admitted for concern of NSTEMI. Cardiology consulted and they note a recent normal stress test, TTE this admission without evidence for ACS and cardiology recommending evaluation for non-cardiac etiology of chest pain. She was admitted under inpatient status, but given above is more appropriate for observation. I have paged Dr. Flores to discuss and request an obs order.     The severity of illness, intensity of service provided, expected LOS and risk for adverse outcome make the care appropriate for further observation.    The information on this document is developed by the utilization review team in order for the business office to ensure compliance.  This only denotes " the appropriateness of proper admission status and does not reflect the quality of care rendered.         The definitions of Inpatient Status and Observation Status used in making the determination above are those provided in the CMS Coverage Manual, Chapter 1 and Chapter 6, section 70.4.      Sincerely,     Jaqui rTan MD MPH  Utilization Review  [facility].

## 2017-11-02 NOTE — PLAN OF CARE
Problem: Cardiac: ACS (Acute Coronary Syndrome) (Adult)  Goal: Signs and Symptoms of Listed Potential Problems Will be Absent, Minimized or Managed (Cardiac: ACS)  Signs and symptoms of listed potential problems will be absent, minimized or managed by discharge/transition of care (reference Cardiac: ACS (Acute Coronary Syndrome) (Adult) CPG).  Outcome: Improving  Vss. A&O x4. Hebrew speaking. Daughter interpreting at bedside.awaiting . Severe pain with deep inspiration or any movement. Hep gtt stopped  Tylenol and icy hot with some relief. Norco given. Pending post pain assessment. Medical manage per cards. Continent. lbm yesterday.   Trop trending down

## 2017-11-02 NOTE — CONSULTS
[facility]    Cardiology Consultation     Date of Admission:  11/2/2017    Assessment & Plan     The patient is a pleasant 77-year-old female with a prior history of CVA, colon cancer, lung cancer, coronary artery disease diagnosed on a CT scan showing coronary artery calcifications who is admitted with atypical noncardiac chest pain.  She has previously undergone a comprehensive cardiovascular evaluation earlier this year during an admission with a nearly identical presentation (please see my previous consultation note).  This showed a normal Lexiscan nuclear stress test with no evidence for myocardial ischemia.  Her transthoracic echocardiogram during this admission is normal and unchanged from the prior admission.  During her last hospitalization her troponins were mildly elevated consistent with her most recent admission.  At this time I would recommend an evaluation for noncardiac causes of chest pain while trending the troponin one more time to guarantee it does not rise further.  So long as it does not I would recommend continued medical management rather than a cardiac catheterization..  For her underlying coronary artery calcifications I have reinitiated low-dose atorvastatin.  I'm not using a higher dose of atorvastatin due to her elevated LFTs.  She should also remain on aspirin 81 mg daily unchanged.    Reginaldo Toro MD    Primary Care Physician   Kayla Delgadillo    Reason for Consult   Reason for consult: I was asked by internal medicine to evaluate this patient for elevated troponin.    History of Present Illness   Won Huggins is a 77 year old female who presents with pleuritic chest pain.  The patient is seen with the help of her daughter who provided interpretation.  The daughter and patient did not want a formal .    The patient was in her typical state of health until 24 hours ago when she had the sudden onset of significant chest pain with inspiration.  The patient has had a  nearly identical admission earlier this year to [facility].  I would direct you to my consultation note and eventual workup with a negative Lexiscan nuclear stress test and transthoracic echocardiogram.    This admission the patient has undergone a CT scan of her chest showing no evidence for a pulmonary embolism, aortic aneurysm, or aortic dissection.  No acute abnormalities were seen.    The patient's initial troponin was mildly elevated at 0.066 but no subsequent troponin has been drawn.  Her N-terminal proBNP is normal at 167.    She denies any other cardiovascular symptoms such as chest pain with exertion, chest heaviness, shortness of breath, dyspnea with exertion, orthopnea, paroxysmal nocturnal dyspnea, or palpitations.    Patient Active Problem List   Diagnosis     Chest pain     Tibial fracture       Past Medical History   I have reviewed this patient's medical history and updated it with pertinent information if needed.   Past Medical History:   Diagnosis Date     Cancer (H)      CVA (cerebral infarction)      Hypertension        Past Surgical History   I have reviewed this patient's surgical history and updated it with pertinent information if needed.  Past Surgical History:   Procedure Laterality Date     BIOPSY       LOBECTOMY LUNG         Prior to Admission Medications   Prior to Admission Medications   Prescriptions Last Dose Informant Patient Reported? Taking?   ASPIRIN EC PO 11/1/2017 at Unknown time  Yes Yes   Sig: Take 81 mg by mouth daily   Acetaminophen (TYLENOL PO)   Yes Yes   Sig: Take 325-650 mg by mouth every 4 hours as needed for mild pain or fever   Ginsengs-Royal Jelly-Vit B12 (GINSENG ROYAL JELLY PLUS PO)   Yes Yes   Sig: Take by mouth as needed Chinese OTC med   HYDROCHLOROTHIAZIDE PO 11/1/2017 at Unknown time  Yes Yes   Sig: Take 12.5 mg by mouth daily    LOSARTAN POTASSIUM PO 11/1/2017 at Unknown time  Yes Yes   Sig: Take 25 mg by mouth daily    MINOCYCLINE HCL PO  11/1/2017 at Unknown time  Yes Yes   Sig: Take 100 mg by mouth daily    NONFORMULARY   Yes Yes   Sig: Take by mouth as needed Med from [place] prn sleep - Lexomil(bromazepam) 6mg   VITAMIN D, CHOLECALCIFEROL, PO 11/1/2017 at Unknown time  Yes Yes   Sig: Take by mouth daily Unknown OTC medication for bone health   albuterol (PROAIR HFA/PROVENTIL HFA/VENTOLIN HFA) 108 (90 BASE) MCG/ACT Inhaler   Yes Yes   Sig: Inhale 1-2 puffs into the lungs every 4 hours as needed for shortness of breath / dyspnea or wheezing   cetirizine (ZYRTEC) 10 MG tablet 11/1/2017 at Unknown time  Yes Yes   Sig: Take 10 mg by mouth daily as needed    erlotinib (TARCEVA) 100 MG TABS 11/1/2017 at Unknown time Daughter Yes Yes   Sig: Take 100 mg by mouth every other day    fish oil-omega-3 fatty acids 1000 MG capsule 11/1/2017 at Unknown time  Yes Yes   Sig: Take 1 g by mouth daily   fluticasone (FLONASE) 50 MCG/ACT spray   Yes Yes   Sig: Spray 2 sprays into both nostrils daily as needed for rhinitis or allergies   zolpidem (AMBIEN) 5 MG tablet   Yes No   Sig: Take 5 mg by mouth nightly as needed for sleep      Facility-Administered Medications: None     Current Facility-Administered Medications   Medication Dose Route Frequency     aspirin  81 mg Oral Daily     metoprolol  25 mg Oral BID     atorvastatin  10 mg Oral Daily     Current Facility-Administered Medications   Medication Last Rate     - MEDICATION INSTRUCTIONS -       Allergies   No Known Allergies    Social History    reports that she has never smoked. She does not have any smokeless tobacco history on file. She reports that she does not drink alcohol.    Family History   No family history on file.    Review of Systems   The comprehensive 10 point Review of Systems is negative other than noted in the HPI or here.     Physical Exam   Vital Signs with Ranges  Temp:  [98.4  F (36.9  C)] 98.4  F (36.9  C)  Pulse:  [74] 74  Heart Rate:  [81-94] 81  Resp:  [20-22] 20  BP: (104-151)/(59-88)  "135/67  SpO2:  [91 %-100 %] 98 %  Wt Readings from Last 4 Encounters:   11/02/17 55.5 kg (122 lb 4.8 oz)   10/01/17 54.4 kg (120 lb)   04/07/17 55.4 kg (122 lb 3.2 oz)   09/03/15 55.3 kg (122 lb)            Vitals: /67 (BP Location: Left arm)  Pulse 74  Temp 98.4  F (36.9  C) (Oral)  Resp 20  Ht 1.448 m (4' 9\")  Wt 55.5 kg (122 lb 4.8 oz)  SpO2 98%  BMI 26.47 kg/m2    The patient appears to be in pain when she takes in a deep breath  No scleral icterus  Normocephalic  Heart is regular and without a significant murmur  Lungs are clear  Abdomen is soft and nontender  No lower extremity edema  She is alert and oriented  Affect is normal  Musculoskeletal exam does not reveal any gross abnormalities of her major joints  Dermatologic exam does not really any rashes on exposed areas of skin      Recent Labs  Lab 11/02/17  0730 11/02/17  0400   TROPI 0.066* 0.070*         Recent Labs  Lab 11/02/17  0730 11/02/17  0400   WBC  --  13.1*   HGB  --  12.5   MCV  --  95   PLT  --  184   INR  --  0.85*   NA  --  136   POTASSIUM  --  4.8   CHLORIDE  --  102   CO2  --  29   BUN  --  17   CR  --  0.55   GFRESTIMATED  --  >90   GFRESTBLACK  --  >90   ANIONGAP  --  5   STEPHEN  --  8.2*   GLC  --  108*   ALBUMIN  --  2.7*   PROTTOTAL  --  7.6   BILITOTAL  --  0.7   ALKPHOS  --  90   ALT  --  23   AST  --  56*   LIPASE  --  433*   TROPI 0.066* 0.070*     Recent Labs   Lab Test  04/09/17   0725   CHOL  149   HDL  59   LDL  71   TRIG  95       Recent Labs  Lab 11/02/17  0400   WBC 13.1*   HGB 12.5   HCT 38.3   MCV 95        No results for input(s): PH, PHV, PO2, PO2V, SAT, PCO2, PCO2V, HCO3, HCO3V in the last 168 hours.    Recent Labs  Lab 11/02/17 0400   NTBNPI 167       Recent Labs  Lab 11/02/17 0400   DD 1.2*     No results for input(s): SED, CRP in the last 168 hours.    Recent Labs  Lab 11/02/17 0400        No results for input(s): TSH in the last 168 hours.  No results for input(s): COLOR, APPEARANCE, " URINEGLC, URINEBILI, URINEKETONE, SG, UBLD, URINEPH, PROTEIN, UROBILINOGEN, NITRITE, LEUKEST, RBCU, WBCU in the last 168 hours.    Imaging:  Recent Results (from the past 48 hour(s))   Chest CT, IV contrast only - PE protocol    Narrative    CT CHEST PULMONARY EMBOLISM W CONTRAST  2017 4:53 AM      HISTORY: Chest pain and shortness of breath.     TECHNIQUE: CT chest with intravenous contrast using the pulmonary  embolus protocol. Radiation dose for this scan was reduced using  automated exposure control, adjustment of the mA and/or kV according  to patient size, or iterative reconstruction technique. 56 mL  Isovue-370.     COMPARISON: None.    FINDINGS: Evaluation of the pulmonary arterial system shows no  evidence of embolus. There is no aortic aneurysm or dissection. There  is atherosclerotic calcification of the thoracic aorta. There are  coronary artery atherosclerotic calcifications. The heart is mildly  enlarged. There is no mediastinal, hilar or axillary lymph node  enlargement. Postoperative changes in the right hemithorax. There is  dependent atelectasis bilaterally. No pneumothorax or pleural  effusion. Images through the upper abdomen show no acute  abnormalities. There is nonspecific thickening in the left adrenal  gland. Degenerative disease in the spine.      Impression    IMPRESSION: There is no pulmonary embolus, aortic aneurysm, or  dissection. No acute abnormality.       Echo:  Recent Results (from the past 4320 hour(s))   Echo complete    Narrative    [id number]  [facility]  [id number]  [id number]^MARIA DOLORES^ALTHEA^JOSÉ LUIS           [facility]  Echocardiography Laboratory  [address] [place] [address]        Name: MERRILL HENLEY  MRN: [id number]  : 1940  Study Date: 2017 08:04 AM  Age: 77 yrs  Gender: Female  Patient Location: [facility]  Reason For Study: Chest Pain  Ordering Physician: ALTHEA WHITTEN  Referring Physician: Kayla Delgadillo MD  Performed By: Desiree Lawrence RDCS      BSA: 1.5 m2  Height: 57 in  Weight: 122 lb  HR: 69  BP: 116/59 mmHg  _____________________________________________________________________________  __        Procedure  Complete Portable Echo Adult.  _____________________________________________________________________________  __        Interpretation Summary     The visual ejection fraction is estimated at 65-70%.  No regional wall motion abnormalities noted.  There is mild biatrial enlargement.  There is mild (1+) aortic regurgitation.  Compared to prior study, there is no significant change.  _____________________________________________________________________________  __        Left Ventricle  The left ventricle is normal in size. A sigmoid septum is present. The  transmitral spectral Doppler flow pattern is normal for age. The visual  ejection fraction is estimated at 65-70%. No regional wall motion  abnormalities noted.     Right Ventricle  The right ventricle is normal in structure, function and size.     Atria  The left atrium is mildly dilated. There is mild biatrial enlargement.     Mitral Valve  The mitral valve is normal in structure and function.        Tricuspid Valve  Normal tricuspid valve. There is physiologic tricuspid regurgitation. Right  ventricular systolic pressure is normal.     Aortic Valve  There is mild trileaflet aortic sclerosis. There is mild (1+) aortic  regurgitation. This aortic regurgitation is central valvular.     Pulmonic Valve  Normal pulmonic valve.     Vessels  The aortic root is normal size. The ascending aorta is Borderline dilated. The  inferior vena cava is not dilated.     Pericardium  There is no pericardial effusion.        Rhythm  Sinus rhythm was noted.  _____________________________________________________________________________  __  MMode/2D Measurements & Calculations  IVSd: 0.94 cm     LVIDd: 4.3 cm  LVIDs: 2.2 cm  LVPWd: 1.1 cm  FS: 48.8 %  EDV(Teich): 84.0 ml  ESV(Teich): 16.4 ml  LV mass(C)d: 146.1  grams  LV mass(C)dI: 100.2 grams/m2  Ao root diam: 2.9 cm  LA dimension: 3.4 cm  asc Aorta Diam: 3.8 cm  LA/Ao: 1.2  LVOT diam: 2.0 cm  LVOT area: 3.1 cm2  LA Volume (BP): 48.0 ml  LA Volume Index (BP): 32.9 ml/m2  RWT: 0.50           Doppler Measurements & Calculations  MV E max john: 58.2 cm/sec  MV A max john: 81.4 cm/sec  MV E/A: 0.71  MV dec time: 0.25 sec  Ao V2 max: 135.0 cm/sec  Ao max P.0 mmHg  AI P1/2t: 467.1 msec  TR max john: 228.0 cm/sec  TR max P.8 mmHg  E/E' av.1  Lateral E/e': 6.2  Med E to E': 9.9  Medial E/e': 9.9              _____________________________________________________________________________  __        Report approved by: Cricket Wu 2017 09:48 AM

## 2017-11-02 NOTE — ED PROVIDER NOTES
"  History     Chief Complaint:  Shortness of Breath    HPI   Won Huggins is a 77 year old female with a history of hypertension and CVA who presents with her daughter to the ED for evaluation of shortness of breath. At 2300 last night, she began having some shortness of breath. She also has some pain at her neck and chest area. She denies any cough, fever, or trouble with expiration. She also denies any similar episodes to this in the past.    Allergies:  NKDA    Medications:    Hydrochlorothiazide  Minocycline  Losartan  Zofran  Norco  Zantac  Lopressor  AAlbuterol  Aspirin  Zyrtec  Tarceva  Flonase  Ambien  Robitussin    Past Medical History:    Cancer  CVA  HTN  Tibial fracture    Past Surgical History:    Biopsy  Lobectomy    Family History:    No past pertinent family history.    Social History:  Marital Status:  Single [1]  Negative for tobacco use.  Negative for alcohol use.    Review of Systems   Constitutional: Negative for fever.   Respiratory: Positive for shortness of breath. Negative for cough.    Cardiovascular: Positive for chest pain.   All other systems reviewed and are negative.    Physical Exam   First Vitals:  BP: 151/88  Pulse: 74  Temp: 98.4  F (36.9  C)  Resp: 22  Height: 144.8 cm (4' 9\")  Weight: 54.4 kg (120 lb)  SpO2: 100 %    Physical Exam  Constitutional:  Oriented to person, place, and time. In minor distress.  HENT:   Head:    Normocephalic.   Mouth/Throat:   Oropharynx is clear and moist. No angioedema.  Eyes:    EOM are normal. Pupils are equal, round, and reactive to light.   Neck:    Neck supple.   Cardiovascular:  Normal rate, regular rhythm and normal heart sounds.      Exam reveals no gallop and no friction rub.       No murmur heard.  Pulmonary/Chest:  Effort normal and breath sounds normal.      No respiratory distress. No wheezes. No rales.      No reproducible chest wall pain.  Abdominal:   Soft. No distension. No tenderness. No rebound and no guarding.   Musculoskeletal:  Normal " range of motion. 2+ distal equal pulses. No leg calf tenderness, swelling or edema.  Neurological:   Alert and oriented to person, place, and time.           Moves all 4 extremities spontaneously    Skin:    No rash noted. No pallor.     Emergency Department Course   ECG:  Indication: shortness of breath  Time: 0401  Vent. Rate 73 bpm. DC interval 168. QRS duration 70. QT/QTc 408/449. P-R-T axis 28 -45 35.  Normal sinus rhythm. Left axis deviation. No changes from 4/8/2017. Abormal ECG. Read time: 0401    Time: 0459  Vent. Rate 82 bpm. DC interval 160. QRS duration 78. QT/QTc 398/464. P-R-T axis 27 -50 43.  Normal sinus rhythm. Left axis deviation. Abnormal ECG. Read time: 0500    Imaging:  Radiographic findings were communicated with the patient who voiced understanding of the findings.  CT Chest w/ IV contrast - PE protocol:   There is no pulmonary embolus, aortic aneurysm, or  dissection. No acute abnormality, as per radiology.     Laboratory:  CBC: WBC: 13.1 (H), HGB: 12.5, PLT: 184  CMP: Glucose 108 (H), Calcium 8.2 (L), Albumin 2.7 (L), AST 56 (H), o/w WNL (Creatinine: 0.55)    0400 Troponin: 0.070 (H)    Lipase: 433 (H)    D dimer: 1.2 (H)    BNP: 167    INR: pending    PTT: 26    Interventions:  0410 Lorazepam, 1 mg, IV injection  0411 Mylanta 15 mL PO   Aspirin 324 mg PO  0412 Lidocaine 15 mL Mouth/Throat  0418 Albuterol 2.5 mg nebulization  0520 Nitroglycerin, 0.4 mg, Sublingual  Heparin drip IV  Please see MAR for full list of medications administered in the ED.    Emergency Department Course:  Nursing notes and vitals reviewed. 0410 I performed an exam of the patient as documented above.     IV inserted. Medicine administered as documented above. Blood drawn. This was sent to the lab for further testing, results above.    The patient was sent for a Chest CT while in the emergency department, findings above.     EKG obtained in the ED, see results above.     0525 I rechecked the patient and discussed the  results of her workup thus far. Nitroglycerin relieved her symptoms.    0535  I consulted with Dr. Najera of the hospitalist services. They are in agreement to accept the patient for admission.    Findings and plan explained to the Patient and daughter who consents to admission. Discussed the patient with Dr. Najera, who will admit the patient to a cardiac tele bed for further monitoring, evaluation, and treatment.    Impression & Plan      Medical Decision Making:  Won Huggins is a 77 year old female that came in complaining of shortness of breath and atypical chest pain. Differential includes asthma exacerbation, pneumonia, ACS equivalent, CHF, pneumothorax, PE, and other causes. She had a workup here in the ED. EKG X2 showed no ischemic changes. She did end up having an elevated D dimer that was followed up by a CT of her chest, which was negative for PE. Initial Troponin was elevated, and it was noted that Nitroglycerin did resolve her symptoms. The patient was started on Heparin and I believe is ruled in for NSTEMI. She will be admitted for further monitoring.    Critical Care time:  was 30 minutes for this patient excluding procedures.    Diagnosis:    ICD-10-CM   1. NSTEMI (non-ST elevated myocardial infarction) (H) I21.4     Disposition:  Admitted to cardiac tele.    I, Rachel Andrews, am serving as a scribe on 11/2/2017 at 4:02 AM to personally document services performed by Ceferino Prasad MD based on my observations and the provider's statements to me.     Rachel Andrews  11/2/2017   [facility] EMERGENCY DEPARTMENT       Ceferino Prasad MD  11/02/17 0552

## 2017-11-02 NOTE — ED NOTES
[facility]  ED Nurse Handoff Report    Won Huggins is a 77 year old female who presents with difficulty breathing, specifically on inhalation.  Pt w/elevated trop and some relief of sx with nitro.   Pt is alert and 1x assist to ambulate.  Daughter at bedside interpreting.    ED Chief complaint: Shortness of Breath  . ED Diagnosis:   Final diagnoses:   NSTEMI (non-ST elevated myocardial infarction) (H)     Allergies: No Known Allergies    Code Status: Full Code  Activity level - Baseline/Home:  Stand with Assist. Activity Level - Current:   Stand with Assist. Lift room needed: No. Bariatric: No   Needed: Yes   Isolation: No. Infection: Not Applicable.     Vital Signs:   Vitals:    11/02/17 0515 11/02/17 0517 11/02/17 0520 11/02/17 0528   BP:   125/60    Pulse:       Resp:       Temp:       TempSrc:       SpO2: 96% 96% 92% 96%   Weight:       Height:           Cardiac Rhythm:  ,      Pain level:    Patient confused: No. Patient Falls Risk: Yes.   Elimination Status: Has voided   Patient Report - Initial Complaint: difficulty breathing. Focused Assessment: pain w/inspiration  Tests Performed: CT, blood, EKG. Abnormal Results: elevated troponin.   Treatments provided: nitro  Family Comments: daughter at bedside  OBS brochure/video discussed/provided to patient:  N/A  ED Medications:   Medications   albuterol neb solution 2.5 mg (2.5 mg Nebulization Given 11/2/17 0418)   nitroGLYcerin (NITROSTAT) sublingual tablet 0.4 mg (0.4 mg Sublingual Given 11/2/17 0520)   aspirin chewable tablet 324 mg (324 mg Oral Given 11/2/17 0411)   alum & mag hydroxide-simethicone (MYLANTA ES/MAALOX  ES) suspension 15 mL (15 mLs Oral Given 11/2/17 0411)     And   lidocaine (viscous) (XYLOCAINE) 2 % solution 15 mL (15 mLs Mouth/Throat Given 11/2/17 0412)   LORazepam (ATIVAN) tablet 1 mg (1 mg Oral Given 11/2/17 0410)   0.9% sodium chloride BOLUS (0 mLs Intravenous Stopped 11/2/17 0452)   iopamidol (ISOVUE-370) solution 500  mL (56 mLs Intravenous Given 11/2/17 0444)     Drips infusing:Heparin  For the majority of the shift, the patient's behavior Green. Interventions performed were n/a.     Severe Sepsis OR Septic Shock Diagnosis Present: No      ED Nurse Name/Phone Number: Jonathan Seaver,   5:31 AM    RECEIVING UNIT ED HANDOFF REVIEW    Above ED Nurse Handoff Report was reviewed: Yes  Reviewed by: Angeles Salinas on November 2, 2017 at 6:25 AM

## 2017-11-02 NOTE — IP AVS SNAPSHOT
MRN:[id number]                      After Visit Summary   11/2/2017    Te Huggins    MRN: [id number]           Thank you!     Thank you for choosing [facility] for your care. Our goal is always to provide you with excellent care. Hearing back from our patients is one way we can continue to improve our services. Please take a few minutes to complete the written survey that you may receive in the mail after you visit. If you would like to speak to someone directly about your visit please contact Patient Relations at [phone]. Thank you!          Patient Information     Date Of Birth          1940        About your hospital stay     You were admitted on:  November 2, 2017 You last received care in the:  [place] [address] [facility]    You were discharged on:  November 3, 2017        Reason for your hospital stay       Chest pain, possible costochondritis                  Who to Call     For medical emergencies, please call 911.  For non-urgent questions about your medical care, please call your primary care provider or clinic, [phone]          Attending Provider     Provider Specialty    Ceferino Prasad MD Emergency Medicine    FloresBeka vega MD Family Practice    Anita Najera MD Internal Medicine       Primary Care Provider Office Phone # Fax #    Kayla Delgadillo [phone]      After Care Instructions     Activity       Your activity upon discharge: activity as tolerated            Diet       Follow this diet upon discharge:        Regular Diet Adult                  Follow-up Appointments     Follow-up and recommended labs and tests        Follow up with primary care provider, Kayla Delgadillo, within 7 days for hospital follow- up.  No follow up labs or test are needed.                  Pending Results     No orders found for last 3 day(s).            Statement of Approval     Ordered          11/03/17 1151  I have reviewed and agree with all the  "recommendations and orders detailed in this document.  EFFECTIVE NOW     Approved and electronically signed by:  Beka Flores MD             Admission Information     Date & Time Provider Department Dept. Phone    2017 Anita Najera MD [place] [address] [facility] [phone]      Your Vitals Were     Blood Pressure Pulse Temperature Respirations Height Weight    134/84 (BP Location: Left arm) 74 97.3  F (36.3  C) (Oral) 18 1.448 m (4' 9\") 55.5 kg (122 lb 4.8 oz)    Pulse Oximetry BMI (Body Mass Index)                98% 26.47 kg/m2          MyChart Information     [employer] lets you send messages to your doctor, view your test results, renew your prescriptions, schedule appointments and more. To sign up, go to [email or web address] . Click on \"Log in\" on the left side of the screen, which will take you to the Welcome page. Then click on \"Sign up Now\" on the right side of the page.     You will be asked to enter the access code listed below, as well as some personal information. Please follow the directions to create your username and password.     Your access code is: [email or web address]  Expires: 2017  5:15 PM     Your access code will  in 90 days. If you need help or a new code, please call your [facility] clinic or [phone].        Care EveryWhere ID     This is your Care EveryWhere ID. This could be used by other organizations to access your [facility] medical records  [id number]        Equal Access to Services     PRABHOJT PRADO : Haditz brewer Sonixon, waaxda luqadaha, qaybta kaalmada wilson, christian huffman. So [facility] [place] [phone].    ATENCIÓN: Si habla español, tiene a owens disposición servicios gratuitos de asistencia lingüística. Llame al [phone].    We comply with applicable federal civil rights laws and [place] laws. We do not discriminate on the basis of race, color, national origin, age, disability, sex, sexual orientation, or gender " identity.               Review of your medicines      START taking        Dose / Directions    atorvastatin 10 MG tablet   Commonly known as:  LIPITOR   Used for:  NSTEMI (non-ST elevated myocardial infarction) (H)        Dose:  10 mg   Take 1 tablet (10 mg) by mouth daily   Quantity:  30 tablet   Refills:  0       diclofenac 1 % Gel topical gel   Commonly known as:  VOLTAREN        Dose:  2 g   Place 2 g onto the skin 4 times daily   Quantity:  120 g   Refills:  0       Lidocaine-Menthol 3.99-1 % Ptch        Dose:  1 patch   Externally apply 1 patch topically daily for 10 days   Quantity:  10 patch   Refills:  0         CONTINUE these medicines which have NOT CHANGED        Dose / Directions    albuterol 108 (90 BASE) MCG/ACT Inhaler   Commonly known as:  PROAIR HFA/PROVENTIL HFA/VENTOLIN HFA        Dose:  1-2 puff   Inhale 1-2 puffs into the lungs every 4 hours as needed for shortness of breath / dyspnea or wheezing   Refills:  0       ASPIRIN EC PO        Dose:  81 mg   Take 81 mg by mouth daily   Refills:  0       cetirizine 10 MG tablet   Commonly known as:  zyrTEC        Dose:  10 mg   Take 10 mg by mouth daily as needed   Refills:  0       fish oil-omega-3 fatty acids 1000 MG capsule        Dose:  1 g   Take 1 g by mouth daily   Refills:  0       fluticasone 50 MCG/ACT spray   Commonly known as:  FLONASE        Dose:  2 spray   Spray 2 sprays into both nostrils daily as needed for rhinitis or allergies   Refills:  0       GINSENG ROYAL JELLY PLUS PO        Take by mouth as needed Chinese OTC med   Refills:  0       HYDROCHLOROTHIAZIDE PO        Dose:  12.5 mg   Take 12.5 mg by mouth daily   Refills:  0       LOSARTAN POTASSIUM PO        Dose:  25 mg   Take 25 mg by mouth daily   Refills:  0       MINOCYCLINE HCL PO        Dose:  100 mg   Take 100 mg by mouth daily   Refills:  0       NONFORMULARY        Take by mouth as needed Med from [place] prn sleep - Lexomil(bromazepam) 6mg   Refills:  0       TARCEVA  100 MG Tabs   Generic drug:  erlotinib        Dose:  100 mg   Take 100 mg by mouth every other day   Refills:  0       TYLENOL PO        Dose:  325-650 mg   Take 325-650 mg by mouth every 4 hours as needed for mild pain or fever   Refills:  0       VITAMIN D (CHOLECALCIFEROL) PO        Take by mouth daily Unknown OTC medication for bone health   Refills:  0       zolpidem 5 MG tablet   Commonly known as:  AMBIEN        Dose:  5 mg   Take 5 mg by mouth nightly as needed for sleep   Refills:  0            Where to get your medicines      These medications were sent to [place], [place] - [address] [facility]  [address] [facility] [place] [address]     Phone:  [phone]     atorvastatin 10 MG tablet    diclofenac 1 % Gel topical gel    Lidocaine-Menthol 3.99-1 % Ptch                Protect others around you: Learn how to safely use, store and throw away your medicines at www.disposemymeds.org.             Medication List: This is a list of all your medications and when to take them. Check marks below indicate your daily home schedule. Keep this list as a reference.      Medications           Morning Afternoon Evening Bedtime As Needed    albuterol 108 (90 BASE) MCG/ACT Inhaler   Commonly known as:  PROAIR HFA/PROVENTIL HFA/VENTOLIN HFA   Inhale 1-2 puffs into the lungs every 4 hours as needed for shortness of breath / dyspnea or wheezing                                ASPIRIN EC PO   Take 81 mg by mouth daily   Last time this was given:  81 mg on 11/3/2017  8:04 AM                                atorvastatin 10 MG tablet   Commonly known as:  LIPITOR   Take 1 tablet (10 mg) by mouth daily   Last time this was given:  10 mg on 11/3/2017  8:04 AM                                cetirizine 10 MG tablet   Commonly known as:  zyrTEC   Take 10 mg by mouth daily as needed                                diclofenac 1 % Gel topical gel   Commonly known as:  VOLTAREN   Place 2 g onto the skin  4 times daily   Last time this was given:  2 g on 11/3/2017 12:49 PM                                fish oil-omega-3 fatty acids 1000 MG capsule   Take 1 g by mouth daily                                fluticasone 50 MCG/ACT spray   Commonly known as:  FLONASE   Spray 2 sprays into both nostrils daily as needed for rhinitis or allergies                                GINSENG ROYAL JELLY PLUS PO   Take by mouth as needed Chinese OTC med                                HYDROCHLOROTHIAZIDE PO   Take 12.5 mg by mouth daily                                Lidocaine-Menthol 3.99-1 % Ptch   Externally apply 1 patch topically daily for 10 days                                LOSARTAN POTASSIUM PO   Take 25 mg by mouth daily                                MINOCYCLINE HCL PO   Take 100 mg by mouth daily                                NONFORMULARY   Take by mouth as needed Med from [facility] prn sleep - Lexomil(bromazepam) 6mg                                TARCEVA 100 MG Tabs   Take 100 mg by mouth every other day   Generic drug:  erlotinib                                TYLENOL PO   Take 325-650 mg by mouth every 4 hours as needed for mild pain or fever   Last time this was given:  650 mg on 11/2/2017  1:10 PM                                VITAMIN D (CHOLECALCIFEROL) PO   Take by mouth daily Unknown OTC medication for bone health                                zolpidem 5 MG tablet   Commonly known as:  AMBIEN   Take 5 mg by mouth nightly as needed for sleep

## 2017-11-02 NOTE — PHARMACY-ADMISSION MEDICATION HISTORY
Admission medication history interview status for this patient is complete. See [facility] admission navigator for allergy information, prior to admission medications and immunization status.     Medication history interview source(s):Patient and Family  Medication history resources (including written lists, pill bottles, clinic record):pill bottles  [facility]    Changes made to PTA medication list:  Added: fish oil, bone health otc, Chinese Ginseng, med from [place] for sleep Lexomil bromazepam 6mg  Deleted: metoprolol, robitussin, norco, zofran, zantac  Changed: zyrtec to prn    Actions taken by pharmacist (provider contacted, etc):None     Additional medication history information:None    Medication reconciliation/reorder completed by provider prior to medication history? No    Do you take OTC medications (eg tylenol, ibuprofen, fish oil, eye/ear drops, etc)? Y(Y/N)    For patients on insulin therapy: N (Y/N)  Lantus/levemir/NPH/Mix 70/30 dose:   (Y/N) (see Med list for doses)   Sliding scale Novolog Y/N  If Yes, do you have a baseline novolog pre-meal dose:  units with meals  Patients eat three meals a day:   Y/N    How many episodes of hypoglycemia do you have per week: _______  How many missed doses do you have per week: ______  How many times do you check your blood glucose per day: _______   Any Barriers to therapy - Be specific :  cost of medications, comfortable with giving injections (if applicable), comfortable and confident with current diabetes regimen: Y/N ______________      Prior to Admission medications    Medication Sig Last Dose Taking? Auth Provider   VITAMIN D, CHOLECALCIFEROL, PO Take by mouth daily Unknown OTC medication for bone health 11/1/2017 at Unknown time Yes Unknown, Entered By History   ASPIRIN EC PO Take 81 mg by mouth daily 11/1/2017 at Unknown time Yes Unknown, Entered By History   Ginsengs-Royal Jelly-Vit B12 (GINSENG ROYAL JELLY PLUS PO) Take by mouth as  needed Chinese OTC med  Yes Unknown, Entered By History   NONFORMULARY Take by mouth as needed Med from [facility] prn sleep - Lexomil(bromazepam) 6mg  Yes Unknown, Entered By History   HYDROCHLOROTHIAZIDE PO Take 12.5 mg by mouth daily  11/1/2017 at Unknown time Yes Reported, Patient   MINOCYCLINE HCL PO Take 100 mg by mouth daily  11/1/2017 at Unknown time Yes Reported, Patient   LOSARTAN POTASSIUM PO Take 25 mg by mouth daily  11/1/2017 at Unknown time Yes Reported, Patient   Acetaminophen (TYLENOL PO) Take 325-650 mg by mouth every 4 hours as needed for mild pain or fever  Yes Unknown, Entered By History   albuterol (PROAIR HFA/PROVENTIL HFA/VENTOLIN HFA) 108 (90 BASE) MCG/ACT Inhaler Inhale 1-2 puffs into the lungs every 4 hours as needed for shortness of breath / dyspnea or wheezing  Yes Unknown, Entered By History   cetirizine (ZYRTEC) 10 MG tablet Take 10 mg by mouth daily as needed  11/1/2017 at Unknown time Yes Unknown, Entered By History   erlotinib (TARCEVA) 100 MG TABS Take 100 mg by mouth every other day  11/1/2017 at Unknown time Yes Unknown, Entered By History   fluticasone (FLONASE) 50 MCG/ACT spray Spray 2 sprays into both nostrils daily as needed for rhinitis or allergies  Yes Unknown, Entered By History   fish oil-omega-3 fatty acids 1000 MG capsule Take 1 g by mouth daily 11/1/2017 at Unknown time Yes Unknown, Entered By History   zolpidem (AMBIEN) 5 MG tablet Take 5 mg by mouth nightly as needed for sleep   Unknown, Entered By History

## 2017-11-03 VITALS
WEIGHT: 122.3 LBS | HEIGHT: 57 IN | RESPIRATION RATE: 18 BRPM | SYSTOLIC BLOOD PRESSURE: 134 MMHG | TEMPERATURE: 97.3 F | OXYGEN SATURATION: 98 % | HEART RATE: 74 BPM | DIASTOLIC BLOOD PRESSURE: 84 MMHG | BODY MASS INDEX: 26.38 KG/M2

## 2017-11-03 LAB
ALBUMIN SERPL-MCNC: 2.4 G/DL (ref 3.4–5)
ALP SERPL-CCNC: 68 U/L (ref 40–150)
ALT SERPL W P-5'-P-CCNC: 15 U/L (ref 0–50)
ANION GAP SERPL CALCULATED.3IONS-SCNC: 4 MMOL/L (ref 3–14)
AST SERPL W P-5'-P-CCNC: 11 U/L (ref 0–45)
BASOPHILS # BLD AUTO: 0 10E9/L (ref 0–0.2)
BASOPHILS NFR BLD AUTO: 0.1 %
BILIRUB SERPL-MCNC: 1.2 MG/DL (ref 0.2–1.3)
BUN SERPL-MCNC: 24 MG/DL (ref 7–30)
CALCIUM SERPL-MCNC: 8.5 MG/DL (ref 8.5–10.1)
CHLORIDE SERPL-SCNC: 104 MMOL/L (ref 94–109)
CO2 SERPL-SCNC: 32 MMOL/L (ref 20–32)
CREAT SERPL-MCNC: 0.7 MG/DL (ref 0.52–1.04)
DIFFERENTIAL METHOD BLD: NORMAL
EOSINOPHIL # BLD AUTO: 0.1 10E9/L (ref 0–0.7)
EOSINOPHIL NFR BLD AUTO: 1 %
ERYTHROCYTE [DISTWIDTH] IN BLOOD BY AUTOMATED COUNT: 14.9 % (ref 10–15)
GFR SERPL CREATININE-BSD FRML MDRD: 81 ML/MIN/1.7M2
GLUCOSE SERPL-MCNC: 106 MG/DL (ref 70–99)
HCT VFR BLD AUTO: 36.9 % (ref 35–47)
HGB BLD-MCNC: 11.9 G/DL (ref 11.7–15.7)
IMM GRANULOCYTES # BLD: 0.1 10E9/L (ref 0–0.4)
IMM GRANULOCYTES NFR BLD: 0.7 %
LYMPHOCYTES # BLD AUTO: 1.8 10E9/L (ref 0.8–5.3)
LYMPHOCYTES NFR BLD AUTO: 15.9 %
MCH RBC QN AUTO: 31.3 PG (ref 26.5–33)
MCHC RBC AUTO-ENTMCNC: 32.2 G/DL (ref 31.5–36.5)
MCV RBC AUTO: 97 FL (ref 78–100)
MONOCYTES # BLD AUTO: 1 10E9/L (ref 0–1.3)
MONOCYTES NFR BLD AUTO: 9.4 %
NEUTROPHILS # BLD AUTO: 8 10E9/L (ref 1.6–8.3)
NEUTROPHILS NFR BLD AUTO: 72.9 %
NRBC # BLD AUTO: 0 10*3/UL
NRBC BLD AUTO-RTO: 0 /100
PLATELET # BLD AUTO: 153 10E9/L (ref 150–450)
POTASSIUM SERPL-SCNC: 4.2 MMOL/L (ref 3.4–5.3)
PROT SERPL-MCNC: 6.6 G/DL (ref 6.8–8.8)
RBC # BLD AUTO: 3.8 10E12/L (ref 3.8–5.2)
SODIUM SERPL-SCNC: 140 MMOL/L (ref 133–144)
WBC # BLD AUTO: 11 10E9/L (ref 4–11)

## 2017-11-03 PROCEDURE — 85025 COMPLETE CBC W/AUTO DIFF WBC: CPT | Performed by: INTERNAL MEDICINE

## 2017-11-03 PROCEDURE — 99217 ZZC OBSERVATION CARE DISCHARGE: CPT | Performed by: HOSPITALIST

## 2017-11-03 PROCEDURE — G0378 HOSPITAL OBSERVATION PER HR: HCPCS

## 2017-11-03 PROCEDURE — 80053 COMPREHEN METABOLIC PANEL: CPT | Performed by: INTERNAL MEDICINE

## 2017-11-03 PROCEDURE — 36415 COLL VENOUS BLD VENIPUNCTURE: CPT | Performed by: INTERNAL MEDICINE

## 2017-11-03 PROCEDURE — 25000132 ZZH RX MED GY IP 250 OP 250 PS 637: Performed by: INTERNAL MEDICINE

## 2017-11-03 RX ORDER — ATORVASTATIN CALCIUM 10 MG/1
10 TABLET, FILM COATED ORAL DAILY
Qty: 30 TABLET | Refills: 0 | Status: SHIPPED | OUTPATIENT
Start: 2017-11-03

## 2017-11-03 RX ADMIN — DICLOFENAC SODIUM 2 G: 10 GEL TOPICAL at 12:49

## 2017-11-03 RX ADMIN — HYDROCODONE BITARTRATE AND ACETAMINOPHEN 1 TABLET: 5; 325 TABLET ORAL at 08:22

## 2017-11-03 RX ADMIN — ASPIRIN 81 MG: 81 TABLET, COATED ORAL at 08:04

## 2017-11-03 RX ADMIN — MENTHOL 1 PATCH: 205.5 PATCH TOPICAL at 14:34

## 2017-11-03 RX ADMIN — MENTHOL 1 PATCH: 205.5 PATCH TOPICAL at 03:54

## 2017-11-03 RX ADMIN — ATORVASTATIN CALCIUM 10 MG: 10 TABLET, FILM COATED ORAL at 08:04

## 2017-11-03 RX ADMIN — HYDROCODONE BITARTRATE AND ACETAMINOPHEN 1 TABLET: 5; 325 TABLET ORAL at 10:48

## 2017-11-03 RX ADMIN — DICLOFENAC SODIUM 2 G: 10 GEL TOPICAL at 08:23

## 2017-11-03 ASSESSMENT — PAIN DESCRIPTION - DESCRIPTORS
DESCRIPTORS: SHARP
DESCRIPTORS: SHARP

## 2017-11-03 NOTE — PLAN OF CARE
Problem: Patient Care Overview  Goal: Plan of Care/Patient Progress Review  Outcome: No Change  PRIMARY DIAGNOSIS: CHEST PAIN  OUTPATIENT/OBSERVATION GOALS TO BE MET BEFORE DISCHARGE:  1. Ruled out acute coronary syndrome (negative or stable Troponin):  Yes  2. Pain Status: Pain free.  3. Appropriate provocative testing performed: Yes  - Stress Test Procedure: Echo  - Interpretation of cardiac rhythm per telemetry tech: NSR     4. Cleared by Consultants (if applicable):Yes  5. Return to near baseline physical activity: Yes  Discharge Planner Nurse   Safe discharge environment identified: Yes  Barriers to discharge: No       Entered by: Julián Prescott 11/03/2017 1:23 AM     Please review provider order for any additional goals.   Nurse to notify provider when observation goals have been met and patient is ready for discharge.  VSS. A/O. Saline Locked. Independent. Denies SOB/CP/Pain. Daughter @ bedside.

## 2017-11-03 NOTE — DISCHARGE SUMMARY
[facility]    Discharge Summary  Hospitalist    Date of Admission:  11/2/2017  Date of Discharge:  11/3/2017  Provider:  Beka Flores MD  Date of Service (when I last saw the patient): 11/03/17    Discharge Diagnoses   1. Atypical chest pain. Possible costochondritis.   Other.  2.  Hypertension.    3.  Elevated AST.    4.  H/o of rectal and lung adenoca.     Other medical issues:  Past Medical History:   Diagnosis Date     Cancer (H)      CVA (cerebral infarction)      Hypertension        History of Present Illness   Won Huggins is an 77 year old female who presented with chest pain.  Please see the admission history and physical for full details.    Hospital Course   Won Huggins was admitted on 11/2/2017.  The following problems were addressed during her hospitalization:    1. Atypical chest pain. She has chest pain in the anterior wall elicited with deep breathing, movements and on palpation.  No associated symptoms.  EKG is normal.  Troponins mild elevation making a flat line similar to prior workup.  Echocardiogram without acute abnormalities unchanged in comparison with the last one.  Cardiology consultation completed  Significant pain relieve with local patches of Menthol and Voltaren gel. She is going home and will follow up with her PCP.     Significant Results and Procedures   See below     Pending Results   Unresulted Labs Ordered in the Past 30 Days of this Admission     No orders found for last 61 day(s).          Code Status   Full Code       Primary Care Physician   Kayla Delgadillo    GEN:  Alert, oriented x 3, appears comfortable, NAD.  HEENT:  Normocephalic/atraumatic, no scleral icterus, no nasal discharge, mouth moist.  CV:  Regular rate and rhythm, no murmur or JVD.  S1 + S2 noted, no S3 or S4.  LUNGS:  Clear to auscultation bilaterally without rales/rhonchi/wheezing/retractions.  Symmetric chest rise on inhalation noted.     Discharge Disposition   Discharged to home    Consultations This  Hospital Stay   PHARMACY TO DOSE HEPARIN  CARDIOLOGY IP CONSULT  PHARMACY TO DOSE HEPARIN    Time Spent on this Encounter   I, Beka Flores, personally saw the patient today and spent greater than 30 minutes discharging this patient.     Discharge Orders     Reason for your hospital stay   Chest pain, possible costochondritis     Follow-up and recommended labs and tests    Follow up with primary care provider, Kayla Delgadillo, within 7 days for hospital follow- up.  No follow up labs or test are needed.     Activity   Your activity upon discharge: activity as tolerated     Full Code     Diet   Follow this diet upon discharge:       Regular Diet Adult       Discharge Medications   Current Discharge Medication List      START taking these medications    Details   diclofenac (VOLTAREN) 1 % GEL topical gel Place 2 g onto the skin 4 times daily  Qty: 120 g, Refills: 0    Associated Diagnoses: Chest pain on breathing      Lidocaine-Menthol 3.99-1 % PTCH Externally apply 1 patch topically daily for 10 days  Qty: 10 patch, Refills: 0    Associated Diagnoses: Chest pain on breathing         CONTINUE these medications which have NOT CHANGED    Details   VITAMIN D, CHOLECALCIFEROL, PO Take by mouth daily Unknown OTC medication for bone health      ASPIRIN EC PO Take 81 mg by mouth daily      Ginsengs-Royal Jelly-Vit B12 (GINSENG ROYAL JELLY PLUS PO) Take by mouth as needed Chinese OTC med      NONFORMULARY Take by mouth as needed Med from [place] prn sleep - Lexomil(bromazepam) 6mg      HYDROCHLOROTHIAZIDE PO Take 12.5 mg by mouth daily       MINOCYCLINE HCL PO Take 100 mg by mouth daily       LOSARTAN POTASSIUM PO Take 25 mg by mouth daily       Acetaminophen (TYLENOL PO) Take 325-650 mg by mouth every 4 hours as needed for mild pain or fever      albuterol (PROAIR HFA/PROVENTIL HFA/VENTOLIN HFA) 108 (90 BASE) MCG/ACT Inhaler Inhale 1-2 puffs into the lungs every 4 hours as needed for shortness of breath / dyspnea or  wheezing      cetirizine (ZYRTEC) 10 MG tablet Take 10 mg by mouth daily as needed       erlotinib (TARCEVA) 100 MG TABS Take 100 mg by mouth every other day       fluticasone (FLONASE) 50 MCG/ACT spray Spray 2 sprays into both nostrils daily as needed for rhinitis or allergies      fish oil-omega-3 fatty acids 1000 MG capsule Take 1 g by mouth daily      zolpidem (AMBIEN) 5 MG tablet Take 5 mg by mouth nightly as needed for sleep           Allergies   No Known Allergies  Data   Most Recent 3 CBC's:  Recent Labs   Lab Test  11/03/17   0655  11/02/17   0400  10/01/17   0941   WBC  11.0  13.1*  7.2   HGB  11.9  12.5  12.3   MCV  97  95  91   PLT  153  184  166      Most Recent 3 BMP's:  Recent Labs   Lab Test  11/03/17   0655  11/02/17   0400  10/01/17   0941   NA  140  136  139   POTASSIUM  4.2  4.8  3.6   CHLORIDE  104  102  104   CO2  32  29  30   BUN  24  17  15   CR  0.70  0.55  0.61   ANIONGAP  4  5  5   STEPHEN  8.5  8.2*  9.4   GLC  106*  108*  96     Most Recent 2 LFT's:  Recent Labs   Lab Test  11/03/17   0655  11/02/17   0400   AST  11  56*   ALT  15  23   ALKPHOS  68  90   BILITOTAL  1.2  0.7     Most Recent INR's and Anticoagulation Dosing History:  Anticoagulation Dose History     Recent Dosing and Labs Latest Ref Rng & Units 7/4/2015 9/3/2015 4/7/2017 11/2/2017    INR 0.86 - 1.14 2.76(H) 2.24(H) 0.92 0.85(L)        Most Recent 3 Troponin's:  Recent Labs   Lab Test  11/02/17   1200  11/02/17   0730  11/02/17   0400   TROPI  0.065*  0.066*  0.070*     Most Recent Cholesterol Panel:  Recent Labs   Lab Test  04/09/17   0725   CHOL  149   LDL  71   HDL  59   TRIG  95     Most Recent 6 Bacteria Isolates From Any Culture (See EPIC Reports for Culture Details):  Recent Labs   Lab Test  08/30/11   1835   CULT  >100,000 colonies/mL Citrobacter koseri Report faxed to  on 9/2/11 dsk     Most Recent TSH, T4 and A1c Labs:No lab results found.  Results for orders placed or performed during the hospital  encounter of 11/02/17   Chest CT, IV contrast only - PE protocol    Narrative    CT CHEST PULMONARY EMBOLISM W CONTRAST  11/2/2017 4:53 AM      HISTORY: Chest pain and shortness of breath.     TECHNIQUE: CT chest with intravenous contrast using the pulmonary  embolus protocol. Radiation dose for this scan was reduced using  automated exposure control, adjustment of the mA and/or kV according  to patient size, or iterative reconstruction technique. 56 mL  Isovue-370.     COMPARISON: None.    FINDINGS: Evaluation of the pulmonary arterial system shows no  evidence of embolus. There is no aortic aneurysm or dissection. There  is atherosclerotic calcification of the thoracic aorta. There are  coronary artery atherosclerotic calcifications. The heart is mildly  enlarged. There is no mediastinal, hilar or axillary lymph node  enlargement. Postoperative changes in the right hemithorax. There is  dependent atelectasis bilaterally. No pneumothorax or pleural  effusion. Images through the upper abdomen show no acute  abnormalities. There is nonspecific thickening in the left adrenal  gland. Degenerative disease in the spine.      Impression    IMPRESSION: There is no pulmonary embolus, aortic aneurysm, or  dissection. No acute abnormality.    JULISSA WALKER MD           Disclaimer: This note consists of symbols derived from keyboarding, dictation and/or voice recognition software. As a result, there may be errors in the script that have gone undetected. Please consider this when interpreting information found in this chart.

## 2017-11-03 NOTE — PROGRESS NOTES
OBSERVATION patient END time: 1445. Reviewed discharge instructions with patient and family via . Family and patient verbalized back understanding.

## 2017-11-03 NOTE — PLAN OF CARE
Problem: Patient Care Overview  Goal: Plan of Care/Patient Progress Review  Outcome: No Change  PRIMARY DIAGNOSIS: CHEST PAIN  OUTPATIENT/OBSERVATION GOALS TO BE MET BEFORE DISCHARGE:  1. Ruled out acute coronary syndrome (negative or stable Troponin):  Yes  2. Pain Status: Pain free.  3. Appropriate provocative testing performed: Yes  - Stress Test Procedure: Echo  - Interpretation of cardiac rhythm per telemetry tech: NSR     4. Cleared by Consultants (if applicable):Yes  5. Return to near baseline physical activity: Yes  Discharge Planner Nurse   Safe discharge environment identified: Yes  Barriers to discharge: No       Entered by: Julián Prescott 11/03/2017 4:53 AM     Please review provider order for any additional goals.   Nurse to notify provider when observation goals have been met and patient is ready for discharge.  VSS. A/O. Saline Locked. Independent. Denies SOB/CP/Pain. Daughter @ bedside throughout shift. LLE Edema +3.

## 2017-11-03 NOTE — PLAN OF CARE
Problem: Patient Care Overview  Goal: Discharge Needs Assessment  Outcome: No Change  PRIMARY DIAGNOSIS: CHEST PAIN  OUTPATIENT/OBSERVATION GOALS TO BE MET BEFORE DISCHARGE:  1. Ruled out acute coronary syndrome (negative or stable Troponin):  Yes  2. Pain Status: Improved-controlled with oral pain medications.  3. Appropriate provocative testing performed: Yes  - Stress Test Procedure: Echo  - Interpretation of cardiac rhythm per telemetry tech: SR     4. Cleared by Consultants (if applicable):Yes  5. Return to near baseline physical activity: No  Discharge Planner Nurse   Safe discharge environment identified: Yes  Barriers to discharge: No       Entered by: Amilcar Leos 11/02/2017 10:34 PM          Pt alert and oriented answered all the questions via [employer] . c/o mid chest pain Norco given x1 with pt report decrease in pain. +3 left leg edema. On 1L NC for comfort. Tele SR, VSS.            Please review provider order for any additional goals.   Nurse to notify provider when observation goals have been met and patient is ready for discharge.

## 2017-11-03 NOTE — PLAN OF CARE
Problem: Patient Care Overview  Goal: Discharge Needs Assessment  Outcome: No Change  PRIMARY DIAGNOSIS: CHEST PAIN  OUTPATIENT/OBSERVATION GOALS TO BE MET BEFORE DISCHARGE:  1. Ruled out acute coronary syndrome (negative or stable Troponin):  Yes  2. Pain Status: Improved-controlled with oral pain medications.  3. Appropriate provocative testing performed: Yes  - Stress Test Procedure: Echo  - Interpretation of cardiac rhythm per telemetry tech: SR     4. Cleared by Consultants (if applicable):Yes  5. Return to near baseline physical activity: No  Discharge Planner Nurse   Safe discharge environment identified: Yes  Barriers to discharge: No       Entered by: Amilcar Leos 11/02/2017 8:45 PM     Please review provider order for any additional goals.   Nurse to notify provider when observation goals have been met and patient is ready for discharge.

## 2017-11-03 NOTE — PLAN OF CARE
Problem: Patient Care Overview  Goal: Plan of Care/Patient Progress Review  AO x 4. Up independently. Ambulated in hallway with staff >500 feet. States chest pain improving with the patches. Complains of headache, pain medication given which helped the pain per pt report. Discharging home today.    PRIMARY DIAGNOSIS: CHEST PAIN  OUTPATIENT/OBSERVATION GOALS TO BE MET BEFORE DISCHARGE:    1. Ruled out acute coronary syndrome (negative or stable Troponin): Yes  2. Resolution of pain or adequate pain control via oral regiment: Yes  3. Appropriate provocative testing performed. Yes  Results: See labs\  4. Return to near baseline physical activity (including ADL and safe ambulation)  5. Cleared for discharge by consultants (if involved)  6. Safe discharge environment identified by care coordination (if unable to fully meet other goals)    Please review provider order for any additional goals.     Nurse to notify provider when observation goals have been met and patient is ready for discharge.

## 2019-05-15 ENCOUNTER — APPOINTMENT (OUTPATIENT)
Dept: CT IMAGING | Facility: CLINIC | Age: 79
End: 2019-05-15
Attending: EMERGENCY MEDICINE
Payer: COMMERCIAL

## 2019-05-15 ENCOUNTER — HOSPITAL ENCOUNTER (EMERGENCY)
Facility: CLINIC | Age: 79
Discharge: HOME | End: 2019-05-15
Attending: EMERGENCY MEDICINE | Admitting: EMERGENCY MEDICINE
Payer: COMMERCIAL

## 2019-05-15 VITALS
DIASTOLIC BLOOD PRESSURE: 73 MMHG | SYSTOLIC BLOOD PRESSURE: 102 MMHG | TEMPERATURE: 97.9 F | OXYGEN SATURATION: 92 % | HEART RATE: 82 BPM | RESPIRATION RATE: 18 BRPM

## 2019-05-15 DIAGNOSIS — I10 ESSENTIAL HYPERTENSION: ICD-10-CM

## 2019-05-15 DIAGNOSIS — K59.00 CONSTIPATION, UNSPECIFIED CONSTIPATION TYPE: ICD-10-CM

## 2019-05-15 DIAGNOSIS — R10.84 ABDOMINAL PAIN, GENERALIZED: ICD-10-CM

## 2019-05-15 DIAGNOSIS — K46.9 PERISTOMAL HERNIA: ICD-10-CM

## 2019-05-15 LAB
ALBUMIN SERPL-MCNC: 3.3 G/DL (ref 3.4–5)
ALP SERPL-CCNC: 48 U/L (ref 40–150)
ALT SERPL W P-5'-P-CCNC: 35 U/L (ref 0–50)
ANION GAP SERPL CALCULATED.3IONS-SCNC: 6 MMOL/L (ref 3–14)
AST SERPL W P-5'-P-CCNC: 37 U/L (ref 0–45)
BASOPHILS # BLD AUTO: 0 10E9/L (ref 0–0.2)
BASOPHILS NFR BLD AUTO: 0.3 %
BILIRUB SERPL-MCNC: 0.4 MG/DL (ref 0.2–1.3)
BUN SERPL-MCNC: 16 MG/DL (ref 7–30)
CALCIUM SERPL-MCNC: 9 MG/DL (ref 8.5–10.1)
CHLORIDE SERPL-SCNC: 96 MMOL/L (ref 94–109)
CO2 SERPL-SCNC: 32 MMOL/L (ref 20–32)
CREAT BLD-MCNC: 0.7 MG/DL (ref 0.52–1.04)
CREAT SERPL-MCNC: 0.63 MG/DL (ref 0.52–1.04)
DIFFERENTIAL METHOD BLD: NORMAL
EOSINOPHIL # BLD AUTO: 0.1 10E9/L (ref 0–0.7)
EOSINOPHIL NFR BLD AUTO: 1.8 %
ERYTHROCYTE [DISTWIDTH] IN BLOOD BY AUTOMATED COUNT: 12.4 % (ref 10–15)
GFR SERPL CREATININE-BSD FRML MDRD: 81 ML/MIN/{1.73_M2}
GFR SERPL CREATININE-BSD FRML MDRD: 86 ML/MIN/{1.73_M2}
GLUCOSE SERPL-MCNC: 102 MG/DL (ref 70–99)
HCT VFR BLD AUTO: 41.2 % (ref 35–47)
HGB BLD-MCNC: 13.8 G/DL (ref 11.7–15.7)
IMM GRANULOCYTES # BLD: 0 10E9/L (ref 0–0.4)
IMM GRANULOCYTES NFR BLD: 0.3 %
LYMPHOCYTES # BLD AUTO: 2 10E9/L (ref 0.8–5.3)
LYMPHOCYTES NFR BLD AUTO: 26.9 %
MCH RBC QN AUTO: 31.1 PG (ref 26.5–33)
MCHC RBC AUTO-ENTMCNC: 33.5 G/DL (ref 31.5–36.5)
MCV RBC AUTO: 93 FL (ref 78–100)
MONOCYTES # BLD AUTO: 0.7 10E9/L (ref 0–1.3)
MONOCYTES NFR BLD AUTO: 10 %
NEUTROPHILS # BLD AUTO: 4.4 10E9/L (ref 1.6–8.3)
NEUTROPHILS NFR BLD AUTO: 60.7 %
NRBC # BLD AUTO: 0 10*3/UL
NRBC BLD AUTO-RTO: 0 /100
PLATELET # BLD AUTO: 192 10E9/L (ref 150–450)
POTASSIUM SERPL-SCNC: 3.4 MMOL/L (ref 3.4–5.3)
PROT SERPL-MCNC: 7.8 G/DL (ref 6.8–8.8)
RBC # BLD AUTO: 4.44 10E12/L (ref 3.8–5.2)
SODIUM SERPL-SCNC: 134 MMOL/L (ref 133–144)
WBC # BLD AUTO: 7.3 10E9/L (ref 4–11)

## 2019-05-15 PROCEDURE — 80053 COMPREHEN METABOLIC PANEL: CPT | Performed by: EMERGENCY MEDICINE

## 2019-05-15 PROCEDURE — 25000128 H RX IP 250 OP 636: Performed by: EMERGENCY MEDICINE

## 2019-05-15 PROCEDURE — 96375 TX/PRO/DX INJ NEW DRUG ADDON: CPT

## 2019-05-15 PROCEDURE — 99285 EMERGENCY DEPT VISIT HI MDM: CPT | Mod: 25

## 2019-05-15 PROCEDURE — 96361 HYDRATE IV INFUSION ADD-ON: CPT

## 2019-05-15 PROCEDURE — 74177 CT ABD & PELVIS W/CONTRAST: CPT

## 2019-05-15 PROCEDURE — 82565 ASSAY OF CREATININE: CPT

## 2019-05-15 PROCEDURE — 25000132 ZZH RX MED GY IP 250 OP 250 PS 637: Performed by: EMERGENCY MEDICINE

## 2019-05-15 PROCEDURE — 85025 COMPLETE CBC W/AUTO DIFF WBC: CPT | Performed by: EMERGENCY MEDICINE

## 2019-05-15 PROCEDURE — 96374 THER/PROPH/DIAG INJ IV PUSH: CPT | Mod: 59

## 2019-05-15 PROCEDURE — 96376 TX/PRO/DX INJ SAME DRUG ADON: CPT

## 2019-05-15 RX ORDER — POLYETHYLENE GLYCOL 3350 17 G/17G
POWDER, FOR SOLUTION ORAL
Qty: 527 G | Refills: 0 | Status: SHIPPED | OUTPATIENT
Start: 2019-05-15 | End: 2019-07-09

## 2019-05-15 RX ORDER — IOPAMIDOL 755 MG/ML
500 INJECTION, SOLUTION INTRAVASCULAR ONCE
Status: COMPLETED | OUTPATIENT
Start: 2019-05-15 | End: 2019-05-15

## 2019-05-15 RX ORDER — ONDANSETRON 2 MG/ML
4 INJECTION INTRAMUSCULAR; INTRAVENOUS ONCE
Status: COMPLETED | OUTPATIENT
Start: 2019-05-15 | End: 2019-05-15

## 2019-05-15 RX ORDER — HYDROMORPHONE HYDROCHLORIDE 1 MG/ML
0.5 INJECTION, SOLUTION INTRAMUSCULAR; INTRAVENOUS; SUBCUTANEOUS
Status: COMPLETED | OUTPATIENT
Start: 2019-05-15 | End: 2019-05-15

## 2019-05-15 RX ADMIN — SODIUM PHOSPHATE, DIBASIC AND SODIUM PHOSPHATE, MONOBASIC 1 ENEMA: 7; 19 ENEMA RECTAL at 19:18

## 2019-05-15 RX ADMIN — Medication 0.5 MG: at 18:35

## 2019-05-15 RX ADMIN — Medication 0.5 MG: at 16:42

## 2019-05-15 RX ADMIN — ONDANSETRON 4 MG: 2 INJECTION INTRAMUSCULAR; INTRAVENOUS at 19:11

## 2019-05-15 RX ADMIN — SODIUM CHLORIDE 500 ML: 9 INJECTION, SOLUTION INTRAVENOUS at 14:49

## 2019-05-15 RX ADMIN — SODIUM CHLORIDE 55 ML: 9 INJECTION, SOLUTION INTRAVENOUS at 15:47

## 2019-05-15 RX ADMIN — IOPAMIDOL 61 ML: 755 INJECTION, SOLUTION INTRAVENOUS at 15:47

## 2019-05-15 RX ADMIN — Medication 0.5 MG: at 15:03

## 2019-05-15 ASSESSMENT — ENCOUNTER SYMPTOMS
DIFFICULTY URINATING: 0
FEVER: 0
ABDOMINAL PAIN: 1
HEMATURIA: 0
VOMITING: 0
CONSTIPATION: 1
DYSURIA: 0

## 2019-05-15 NOTE — ED PROVIDER NOTES
History     Chief Complaint:  Abdominal pain    HPI     Won Huggins is a 78 year old female status post colostomy in 2003 who presents with abdominal pain. The patient states that last night she started experiencing left lower quadrant abdominal pain around the site of her colostomy. The pain has become progressively worse and she notes a low amount of hard stool passing into her ostomy bag. In the past, she has had this pain and then had a bowel movement and the pain subsided. The patient denies any vomiting, fevers, or urinary issues.    Allergies:  No known allergies     Medications:    Tylenol  Proair  Aspirin  Lipitor  Zyrtec  Tarceva  Flonase  Hydrochlorothiazide  Losartan   Minocycline  Ambien     Past Medical History:    Cancer  CVA  HTN  Pleural effusion  PE    Past Surgical History:    Hysterectomy  Lobectomy lung  Colostomy   Hip fracture tx  Cataract surgery    Family History:    No past pertinent family history.     Social History:  Patient presents with family  Negative for tobacco use.  Negative for alcohol use.  Marital Status:  Single     Review of Systems   Constitutional: Negative for fever.   Gastrointestinal: Positive for abdominal pain and constipation. Negative for vomiting.   Genitourinary: Negative for difficulty urinating, dysuria and hematuria.   All other systems reviewed and are negative.      Physical Exam     Patient Vitals for the past 24 hrs:   BP Temp Pulse Heart Rate Resp SpO2   05/15/19 1409 (!) 172/109 97.9  F (36.6  C) 96 96 18 100 %         Physical Exam    Constitutional:  Pleasant, age appropriate female who appears uncomfortable.  HEENT:    Oropharynx is moist  Eyes:    Conjunctiva normal  Neck:    Supple, no meningismus.     CV:     Regular rate and rhythm.      No murmurs, rubs or gallops.     No lower extremity edema.  PULM:    Clear to auscultation bilateral.       No respiratory distress.      Good air exchange.  ABD:    Soft, non-distended.       Focal tenderness just  lateral to the colostomy stoma with palpable stool and hernia.     No surrounding erythema to the stoma     Bowel sounds normal.     No pulsatile masses.       No rebound, guarding or rigidity.     No CVA tenderness.   MSK:     No gross deformity to all four extremities.   LYMPH:   No cervical lymphadenopathy.  NEURO:   Alert.  Good muscular tone, no atrophy.   Skin:    Warm, dry and intact.    Psych:    Mood is good and affect is appropriate.      Emergency Department Course   Imaging:  Radiographic findings were communicated with the patient who voiced understanding of the findings.  CT Abdomen/Pelvis with IV contrast:   1. Prior rectosigmoid colon surgery with left lower quadrant  colostomy. There is a small to moderate-sized parastomal hernia  containing colon. No evidence of bowel obstruction due to the hernia.  2. Mild haziness within the fat of the hernia sac, centered about a 1  cm opacity that abuts the sigmoid colon. The hazy opacities are  suggestive of inflammation within the hernia sac, possibly related to  mild diverticulitis.  3. No other cause of acute pain identified in the abdomen or pelvis, per radiology.       Laboratory:  CBC: WBC: 7.3, HGB: 13.8, PLT: 192  CMP: Glucose 102 (H), Albumin: 3.3 (L), o/w WNL (Creatinine: 0.63)  Creatinine POCT: 0.7, GFR: 81    Interventions:  1449 - NS 0.5 L IV  1503 - Dilaudid 0.5 mg IV  1642 Dilaudid, 0.5 mg, IV injection  Fleets enema    Emergency Department Course:  Nursing notes and vitals reviewed.  1430: I performed an exam of the patient as documented above.     Consult with colorectal surgery    Findings and plan explained to the Patient. Patient discharged home with instructions regarding supportive care, medications, and reasons to return. The importance of close follow-up was reviewed. The patient was prescribed Miralax.     I personally reviewed the laboratory and imaging results with the Patient and answered all related questions prior to discharge.      Impression & Plan    Medical Decision Makin-year-old French speaking female presented to the ED with abdominal pain adjacent to her colostomy.  On examination she has palpable hard stool with parastomal hernia.  Basic laboratory studies were unrevealing.  CT scan was undertaken to rule out obstruction for which there is none.  There is no evidence of incarceration or strangulation of hernia based on radiographic findings.  There is a large amount of stool near the stoma which is a likely source of the patient's discomfort.  There is also this finding of localized inflammation which may represent early diverticulitis.  This is not consistent with the patient's clinical history examination, and I feel this is unlikely to be clinically relevant.  I tried to manually remove the stool with massage but was unsuccessful.  After consultation with colorectal surgery, patient underwent fleets enema through her stoma site.  The results of this are currently pending.  If the patient has significant stool output, patient will be safe for discharge home with MiraLAX 3 times a day until stool is loose then once daily as needed to encourage passage of stool via colostomy.  Patient to follow-up PCP in the next 2 to 3 days to ensure improvement return to ED for any worsening symptoms.    Diagnosis:    ICD-10-CM    1. Abdominal pain, generalized R10.84    2. Constipation, unspecified constipation type K59.00    3. Peristomal hernia K46.9        Disposition:  discharged to home    Discharge Medications:     Medication List      Started    polyethylene glycol powder  Commonly known as:  MIRALAX  17 grams PO TID until loose stool then once daily as needed for constipation            Ceferino NOBLE, am serving as a scribe on 5/15/2019 at 2:30 PM to personally document services performed by Nura Haines MD based on my observations and the provider's statements to me.     Ceferino Andrade  5/15/2019   [facility]  [facility] EMERGENCY DEPARTMENT       Nura Haines MD  05/16/19 [id number]

## 2019-05-15 NOTE — ED AVS SNAPSHOT
[facility] Department  [address]  [facility] [address]  Phone:  [phone]  Fax:  [phone]                                    Won Huggins   MRN: [id number]    Department:  [facility] Emergency Department   Date of Visit:  5/15/2019           After Visit Summary Signature Page    I have received my discharge instructions, and my questions have been answered. I have discussed any challenges I see with this plan with the nurse or doctor.    ..........................................................................................................................................  Patient/Patient Representative Signature      ..........................................................................................................................................  Patient Representative Print Name and Relationship to Patient    ..................................................               ................................................  Date                                   Time    ..........................................................................................................................................  Reviewed by Signature/Title    ...................................................              ..............................................  Date                                               Time          22EPIC Rev 08/18

## 2019-05-16 NOTE — ED NOTES
Ostomy bag replaced.  Patient tolerated this well. She reports her pain is improved slightly after Dilaudid IV.

## 2019-05-16 NOTE — ED NOTES
After fleets enema patient is now passing moderate amount of stool from her ostomy site.  She reports he abdomen feels a little more soft, but is .  Patient has requested another enema if possible.  This was discussed with the MD.  Current plan is to monitor the patient's response to the enema and discharge her home to take Miralax to continue to encourage stool to pass.

## 2019-07-09 ENCOUNTER — APPOINTMENT (OUTPATIENT)
Dept: GENERAL RADIOLOGY | Facility: CLINIC | Age: 79
End: 2019-07-09
Attending: EMERGENCY MEDICINE
Payer: COMMERCIAL

## 2019-07-09 ENCOUNTER — HOSPITAL ENCOUNTER (OUTPATIENT)
Facility: CLINIC | Age: 79
Setting detail: OBSERVATION
Discharge: HOME | End: 2019-07-10
Attending: EMERGENCY MEDICINE | Admitting: INTERNAL MEDICINE
Payer: COMMERCIAL

## 2019-07-09 ENCOUNTER — APPOINTMENT (OUTPATIENT)
Dept: CT IMAGING | Facility: CLINIC | Age: 79
End: 2019-07-09
Attending: EMERGENCY MEDICINE
Payer: COMMERCIAL

## 2019-07-09 ENCOUNTER — APPOINTMENT (OUTPATIENT)
Dept: ULTRASOUND IMAGING | Facility: CLINIC | Age: 79
End: 2019-07-09
Attending: EMERGENCY MEDICINE
Payer: COMMERCIAL

## 2019-07-09 DIAGNOSIS — R07.9 RIGHT-SIDED CHEST PAIN: ICD-10-CM

## 2019-07-09 DIAGNOSIS — R79.89 ELEVATED TROPONIN: ICD-10-CM

## 2019-07-09 DIAGNOSIS — R06.00 DYSPNEA, UNSPECIFIED TYPE: ICD-10-CM

## 2019-07-09 PROBLEM — R06.02 SHORTNESS OF BREATH: Status: ACTIVE | Noted: 2019-07-09

## 2019-07-09 LAB
ALBUMIN SERPL-MCNC: 3.1 G/DL (ref 3.4–5)
ALP SERPL-CCNC: 46 U/L (ref 40–150)
ALT SERPL W P-5'-P-CCNC: 36 U/L (ref 0–50)
ANION GAP SERPL CALCULATED.3IONS-SCNC: 8 MMOL/L (ref 3–14)
AST SERPL W P-5'-P-CCNC: 32 U/L (ref 0–45)
BASOPHILS # BLD AUTO: 0 10E9/L (ref 0–0.2)
BASOPHILS NFR BLD AUTO: 0.3 %
BILIRUB SERPL-MCNC: 0.5 MG/DL (ref 0.2–1.3)
BUN SERPL-MCNC: 17 MG/DL (ref 7–30)
CALCIUM SERPL-MCNC: 9.4 MG/DL (ref 8.5–10.1)
CHLORIDE SERPL-SCNC: 97 MMOL/L (ref 94–109)
CO2 SERPL-SCNC: 28 MMOL/L (ref 20–32)
CREAT SERPL-MCNC: 0.72 MG/DL (ref 0.52–1.04)
D DIMER PPP FEU-MCNC: 0.6 UG/ML FEU (ref 0–0.5)
DIFFERENTIAL METHOD BLD: NORMAL
EOSINOPHIL # BLD AUTO: 0.1 10E9/L (ref 0–0.7)
EOSINOPHIL NFR BLD AUTO: 1.4 %
ERYTHROCYTE [DISTWIDTH] IN BLOOD BY AUTOMATED COUNT: 12.5 % (ref 10–15)
GFR SERPL CREATININE-BSD FRML MDRD: 80 ML/MIN/{1.73_M2}
GLUCOSE SERPL-MCNC: 127 MG/DL (ref 70–99)
HCT VFR BLD AUTO: 38.3 % (ref 35–47)
HGB BLD-MCNC: 12.8 G/DL (ref 11.7–15.7)
IMM GRANULOCYTES # BLD: 0 10E9/L (ref 0–0.4)
IMM GRANULOCYTES NFR BLD: 0.3 %
LYMPHOCYTES # BLD AUTO: 2.1 10E9/L (ref 0.8–5.3)
LYMPHOCYTES NFR BLD AUTO: 33 %
MAGNESIUM SERPL-MCNC: 2 MG/DL (ref 1.6–2.3)
MCH RBC QN AUTO: 30.7 PG (ref 26.5–33)
MCHC RBC AUTO-ENTMCNC: 33.4 G/DL (ref 31.5–36.5)
MCV RBC AUTO: 92 FL (ref 78–100)
MONOCYTES # BLD AUTO: 0.6 10E9/L (ref 0–1.3)
MONOCYTES NFR BLD AUTO: 10 %
NEUTROPHILS # BLD AUTO: 3.5 10E9/L (ref 1.6–8.3)
NEUTROPHILS NFR BLD AUTO: 55 %
NRBC # BLD AUTO: 0 10*3/UL
NRBC BLD AUTO-RTO: 0 /100
NT-PROBNP SERPL-MCNC: 55 PG/ML (ref 0–1800)
PLATELET # BLD AUTO: 220 10E9/L (ref 150–450)
POTASSIUM SERPL-SCNC: 3.8 MMOL/L (ref 3.4–5.3)
PROT SERPL-MCNC: 7.4 G/DL (ref 6.8–8.8)
RBC # BLD AUTO: 4.17 10E12/L (ref 3.8–5.2)
SODIUM SERPL-SCNC: 133 MMOL/L (ref 133–144)
TROPONIN I SERPL-MCNC: 0.05 UG/L (ref 0–0.04)
TROPONIN I SERPL-MCNC: 0.06 UG/L (ref 0–0.04)
WBC # BLD AUTO: 6.3 10E9/L (ref 4–11)

## 2019-07-09 PROCEDURE — G0378 HOSPITAL OBSERVATION PER HR: HCPCS

## 2019-07-09 PROCEDURE — 80053 COMPREHEN METABOLIC PANEL: CPT | Performed by: EMERGENCY MEDICINE

## 2019-07-09 PROCEDURE — 25000132 ZZH RX MED GY IP 250 OP 250 PS 637: Performed by: EMERGENCY MEDICINE

## 2019-07-09 PROCEDURE — 99220 ZZC INITIAL OBSERVATION CARE,LEVL III: CPT | Performed by: INTERNAL MEDICINE

## 2019-07-09 PROCEDURE — 36415 COLL VENOUS BLD VENIPUNCTURE: CPT | Performed by: INTERNAL MEDICINE

## 2019-07-09 PROCEDURE — 71046 X-RAY EXAM CHEST 2 VIEWS: CPT

## 2019-07-09 PROCEDURE — 96374 THER/PROPH/DIAG INJ IV PUSH: CPT | Mod: 59

## 2019-07-09 PROCEDURE — 83735 ASSAY OF MAGNESIUM: CPT | Performed by: EMERGENCY MEDICINE

## 2019-07-09 PROCEDURE — 83880 ASSAY OF NATRIURETIC PEPTIDE: CPT | Performed by: EMERGENCY MEDICINE

## 2019-07-09 PROCEDURE — 71260 CT THORAX DX C+: CPT

## 2019-07-09 PROCEDURE — 99285 EMERGENCY DEPT VISIT HI MDM: CPT | Mod: 25

## 2019-07-09 PROCEDURE — 25000132 ZZH RX MED GY IP 250 OP 250 PS 637: Performed by: INTERNAL MEDICINE

## 2019-07-09 PROCEDURE — 25000128 H RX IP 250 OP 636: Performed by: EMERGENCY MEDICINE

## 2019-07-09 PROCEDURE — 84484 ASSAY OF TROPONIN QUANT: CPT | Performed by: EMERGENCY MEDICINE

## 2019-07-09 PROCEDURE — 84484 ASSAY OF TROPONIN QUANT: CPT | Performed by: INTERNAL MEDICINE

## 2019-07-09 PROCEDURE — 85379 FIBRIN DEGRADATION QUANT: CPT | Performed by: EMERGENCY MEDICINE

## 2019-07-09 PROCEDURE — 93970 EXTREMITY STUDY: CPT

## 2019-07-09 PROCEDURE — 93005 ELECTROCARDIOGRAM TRACING: CPT

## 2019-07-09 PROCEDURE — 25000128 H RX IP 250 OP 636: Performed by: INTERNAL MEDICINE

## 2019-07-09 PROCEDURE — 85025 COMPLETE CBC W/AUTO DIFF WBC: CPT | Performed by: EMERGENCY MEDICINE

## 2019-07-09 RX ORDER — ACETAMINOPHEN 650 MG/1
650 SUPPOSITORY RECTAL EVERY 4 HOURS PRN
Status: DISCONTINUED | OUTPATIENT
Start: 2019-07-09 | End: 2019-07-10 | Stop reason: HOSPADM

## 2019-07-09 RX ORDER — CETIRIZINE HYDROCHLORIDE 10 MG/1
10 TABLET ORAL DAILY PRN
Status: DISCONTINUED | OUTPATIENT
Start: 2019-07-09 | End: 2019-07-10 | Stop reason: HOSPADM

## 2019-07-09 RX ORDER — NITROGLYCERIN 0.4 MG/1
0.4 TABLET SUBLINGUAL EVERY 5 MIN PRN
Status: DISCONTINUED | OUTPATIENT
Start: 2019-07-09 | End: 2019-07-10 | Stop reason: HOSPADM

## 2019-07-09 RX ORDER — LIDOCAINE 40 MG/G
CREAM TOPICAL
Status: DISCONTINUED | OUTPATIENT
Start: 2019-07-09 | End: 2019-07-10 | Stop reason: HOSPADM

## 2019-07-09 RX ORDER — METOCLOPRAMIDE 5 MG/1
5 TABLET ORAL EVERY 6 HOURS PRN
Status: DISCONTINUED | OUTPATIENT
Start: 2019-07-09 | End: 2019-07-10 | Stop reason: HOSPADM

## 2019-07-09 RX ORDER — ONDANSETRON 4 MG/1
4 TABLET, ORALLY DISINTEGRATING ORAL EVERY 6 HOURS PRN
Status: DISCONTINUED | OUTPATIENT
Start: 2019-07-09 | End: 2019-07-10 | Stop reason: HOSPADM

## 2019-07-09 RX ORDER — POTASSIUM CL/LIDO/0.9 % NACL 10MEQ/0.1L
10 INTRAVENOUS SOLUTION, PIGGYBACK (ML) INTRAVENOUS
Status: DISCONTINUED | OUTPATIENT
Start: 2019-07-09 | End: 2019-07-10 | Stop reason: HOSPADM

## 2019-07-09 RX ORDER — ONDANSETRON 2 MG/ML
4 INJECTION INTRAMUSCULAR; INTRAVENOUS EVERY 6 HOURS PRN
Status: DISCONTINUED | OUTPATIENT
Start: 2019-07-09 | End: 2019-07-10 | Stop reason: HOSPADM

## 2019-07-09 RX ORDER — ACETAMINOPHEN 325 MG/1
650 TABLET ORAL EVERY 4 HOURS PRN
Status: DISCONTINUED | OUTPATIENT
Start: 2019-07-09 | End: 2019-07-09

## 2019-07-09 RX ORDER — ACETAMINOPHEN 325 MG/1
650 TABLET ORAL EVERY 4 HOURS PRN
Status: DISCONTINUED | OUTPATIENT
Start: 2019-07-09 | End: 2019-07-10 | Stop reason: HOSPADM

## 2019-07-09 RX ORDER — NALOXONE HYDROCHLORIDE 0.4 MG/ML
.1-.4 INJECTION, SOLUTION INTRAMUSCULAR; INTRAVENOUS; SUBCUTANEOUS
Status: DISCONTINUED | OUTPATIENT
Start: 2019-07-09 | End: 2019-07-09

## 2019-07-09 RX ORDER — PROCHLORPERAZINE 25 MG
12.5 SUPPOSITORY, RECTAL RECTAL EVERY 12 HOURS PRN
Status: DISCONTINUED | OUTPATIENT
Start: 2019-07-09 | End: 2019-07-10 | Stop reason: HOSPADM

## 2019-07-09 RX ORDER — POTASSIUM CHLORIDE 1.5 G/1.58G
20-40 POWDER, FOR SOLUTION ORAL
Status: DISCONTINUED | OUTPATIENT
Start: 2019-07-09 | End: 2019-07-10 | Stop reason: HOSPADM

## 2019-07-09 RX ORDER — POTASSIUM CHLORIDE 7.45 MG/ML
10 INJECTION INTRAVENOUS
Status: DISCONTINUED | OUTPATIENT
Start: 2019-07-09 | End: 2019-07-10 | Stop reason: HOSPADM

## 2019-07-09 RX ORDER — ACETAMINOPHEN 650 MG/1
650 SUPPOSITORY RECTAL EVERY 4 HOURS PRN
Status: DISCONTINUED | OUTPATIENT
Start: 2019-07-09 | End: 2019-07-09

## 2019-07-09 RX ORDER — POTASSIUM CHLORIDE 1500 MG/1
20-40 TABLET, EXTENDED RELEASE ORAL
Status: DISCONTINUED | OUTPATIENT
Start: 2019-07-09 | End: 2019-07-10 | Stop reason: HOSPADM

## 2019-07-09 RX ORDER — LORAZEPAM 2 MG/ML
.25-.5 INJECTION INTRAMUSCULAR EVERY 4 HOURS PRN
Status: DISCONTINUED | OUTPATIENT
Start: 2019-07-09 | End: 2019-07-10 | Stop reason: HOSPADM

## 2019-07-09 RX ORDER — LORAZEPAM 0.5 MG/1
0.5 TABLET ORAL ONCE
Status: COMPLETED | OUTPATIENT
Start: 2019-07-09 | End: 2019-07-09

## 2019-07-09 RX ORDER — NALOXONE HYDROCHLORIDE 0.4 MG/ML
.1-.4 INJECTION, SOLUTION INTRAMUSCULAR; INTRAVENOUS; SUBCUTANEOUS
Status: DISCONTINUED | OUTPATIENT
Start: 2019-07-09 | End: 2019-07-10 | Stop reason: HOSPADM

## 2019-07-09 RX ORDER — ALUMINA, MAGNESIA, AND SIMETHICONE 2400; 2400; 240 MG/30ML; MG/30ML; MG/30ML
30 SUSPENSION ORAL EVERY 4 HOURS PRN
Status: DISCONTINUED | OUTPATIENT
Start: 2019-07-09 | End: 2019-07-10 | Stop reason: HOSPADM

## 2019-07-09 RX ORDER — MAGNESIUM SULFATE HEPTAHYDRATE 40 MG/ML
4 INJECTION, SOLUTION INTRAVENOUS EVERY 4 HOURS PRN
Status: DISCONTINUED | OUTPATIENT
Start: 2019-07-09 | End: 2019-07-10 | Stop reason: HOSPADM

## 2019-07-09 RX ORDER — CODEINE PHOSPHATE AND GUAIFENESIN 10; 100 MG/5ML; MG/5ML
1-2 SOLUTION ORAL EVERY 6 HOURS PRN
COMMUNITY

## 2019-07-09 RX ORDER — ASPIRIN 81 MG/1
324 TABLET, CHEWABLE ORAL ONCE
Status: COMPLETED | OUTPATIENT
Start: 2019-07-09 | End: 2019-07-09

## 2019-07-09 RX ORDER — OXYCODONE HYDROCHLORIDE 5 MG/1
5-10 TABLET ORAL
Status: DISCONTINUED | OUTPATIENT
Start: 2019-07-09 | End: 2019-07-10 | Stop reason: HOSPADM

## 2019-07-09 RX ORDER — HYDRALAZINE HYDROCHLORIDE 20 MG/ML
10 INJECTION INTRAMUSCULAR; INTRAVENOUS EVERY 4 HOURS PRN
Status: DISCONTINUED | OUTPATIENT
Start: 2019-07-09 | End: 2019-07-10 | Stop reason: HOSPADM

## 2019-07-09 RX ORDER — ATORVASTATIN CALCIUM 10 MG/1
10 TABLET, FILM COATED ORAL DAILY
Status: DISCONTINUED | OUTPATIENT
Start: 2019-07-10 | End: 2019-07-10 | Stop reason: HOSPADM

## 2019-07-09 RX ORDER — ASPIRIN 81 MG/1
162 TABLET, CHEWABLE ORAL ONCE
Status: DISCONTINUED | OUTPATIENT
Start: 2019-07-09 | End: 2019-07-09

## 2019-07-09 RX ORDER — IOPAMIDOL 755 MG/ML
500 INJECTION, SOLUTION INTRAVASCULAR ONCE
Status: COMPLETED | OUTPATIENT
Start: 2019-07-09 | End: 2019-07-09

## 2019-07-09 RX ORDER — ONDANSETRON 2 MG/ML
4 INJECTION INTRAMUSCULAR; INTRAVENOUS EVERY 6 HOURS PRN
Status: DISCONTINUED | OUTPATIENT
Start: 2019-07-09 | End: 2019-07-09

## 2019-07-09 RX ORDER — ASPIRIN 81 MG/1
81 TABLET ORAL DAILY
Status: DISCONTINUED | OUTPATIENT
Start: 2019-07-10 | End: 2019-07-09

## 2019-07-09 RX ORDER — ACETAMINOPHEN 325 MG/1
325-650 TABLET ORAL EVERY 4 HOURS PRN
Status: DISCONTINUED | OUTPATIENT
Start: 2019-07-09 | End: 2019-07-09

## 2019-07-09 RX ORDER — POTASSIUM CHLORIDE 29.8 MG/ML
20 INJECTION INTRAVENOUS
Status: DISCONTINUED | OUTPATIENT
Start: 2019-07-09 | End: 2019-07-10 | Stop reason: HOSPADM

## 2019-07-09 RX ORDER — PROCHLORPERAZINE MALEATE 5 MG
5 TABLET ORAL EVERY 6 HOURS PRN
Status: DISCONTINUED | OUTPATIENT
Start: 2019-07-09 | End: 2019-07-10 | Stop reason: HOSPADM

## 2019-07-09 RX ORDER — LOSARTAN POTASSIUM 25 MG/1
25 TABLET ORAL DAILY
Status: DISCONTINUED | OUTPATIENT
Start: 2019-07-10 | End: 2019-07-10 | Stop reason: HOSPADM

## 2019-07-09 RX ORDER — ONDANSETRON 4 MG/1
4 TABLET, ORALLY DISINTEGRATING ORAL EVERY 6 HOURS PRN
Status: DISCONTINUED | OUTPATIENT
Start: 2019-07-09 | End: 2019-07-09

## 2019-07-09 RX ORDER — ALBUTEROL SULFATE 90 UG/1
1-2 AEROSOL, METERED RESPIRATORY (INHALATION) EVERY 4 HOURS PRN
Status: DISCONTINUED | OUTPATIENT
Start: 2019-07-09 | End: 2019-07-10 | Stop reason: HOSPADM

## 2019-07-09 RX ORDER — HYDROCHLOROTHIAZIDE 12.5 MG/1
12.5 CAPSULE ORAL DAILY
Status: DISCONTINUED | OUTPATIENT
Start: 2019-07-10 | End: 2019-07-10 | Stop reason: HOSPADM

## 2019-07-09 RX ORDER — ASPIRIN 81 MG/1
81 TABLET ORAL DAILY
Status: DISCONTINUED | OUTPATIENT
Start: 2019-07-10 | End: 2019-07-10 | Stop reason: HOSPADM

## 2019-07-09 RX ORDER — METOCLOPRAMIDE HYDROCHLORIDE 5 MG/ML
5 INJECTION INTRAMUSCULAR; INTRAVENOUS EVERY 6 HOURS PRN
Status: DISCONTINUED | OUTPATIENT
Start: 2019-07-09 | End: 2019-07-10 | Stop reason: HOSPADM

## 2019-07-09 RX ADMIN — ACETAMINOPHEN 650 MG: 650 SUPPOSITORY RECTAL at 22:52

## 2019-07-09 RX ADMIN — HYDRALAZINE HYDROCHLORIDE 10 MG: 20 INJECTION INTRAMUSCULAR; INTRAVENOUS at 21:14

## 2019-07-09 RX ADMIN — LORAZEPAM 0.5 MG: 0.5 TABLET ORAL at 22:51

## 2019-07-09 RX ADMIN — ASPIRIN 81 MG 324 MG: 81 TABLET ORAL at 19:02

## 2019-07-09 RX ADMIN — IOPAMIDOL 56 ML: 755 INJECTION, SOLUTION INTRAVENOUS at 18:31

## 2019-07-09 RX ADMIN — SODIUM CHLORIDE 74 ML: 9 INJECTION, SOLUTION INTRAVENOUS at 18:32

## 2019-07-09 ASSESSMENT — ENCOUNTER SYMPTOMS
MYALGIAS: 1
SHORTNESS OF BREATH: 1
COUGH: 0

## 2019-07-09 ASSESSMENT — MIFFLIN-ST. JEOR
SCORE: 907.27
SCORE: 902.73

## 2019-07-09 NOTE — ED TRIAGE NOTES
Pt having cramping in bilateral legs for a few days.  Pt now having shortness of breath and cough.

## 2019-07-09 NOTE — ED PROVIDER NOTES
History     Chief Complaint:  Shortness of breath    The history is provided by a relative and the patient. The history is limited by a language barrier. A  was used (patient's daughter).      Won Huggins is a 78 year old female,with a history of cancer, cerebral vascular accident, hypertension, and pulmonary embolism, who presents with her daughter and  to the emergency department for evaluation of bilateral leg pain. The patient's daughter reports the patient has been experiencing bilateral leg cramps that radiates upwards with associated shortness of breath. She notes the patient has had shortness of breath with exertion for a while but recently the past couple days prior to evaluation she has been having shortness of breath constantly that worsens with the leg pain. The patient denies any chest pain, increased leg swelling, or new cough. The patient reports right sided chest pain for months that radiates upwards and is worse with movement. The patient's daughter reports the patient has been drinking plenty of fluids.    Allergies:  Hydromorphone  Lisinopril  Oxycodone-Acetaminophen  Prochlorperazine     Medications:    Albuterol  81 mg Aspirin  Lipitor  Erlotinib  Flonase  Hydrochlorothiazide  Losartan  Minocycline  Ambien    Past Medical History:    Tibial fracture  Cancer  CVA  HTN  Allergic rhinitis  Pleural effusion  Vertigo  PE  Chronic insomnia  Osteoporosis  Goiter  Lumbago  Carpal tunnel syndrome  Hepatitis C  UTI    Past Surgical History:    Hysterectomy  Carpal tunnel release  Right lower lobectomy and lymphadenectomy  Colostomy  ORIF left wrist  ORIF left hip  Bilateral cataract removal with implant  Abdomen surgery  Eye surgery    Family History:    Cancer    Social History:  Smoking status: Never  Alcohol use: No  The patient presents to the emergency department with her daughter and .  Marital Status:   [2]     Review of Systems   Respiratory: Positive for  "shortness of breath. Negative for cough.    Cardiovascular: Negative for chest pain and leg swelling.   Musculoskeletal: Positive for myalgias.   All other systems reviewed and are negative.      Physical Exam   Patient Vitals for the past 24 hrs:   BP Temp Pulse Heart Rate Resp SpO2 Height Weight   07/09/19 1910 -- -- -- 61 -- 100 % -- --   07/09/19 1905 200/89 -- 66 62 -- 100 % -- --   07/09/19 1825 -- -- -- 59 -- 100 % -- --   07/09/19 1800 -- -- -- 57 -- 100 % -- --   07/09/19 1755 -- -- -- 60 -- 99 % -- --   07/09/19 1725 -- -- 59 -- -- 100 % -- --   07/09/19 1720 162/81 -- -- -- -- -- -- --   07/09/19 1610 140/62 98.3  F (36.8  C) 63 -- 28 100 % 1.448 m (4' 9\") 55.3 kg (122 lb)     Physical Exam  General: Elderly female sitting upright  Eyes: PERRL, Conjunctive within normal limits  ENT: Moist mucous membranes, oropharynx clear.   CV: Normal S1S2, no murmur, rub or gallop. Regular rate and rhythm  Resp: Clear to auscultation bilaterally, no wheezes, rales or rhonchi. Normal respiratory effort.  GI: Abdomen is soft, nontender and nondistended. No palpable masses. No rebound or guarding.  MSK: Mild tenderness over the right lower rib margin just below the right breast without mass, crepitus or overlying skin abnormality. Nonpitting left lower extremity edema. Nontender. Normal active range of motion.  Skin: Warm and dry. No rashes or lesions or ecchymoses on visible skin.  Neuro: Alert and oriented. Responds appropriately to all questions and commands. No focal findings appreciated. Normal muscle tone.  Psych: Normal mood and affect.     Emergency Department Course   ECG (16:06:38):  Rate 63 bpm. IA interval 162. QRS duration 74. QT/QTc 434/444. P-R-T axes 42 -47 34. Normal sinus rhythm. Left axis deviation. Abnormal ECG. No significant change when compared to EKG dated 11/2/17. Interpreted at 1619 by Yandy Jackson MD.    Imaging:  Radiographic findings were communicated with the patient and family who " voiced understanding of the findings.    Chest XR, PA & LAT  IMPRESSION:   1. No active infiltrates. No gross congestive heart failure.  2. Multiple age indeterminant compression fractures of lower thoracic  vertebrae.      As read by Radiology.    US Lower Extremity Venous Duplex Bilateral  IMPRESSION: There is no evidence of deep venous thrombosis within the  lower extremities bilaterally.  As read by Radiology.    CT Chest Pulmonary Embolism w Contrast  IMPRESSION:  1. No evidence for pulmonary embolism.  2. Vascular calcifications, including coronary artery calcifications.  3. Stable ectasia ascending thoracic aorta.  4. Stable thickening medial limb of left adrenal gland.  5. Multiple chronic-appearing vertebral compression deformities.  As read by Radiology.    Laboratory:  CBC: AWNL (WBC 6.3, HGB 12.8, )  CMP: Glucose 127 (H), Albumin 3.1 (L) o/w WNL (Creatinine 0.72)    BNP: 55  Troponin I (1628): 0.047 (H)  D dimer: 0.6 (H)  Magnesium: 2.0    Interventions:  1902 Aspirin 324 mg PO    Emergency Department Course:  Past medical records, nursing notes, and vitals reviewed.  1616: I performed an exam of the patient and obtained history, as documented above.     IV inserted and blood drawn.    The patient was sent for a chest x-ray and lower extremity ultrasound while in the emergency department, findings above.    Patient reassessed. She denies any new concerns.     The patient was sent for a chest CT, findings above. Details of examination discussed with patient.     1914 Findings and plan explained to the Patient and spouse and daughter who consents to admission.     1933: Discussed the patient with Dr. Meeks, who will admit the patient to an observation bed for further monitoring, evaluation, and treatment.   Impression & Plan    Medical Decision Making:  Won Huggins is a 78 year old female who presents to the emergency department with concerns for dyspnea with associated leg cramping. She was also  noting right sided lower chest pain which was somewhat reproducible on exam with movement. Multiple etiologies considered for her symptoms including CHF, ACS, pneumonia, bronchitis, pulmonary embolism, pleural effusion, etc. Ultimately there is no clear cause for her symptoms. Electrolytes are normal. Troponin was slightly elevated concerning for possible cardiac involvement, although she had no signs of acute ischemia or injury on EKG. There is no evidence of pulmonary embolism, so this seems less likely as a cause for right heart strain. This troponin elevation may be unrelated to ischemia but should be trended out of her time. She was given aspirin here. She will be admitted for serial troponins and telemetry monitoring on observation and further care. Discussed plan of care and she voiced understanding. All questions answered prior to admission.    Diagnosis:    ICD-10-CM   1. Dyspnea, unspecified type R06.00   2. Right-sided chest pain R07.9   3. Elevated troponin R74.8     Disposition:  Admitted to an observation telemetry bed.  Stacy Hutchinson  7/9/2019   [facility] EMERGENCY DEPARTMENT  Scribe Disclosure:  I, Stacy Hutchinson, am serving as a scribe at 4:16 PM on 7/9/2019 to document services personally performed by Yandy Jackson MD based on my observations and the provider's statements to me.      Yandy Jackson MD  07/09/19 2031

## 2019-07-10 ENCOUNTER — APPOINTMENT (OUTPATIENT)
Dept: NUCLEAR MEDICINE | Facility: CLINIC | Age: 79
End: 2019-07-10
Attending: HOSPITALIST
Payer: COMMERCIAL

## 2019-07-10 VITALS
WEIGHT: 124.5 LBS | DIASTOLIC BLOOD PRESSURE: 60 MMHG | HEIGHT: 56 IN | HEART RATE: 63 BPM | OXYGEN SATURATION: 100 % | RESPIRATION RATE: 16 BRPM | SYSTOLIC BLOOD PRESSURE: 120 MMHG | TEMPERATURE: 96.6 F | BODY MASS INDEX: 28.01 KG/M2

## 2019-07-10 LAB
ANION GAP SERPL CALCULATED.3IONS-SCNC: 5 MMOL/L (ref 3–14)
BUN SERPL-MCNC: 20 MG/DL (ref 7–30)
CALCIUM SERPL-MCNC: 8.9 MG/DL (ref 8.5–10.1)
CHLORIDE SERPL-SCNC: 104 MMOL/L (ref 94–109)
CO2 SERPL-SCNC: 29 MMOL/L (ref 20–32)
CREAT SERPL-MCNC: 0.63 MG/DL (ref 0.52–1.04)
ERYTHROCYTE [DISTWIDTH] IN BLOOD BY AUTOMATED COUNT: 12.5 % (ref 10–15)
GFR SERPL CREATININE-BSD FRML MDRD: 85 ML/MIN/{1.73_M2}
GLUCOSE SERPL-MCNC: 109 MG/DL (ref 70–99)
HCT VFR BLD AUTO: 38.4 % (ref 35–47)
HGB BLD-MCNC: 12.5 G/DL (ref 11.7–15.7)
INTERPRETATION ECG - MUSE: NORMAL
MCH RBC QN AUTO: 30.3 PG (ref 26.5–33)
MCHC RBC AUTO-ENTMCNC: 32.6 G/DL (ref 31.5–36.5)
MCV RBC AUTO: 93 FL (ref 78–100)
PLATELET # BLD AUTO: 214 10E9/L (ref 150–450)
POTASSIUM SERPL-SCNC: 3.7 MMOL/L (ref 3.4–5.3)
RBC # BLD AUTO: 4.13 10E12/L (ref 3.8–5.2)
SODIUM SERPL-SCNC: 138 MMOL/L (ref 133–144)
TROPONIN I SERPL-MCNC: 0.08 UG/L (ref 0–0.04)
TROPONIN I SERPL-MCNC: 0.09 UG/L (ref 0–0.04)
WBC # BLD AUTO: 7.2 10E9/L (ref 4–11)

## 2019-07-10 PROCEDURE — 25000128 H RX IP 250 OP 636

## 2019-07-10 PROCEDURE — G0378 HOSPITAL OBSERVATION PER HR: HCPCS

## 2019-07-10 PROCEDURE — 93017 CV STRESS TEST TRACING ONLY: CPT

## 2019-07-10 PROCEDURE — 34300033 ZZH RX 343: Performed by: INTERNAL MEDICINE

## 2019-07-10 PROCEDURE — 78452 HT MUSCLE IMAGE SPECT MULT: CPT | Mod: 26 | Performed by: INTERNAL MEDICINE

## 2019-07-10 PROCEDURE — 93016 CV STRESS TEST SUPVJ ONLY: CPT | Performed by: INTERNAL MEDICINE

## 2019-07-10 PROCEDURE — 80048 BASIC METABOLIC PNL TOTAL CA: CPT | Performed by: INTERNAL MEDICINE

## 2019-07-10 PROCEDURE — A9502 TC99M TETROFOSMIN: HCPCS | Performed by: INTERNAL MEDICINE

## 2019-07-10 PROCEDURE — 25000132 ZZH RX MED GY IP 250 OP 250 PS 637: Performed by: INTERNAL MEDICINE

## 2019-07-10 PROCEDURE — 36415 COLL VENOUS BLD VENIPUNCTURE: CPT | Performed by: INTERNAL MEDICINE

## 2019-07-10 PROCEDURE — 99217 ZZC OBSERVATION CARE DISCHARGE: CPT | Performed by: HOSPITALIST

## 2019-07-10 PROCEDURE — 93018 CV STRESS TEST I&R ONLY: CPT | Performed by: INTERNAL MEDICINE

## 2019-07-10 PROCEDURE — 85027 COMPLETE CBC AUTOMATED: CPT | Performed by: INTERNAL MEDICINE

## 2019-07-10 PROCEDURE — 84484 ASSAY OF TROPONIN QUANT: CPT | Performed by: INTERNAL MEDICINE

## 2019-07-10 PROCEDURE — 78452 HT MUSCLE IMAGE SPECT MULT: CPT

## 2019-07-10 RX ORDER — REGADENOSON 0.08 MG/ML
INJECTION, SOLUTION INTRAVENOUS
Status: COMPLETED
Start: 2019-07-10 | End: 2019-07-10

## 2019-07-10 RX ADMIN — TETROFOSMIN 11 MCI.: 1.38 INJECTION, POWDER, LYOPHILIZED, FOR SOLUTION INTRAVENOUS at 11:25

## 2019-07-10 RX ADMIN — TETROFOSMIN 33 MCI.: 1.38 INJECTION, POWDER, LYOPHILIZED, FOR SOLUTION INTRAVENOUS at 13:16

## 2019-07-10 RX ADMIN — LOSARTAN POTASSIUM 25 MG: 25 TABLET, FILM COATED ORAL at 10:14

## 2019-07-10 RX ADMIN — ASPIRIN 81 MG: 81 TABLET, COATED ORAL at 10:14

## 2019-07-10 RX ADMIN — REGADENOSON 0.4 MG: 0.08 INJECTION, SOLUTION INTRAVENOUS at 13:14

## 2019-07-10 RX ADMIN — ATORVASTATIN CALCIUM 10 MG: 10 TABLET, FILM COATED ORAL at 10:15

## 2019-07-10 RX ADMIN — HYDROCHLOROTHIAZIDE 12.5 MG: 12.5 CAPSULE ORAL at 10:14

## 2019-07-10 NOTE — PLAN OF CARE
PRIMARY DIAGNOSIS: CHEST PAIN/SOB  OUTPATIENT/OBSERVATION GOALS TO BE MET BEFORE DISCHARGE:  1. Ruled out acute coronary syndrome (negative or stable Troponin):  elevated trop, orders to continue to trend and monitor patient.    2. Pain Status: Pain free.  3. Appropriate provocative testing performed: N/A  - Stress Test Procedure: [place]an  - Interpretation of cardiac rhythm per telemetry tech: NSr    4. Cleared by Consultants (if applicable):N/A  5. Return to near baseline physical activity: Yes  Discharge Planner Nurse   Safe discharge environment identified: Yes  Barriers to discharge: No       Entered by: Peggy Coyle 07/09/2019 10:54 PM     Please review provider order for any additional goals.   Nurse to notify provider when observation goals have been met and patient is ready for discharge.    Patient has chronic lymphedema in left leg. Patient trop trending upwards, MD aware, continue to monitor. Patient given hydralazine for HTN, positive results. Patient given ativan for what seemed to be a panic attack, patient increased work of breathing, vitals stable. MD aware and ordered ativan. Daughter at bedside.

## 2019-07-10 NOTE — PLAN OF CARE
PRIMARY DIAGNOSIS: SOB  OUTPATIENT/OBSERVATION GOALS TO BE MET BEFORE DISCHARGE:  1. Vital signs stable: Yes    3. Dyspnea improved and O2 sats >88% at RA or at prior home O2 therapy level: Yes      SpO2: 100 %, O2 Device: None (Room air)    4. Short term supplemental O2 needed for use with activity at home: No    5. Tolerating adequate PO diet and medications: NPO, ice chips     6. Return to near baseline physical activity: Yes    Discharge Planner Nurse   Safe discharge environment identified: Yes  Barriers to discharge: Yes       Entered by: Alta Pham 07/10/2019 2:18 PM     Please review provider order for any additional goals.   Nurse to notify provider when observation goals have been met and patient is ready for discharge.    VSS, on RA, afebrile. Up SBA-Ax1 with walker. Daughter at bedside, supportive. Pt on 2L of O2 overnight via oxymask. Pt was weaned to room air this morning, 100% on RA. Pt does not wear oxygen at home.  here from 8-noon. NPO, ice chips. Per [employer] tech NSR. Denies nausea, sob, dizziness, chest pain. Plan- ambulate in [facility] this afternoon.

## 2019-07-10 NOTE — PLAN OF CARE
PRIMARY DIAGNOSIS: CHEST PAIN/SOB  OUTPATIENT/OBSERVATION GOALS TO BE MET BEFORE DISCHARGE:  1. Ruled out acute coronary syndrome (negative or stable Troponin):  No, continue serial trops  2. Pain Status: Pain free.  3. Appropriate provocative testing performed: No  - Stress Test Procedure: Echo  - Interpretation of cardiac rhythm per telemetry tech: NSR    4. Cleared by Consultants (if applicable):N/A  5. Return to near baseline physical activity: Yes  Discharge Planner Nurse   Safe discharge environment identified: Yes  Barriers to discharge: Yes       Entered by: Janeen Nielsen 07/10/2019 5:53 AM     Please review provider order for any additional goals.   Nurse to notify provider when observation goals have been met and patient is ready for discharge.    VS: WDL,   Orientation: WDL,  ordered for 8am  Tele:NSR  Glucose checks: NA  Activity: SBA walker  Diet: low Na  GI: WDL  : WDL  Respiratory: 2L oxymask  IV: SL  Plan: exercise stress test today

## 2019-07-10 NOTE — PHARMACY-ADMISSION MEDICATION HISTORY
Admission medication history interview status for this patient is complete. See Psychiatric admission navigator for allergy information, prior to admission medications and immunization status.     Medication history interview source(s):Patient, daughter translated  Medication history resources (including written lists, pill bottles, clinic record):Sharifa  [facility] [address] [place], [place] - [address] [facility]    Changes made to PTA medication list:  Added: Virtussin AC syrup  Deleted: minocycline, lexomil, miralax, zolpidem  Changed: none    Actions taken by pharmacist (provider contacted, etc):None     Additional medication history information: Patient was unsure about if she was taking losartan, but fill records show she picked up a 90 day supply on 5/8/19.    Medication reconciliation/reorder completed by provider prior to medication history? No    Do you take OTC medications (eg tylenol, ibuprofen, fish oil, eye/ear drops, etc)? Yes, see list below    Prior to Admission medications    Medication Sig Last Dose Taking? Auth Provider   Acetaminophen (TYLENOL PO) Take 325-650 mg by mouth every 4 hours as needed for mild pain or fever 7/9/2019 at am Yes Unknown, Entered By History   albuterol (PROAIR HFA/PROVENTIL HFA/VENTOLIN HFA) 108 (90 BASE) MCG/ACT Inhaler Inhale 1-2 puffs into the lungs every 4 hours as needed for shortness of breath / dyspnea or wheezing 7/9/2019 at am Yes Unknown, Entered By History   ASPIRIN EC PO Take 81 mg by mouth daily 7/9/2019 at am Yes Unknown, Entered By History   atorvastatin (LIPITOR) 10 MG tablet Take 1 tablet (10 mg) by mouth daily 7/9/2019 at am Yes Beka Flores MD   cetirizine (ZYRTEC) 10 MG tablet Take 10 mg by mouth daily as needed  7/9/2019 at am Yes Unknown, Entered By History   erlotinib (TARCEVA) 100 MG TABS Take 100 mg by mouth every other day  7/9/2019 at am Yes Unknown, Entered By History   fish oil-omega-3 fatty acids  1000 MG capsule Take 1 g by mouth daily 7/9/2019 at am Yes Unknown, Entered By History   fluticasone (FLONASE) 50 MCG/ACT spray Spray 2 sprays into both nostrils daily as needed for rhinitis or allergies 7/9/2019 at am Yes Unknown, Entered By History   Ginsengs-Royal Jelly-Vit B12 (GINSENG ROYAL JELLY PLUS PO) Take by mouth as needed Chinese OTC med 7/9/2019 at am Yes Unknown, Entered By History   hydrochlorothiazide (MICROZIDE) 12.5 MG capsule Take 12.5 mg by mouth daily  7/9/2019 at am Yes Reported, Patient   LOSARTAN POTASSIUM PO Take 25 mg by mouth daily  7/9/2019 at am Yes Reported, Patient   VITAMIN D, CHOLECALCIFEROL, PO Take by mouth daily Unknown OTC medication for bone health 7/9/2019 at am Yes Unknown, Entered By History   diclofenac (VOLTAREN) 1 % GEL topical gel Place 2 g onto the skin 4 times daily More than a month at prn  Beka Flores MD   guaiFENesin-codeine (ROBITUSSIN AC) 100-10 MG/5ML solution Take 1-2 tsp. by mouth every 6 hours as needed for cough More than a month at Unknown time  Unknown, Entered By History   Lidocaine-Menthol 3.99-1 % PTCH Externally apply 1 patch topically daily for 10 days   Beka Flores MD

## 2019-07-10 NOTE — PLAN OF CARE
PRIMARY DIAGNOSIS: CHEST PAIN/SOB  OUTPATIENT/OBSERVATION GOALS TO BE MET BEFORE DISCHARGE:  1. Ruled out acute coronary syndrome (negative or stable Troponin):  No, continue serial trop  2. Pain Status: Improved-controlled with oral pain medications.  3. Appropriate provocative testing performed: No  - Stress Test Procedure: Echo exercise stress  - Interpretation of cardiac rhythm per telemetry tech: NSR    4. Cleared by Consultants (if applicable):N/A  5. Return to near baseline physical activity: Yes  Discharge Planner Nurse   Safe discharge environment identified: Yes  Barriers to discharge: Yes       Entered by: Janeen Nielsen 07/10/2019 12:36 AM     Please review provider order for any additional goals.   Nurse to notify provider when observation goals have been met and patient is ready for discharge.

## 2019-07-10 NOTE — PLAN OF CARE
Patient's After Visit Summary was reviewed with patient and grand daughter who is acting as interpretor, as patient speaks no english  Patient verbalized understanding of After Visit Summary, recommended follow up and was given an opportunity to ask questions.   Discharge medications sent home with patient/family: no  Discharged with grand daughter    OBSERVATION patient END time: 16:00 PM

## 2019-07-10 NOTE — PLAN OF CARE
ROOM # 203    Living Situation (if not independent, order SW consult): home with daughter  Facility name:  : Daughter     Activity level at baseline: IND with walker   Activity level on admit: IND with walker       Patient registered to observation; given Patient Bill of Rights; given the opportunity to ask questions about observation status and their plan of care.  Patient has been oriented to the observation room, bathroom and call light is in place.    Discussed discharge goals and expectations with patient/family.

## 2019-07-10 NOTE — PLAN OF CARE
PRIMARY DIAGNOSIS: SOB  OUTPATIENT/OBSERVATION GOALS TO BE MET BEFORE DISCHARGE:  1. Vital signs stable: Yes     3. Dyspnea improved and O2 sats >88% at RA or at prior home O2 therapy level: Yes      SpO2: 100 %, O2 Device: None (Room air)     4. Short term supplemental O2 needed for use with activity at home: No     5. Tolerating adequate PO diet and medications: NPO, ice chips      6. Return to near baseline physical activity: Yes     Discharge Planner Nurse   Safe discharge environment identified: Yes  Barriers to discharge: Yes       Entered by: Alta Pham 07/10/2019 2:18 PM  Please review provider order for any additional goals.   Nurse to notify provider when observation goals have been met and patient is ready for discharge.     VSS, on RA, afebrile. Up SBA-Ax1 with walker. Daughter at bedside, supportive and has been translating for her, Pashto speaking. Pt was weaned to room air this morning, 100% on RA.  here from 8-noon. NPO, ice chips. Per tele tech NSR. Tele off this afternoon as pt has been at a [employer]. Denies nausea, sob, dizziness, chest pain. Ambulated in hallway, tolerated well. Colostomy bag intact, pt cares for bag on her own. Plan- lexiscan this afternoon.

## 2019-07-10 NOTE — PROGRESS NOTES
Pt walked in the white with walker and gait belt and light assist A1 400 ft,pt did vary well O2 sat is 100% after walk.

## 2019-07-10 NOTE — DISCHARGE SUMMARY
[facility]  Hospitalist Discharge Summary       Date of Admission:  7/9/2019  Date of Discharge:  7/10/2019  Discharging Provider: Gee Jo MD      Discharge Diagnoses   Shortness of breath, leg cramps    Follow-ups Needed After Discharge   Follow-up Appointments     Follow-up and recommended labs and tests       Follow up with primary care provider, Kayla Delgadillo, within 7 days for   hospital follow- up.  No follow up labs or test are needed.             Unresulted Labs Ordered in the Past 30 Days of this Admission     No orders found for last 61 day(s).      These results will be followed up by NA    Discharge Disposition   Discharged to home  Condition at discharge: Stable    Hospital Course   Brief summary of admission assessment:Won Huggins is a 78 year old [place]  female with a significant past medical history of hypertension, malignancy(adenocarcinoma of the lung, anal cancer with colostomy) , pulmonary embolism, left lower extremity lymphedema, chronic compression fractures of T11 due to osteoporosis and stroke who presents with complaint of shortness of breath for the last 3 days.  She was initially seen at her primary care clinic in [place] and she was referred to [facility] for evaluation for pulmonary embolism given her bilateral leg cramps as well.     Here in the emergency department patient was found to have slightly elevated troponin otherwise no ischemic EKG changes.  CT scan of the chest showed no evidence of pulmonary embolism but she has vascular calcifications and stable ectasia of ascending thoracic aorta and a chronic appearing vertebral compression deformities.     Patient was then admitted to our service for further monitoring and evaluation  Patient was admitted to Obs. Her trops were slightly elevated for unclear reason. Stress test was done and was negative for ischemia. Today she is feeling better. No sob. No leg cramps or SOB. Daughter states she  feels her mom's symptoms were related to anxiety. She will discharge home.    Consultations This Hospital Stay   None    Code Status   Full Code    Time Spent on this Encounter   I, Gee Jo, personally saw the patient today and spent greater than 30 minutes discharging this patient.       Gee Jo MD  [facility]  ______________________________________________________________________    Physical Exam   Vital Signs: Temp: 96.6  F (35.9  C) Temp src: Oral BP: 127/61 Pulse: 63 Heart Rate: 67 Resp: 16 SpO2: 100 % O2 Device: None (Room air) Oxygen Delivery: 1 LPM  Weight: 124 lbs 8 oz  Constitutional: awake, alert, cooperative, no apparent distress, and appears stated age  ENT: Normocephalic, without obvious abnormality, atraumatic, sinuses nontender on palpation, external ears without lesions, oral pharynx with moist mucous membranes, tonsils without erythema or exudates, gums normal and good dentition.  Respiratory: No increased work of breathing, good air exchange, clear to auscultation bilaterally, no crackles or wheezing  Cardiovascular: Normal apical impulse, regular rate and rhythm, normal S1 and S2, no S3 or S4, and no murmur noted  GI: No scars, normal bowel sounds, soft, non-distended, non-tender, no masses palpated, no hepatosplenomegally  Skin: no bruising or bleeding       Primary Care Physician   Kayla Delgadillo    Discharge Orders      Reason for your hospital stay    Shortness of breath. Unclear cause. May be secondary to anxiety. You labs, EKG, CT scan and stress test all looked unrevealing.     Follow-up and recommended labs and tests     Follow up with primary care provider, Kayla Delgadillo, within 7 days for hospital follow- up.  No follow up labs or test are needed.     Activity    Your activity upon discharge: activity as tolerated     Diet    Follow this diet upon discharge: regular diet       Significant Results and Procedures   Most Recent 3 CBC's:  Recent Labs   Lab Test  07/10/19  0442 07/09/19  1628 05/15/19  1446   WBC 7.2 6.3 7.3   HGB 12.5 12.8 13.8   MCV 93 92 93    220 192     Most Recent 3 BMP's:  Recent Labs   Lab Test 07/10/19  0442 07/09/19  1628 05/15/19  1446    133 134   POTASSIUM 3.7 3.8 3.4   CHLORIDE 104 97 96   CO2 29 28 32   BUN 20 17 16   CR 0.63 0.72 0.63   ANIONGAP 5 8 6   STEPHEN 8.9 9.4 9.0   * 127* 102*     Most Recent 2 LFT's:  Recent Labs   Lab Test 07/09/19  1628 05/15/19  1446   AST 32 37   ALT 36 35   ALKPHOS 46 48   BILITOTAL 0.5 0.4   ,   Results for orders placed or performed during the hospital encounter of 07/09/19   Chest XR,  PA & LAT    Narrative    XR CHEST 2 VW   7/9/2019 5:18 PM     HISTORY: dyspnea    COMPARISON: Film dated 9/3/2015    FINDINGS: Cardiac silhouette is at the upper limits of normal.  The  lungs are clear. The pulmonary vasculature is normal.  There are  compression fractures of multiple lower thoracic vertebrae. Appear new  when compared to 2015..      Impression    IMPRESSION:   1. No active infiltrates. No gross congestive heart failure.  2. Multiple age indeterminant compression fractures of lower thoracic  vertebrae.        ELIZABETH FAIR MD   US Lower Extremity Venous Duplex Bilateral    Narrative    BILATERAL LOWER EXTREMITY VENOUS DOPPLER ULTRASOUND  7/9/2019 5:16 PM    HISTORY: Bilateral leg pain. The concern is for deep venous  thrombosis.    COMPARISON: An ultrasound on 5/23/2017.    FINDINGS: Color flow and Doppler spectral waveform analysis was  performed of the common femoral, femoral, popliteal, posterior tibial,  and greater saphenous veins of the lower extremities bilaterally. No  thrombus is seen. I see no definite change since the previous  examination.      Impression    IMPRESSION: There is no evidence of deep venous thrombosis within the  lower extremities bilaterally.    JOCELYNN STEIN MD   CT Chest Pulmonary Embolism w Contrast    Narrative    CT CHEST PULMONARY EMBOLISM WITH CONTRAST   7/9/2019 6:43 PM     HISTORY: Dyspnea and right lower chest pain. Evaluate for pulmonary  embolism.    TECHNIQUE: Helical axial scans from lung apices through lung bases  with 56mL Isovue-370 IV contrast. Radiation dose for this scan was  reduced using automated exposure control, adjustment of the mA and/or  kV according to patient size, or iterative reconstruction technique.    COMPARISON: 11/2/2017.    FINDINGS: There is no CT evidence for pulmonary embolism or other  acute vascular abnormality of the chest. Mild ascending thoracic  aortic ectasia without change. Vascular calcifications, including  coronary artery calcifications. The mediastinal and hilar structures  are otherwise unremarkable and unchanged. The lungs remain clear  bilaterally. Visualized upper abdomen again shows mild diffuse  thickening of the medial limb of left adrenal gland, but this is  stable and of doubtful significance.      Impression    IMPRESSION:  1. No evidence for pulmonary embolism.  2. Vascular calcifications, including coronary artery calcifications.  3. Stable ectasia ascending thoracic aorta.  4. Stable thickening medial limb of left adrenal gland.  5. Multiple chronic-appearing vertebral compression deformities.    MATY SMITH MD   NM MPI w Lexiscan    Narrative    GATED MYOCARDIAL PERFUSION SCINTIGRAPHY WITH INTRAVENOUS PHARMACOLOGIC  VASODILATATION LEXISCAN -ONE DAY STUDY     7/10/2019 2:49 PM  TE HENLEY  78 years  Female  1940.    Indication/Clinical History: Shortness of breath    Impression  1.  Myocardial perfusion imaging using single isotope technique  demonstrated no evidence for ischemia or infarction.   2. Gated images demonstrated normal wall motion and thickening.  The  left ventricular systolic function is normal with an ejection fraction  of 96%.  3. Compared to the prior study from dictated 4/10/2017, there is been  no significant change .    Procedure  Pharmacologic stress testing was performed with  Lexiscan at a rate of  0.08 mg/ml rapid bolus injection, for 15 seconds, 0.4 mg/5ml  intravenously. Low-level exercise was not performed along with the  vasodilator infusion.  The heart rate was 71 at baseline and ivan to  88 beats per minute during the Lexiscan infusion. The rest blood  pressure was 180/79 mmHg and was 120/62 mm Hg during Lexiscan  infusion. The patient experienced shortness of breath  during the  test.    Myocardial perfusion imaging was performed at rest, approximately 45  minutes after the injection intravenously of 11.0 mCi of Tc-99m  Myoview. At peak pharmacologic effect, 10-20 seconds after Lexiscan,   the patient was injected intravenously with 33.0 mCi of  Tc-99m  Myoview. The post-stress tomographic imaging was performed  approximately 60 minutes after stress.    EKG Findings  The resting EKG demonstrated normal sinus rhythm with no diagnostic ST  segment changes. The stress EKG demonstrated normal sinus rhythm with  no diagnostic ST segment changes.    Tomographic Findings  Overall, the study quality is excellent . On the stress images, normal  tracer uptake. On the rest images, normal tracer . Gated images  demonstrated normal wall motion thickening. The left ventricular  ejection fraction was calculated to be 96%. TID was the present.    RANDY TIAN MD       Discharge Medications   Current Discharge Medication List      CONTINUE these medications which have NOT CHANGED    Details   Acetaminophen (TYLENOL PO) Take 325-650 mg by mouth every 4 hours as needed for mild pain or fever      albuterol (PROAIR HFA/PROVENTIL HFA/VENTOLIN HFA) 108 (90 BASE) MCG/ACT Inhaler Inhale 1-2 puffs into the lungs every 4 hours as needed for shortness of breath / dyspnea or wheezing      ASPIRIN EC PO Take 81 mg by mouth daily      atorvastatin (LIPITOR) 10 MG tablet Take 1 tablet (10 mg) by mouth daily  Qty: 30 tablet, Refills: 0    Associated Diagnoses: NSTEMI (non-ST elevated myocardial  infarction) (H)      cetirizine (ZYRTEC) 10 MG tablet Take 10 mg by mouth daily as needed       erlotinib (TARCEVA) 100 MG TABS Take 100 mg by mouth every other day       fish oil-omega-3 fatty acids 1000 MG capsule Take 1 g by mouth daily      fluticasone (FLONASE) 50 MCG/ACT spray Spray 2 sprays into both nostrils daily as needed for rhinitis or allergies      Ginsengs-Royal Jelly-Vit B12 (GINSENG ROYAL JELLY PLUS PO) Take by mouth as needed Chinese OTC med      hydrochlorothiazide (MICROZIDE) 12.5 MG capsule Take 12.5 mg by mouth daily       LOSARTAN POTASSIUM PO Take 25 mg by mouth daily       VITAMIN D, CHOLECALCIFEROL, PO Take by mouth daily Unknown OTC medication for bone health      diclofenac (VOLTAREN) 1 % GEL topical gel Place 2 g onto the skin 4 times daily  Qty: 120 g, Refills: 0    Associated Diagnoses: Chest pain on breathing      guaiFENesin-codeine (ROBITUSSIN AC) 100-10 MG/5ML solution Take 1-2 tsp. by mouth every 6 hours as needed for cough      Lidocaine-Menthol 3.99-1 % PTCH Externally apply topically daily as needed           Allergies   No Known Allergies

## 2019-07-10 NOTE — ED NOTES
[facility]  ED Nurse Handoff Report    Won Huggins is a 78 year old female   ED Chief complaint: Shortness of Breath and Leg Pain  . ED Diagnosis:   Final diagnoses:   Dyspnea, unspecified type   Right-sided chest pain     Allergies: No Known Allergies    Code Status: Full Code  Activity level - Baseline/Home:  Stand by Assist. Activity Level - Current:   Stand by Assist. Lift room needed: No. Bariatric: No   Needed: Yes  Isolation: No. Infection: Not Applicable.     Vital Signs:   Vitals:    07/09/19 1800 07/09/19 1825 07/09/19 1905 07/09/19 1910   BP:   200/89    Pulse:   66    Resp:       Temp:       SpO2: 100% 100% 100% 100%   Weight:       Height:           Cardiac Rhythm:  ,   Cardiac  Cardiac Rhythm: Normal sinus rhythm  Pain level: 0-10 Pain Scale: 7  Patient confused: No. Patient Falls Risk: Yes.   Elimination Status: Has voided   Patient Report - Initial Complaint: SOB and leg pain. Focused Assessment:   Cardiac - Cardiac WDL: chest pain; -WDL except  Capillary Refill, General: less than/equal to 3 secs   Review of Systems (Cardiac) - Cardiovascular Symptoms/Conditions: chest pain   Chest Pain Assessment - Rating (0-10): 0  Pt denies chest pain at this time  Cardiac Monitoring - EKG Monitoring: Yes  Cardiac Regularity: Regular  Cardiac Rhythm: NSR   Respiratory - Respiratory WDL: -WDL except; breath sounds; cough  Breath Sounds: All Fields  Cough Frequency: infrequent   Suction - Signs of Intolerance (Suction): work of breathing increased   Head To Toe Assessment - All Lung Fields Breath Sounds: clear; equal bilaterally   Tests Performed:   Labs Ordered and Resulted from Time of ED Arrival Up to the Time of Departure from the ED   COMPREHENSIVE METABOLIC PANEL - Abnormal; Notable for the following components:       Result Value    Glucose 127 (*)     Albumin 3.1 (*)     All other components within normal limits   TROPONIN I - Abnormal; Notable for the following components:    Troponin I  ES 0.047 (*)     All other components within normal limits   D DIMER QUANTITATIVE - Abnormal; Notable for the following components:    D Dimer 0.6 (*)     All other components within normal limits   CBC WITH PLATELETS DIFFERENTIAL   MAGNESIUM   NT PROBNP INPATIENT   PERIPHERAL IV CATHETER       Treatments provided:   Medications   lidocaine 1 % 0.1-1 mL (has no administration in time range)   lidocaine (LMX4) cream (has no administration in time range)   sodium chloride (PF) 0.9% PF flush 3 mL (has no administration in time range)   sodium chloride (PF) 0.9% PF flush 3 mL (has no administration in time range)   0.9% sodium chloride BOLUS (0 mLs Intravenous Stopped 7/9/19 1837)   iopamidol (ISOVUE-370) solution 500 mL (56 mLs Intravenous Given 7/9/19 1831)   aspirin (ASA) chewable tablet 324 mg (324 mg Oral Given 7/9/19 1902)       Family Comments: With daughter (helps with interpretation)  OBS brochure/video discussed/provided to patient:  Yes  ED Medications:   Medications   lidocaine 1 % 0.1-1 mL (has no administration in time range)   lidocaine (LMX4) cream (has no administration in time range)   sodium chloride (PF) 0.9% PF flush 3 mL (has no administration in time range)   sodium chloride (PF) 0.9% PF flush 3 mL (has no administration in time range)   0.9% sodium chloride BOLUS (0 mLs Intravenous Stopped 7/9/19 1837)   iopamidol (ISOVUE-370) solution 500 mL (56 mLs Intravenous Given 7/9/19 1831)   aspirin (ASA) chewable tablet 324 mg (324 mg Oral Given 7/9/19 1902)     Drips infusing:  No  For the majority of the shift, the patient's behavior Green. Interventions performed were N/A.     Severe Sepsis OR Septic Shock Diagnosis Present: No      ED Nurse Name/Phone Number: Kris Ernst,   7:13 PM    RECEIVING UNIT ED HANDOFF REVIEW    Above ED Nurse Handoff Report was reviewed: Yes  Reviewed by: Peggy Coyle on July 9, 2019 at 8:32 PM

## 2019-07-10 NOTE — PROGRESS NOTES
Pre-procedure:  Are you having any pain or shortness of breath (prior to starting)? no  Initial vital signs: /79, HR 88, RR 16  Allergies reviewed: yes   Rhythm:   Medications taken within 48 hours of procedure:    Any nitrates within the last 48 hours:  Last Caffeine:   Lung sounds: CTA, no wheezing, crackles or rtx  Health History (COPD, Asthma, etc):            Procedure: Lexiscan  Reaction/symptoms after receiving Viv injection:   Intensity of Pain:   Rhythm:   1. Vital Signs:/55, HR 87, RR 17  2. Vital Signs:/53, HR 87, RR 16     Reversal agent:     Post:   Resolution of symptoms?: YES  Vital signs: /66, HR 82, RR 15  Vital signs: BP /, HR , RR   Rhythm:   Walk: NO  Comment:   Return to Radiology

## 2019-07-10 NOTE — H&P
Hospitalist Observation Admission Note([facility])    Name: Won Huggins    MRN: [id number]          YOB: 1940    Age: 78 year old    Date of admission: 7/9/2019    Primary care provider: Kayla Delgadillo  Admitting physician:Freeman Meeks                 Assessment      Brief summary of admission assessment:Won Huggins is a 78 year old [place]  female with a significant past medical history of hypertension, malignancy(adenocarcinoma of the lung, anal cancer with colostomy) , pulmonary embolism, left lower extremity lymphedema, chronic compression fractures of T11 due to osteoporosis and stroke who presents with complaint of shortness of breath for the last 3 days.  She was initially seen at her primary care clinic in [place] and she was referred to [facility] for evaluation for pulmonary embolism given her bilateral leg cramps as well.    Here in the emergency department patient was found to have slightly elevated troponin otherwise no ischemic EKG changes.  CT scan of the chest showed no evidence of pulmonary embolism but she has vascular calcifications and stable ectasia of ascending thoracic aorta and a chronic appearing vertebral compression deformities.    Patient was then admitted to our service for further monitoring and evaluation        #1.  Shortness of breath: Acute onset of shortness of breath over the last 3 days associated with exertion and orthopnea.  Patient also has some cough.  Etiology of her symptoms unclear at the moment.  CT scan of the chest without evidence of acute pathology.    #2.  Elevated troponin: Trivial at 0.047.  Patient denies any chest pain associated with this.  No EKG changes.  Previous work-up for coronary artery disease was in 2017 when she had negative Lexiscan stress test.    #3.  Bilateral leg cramps: No evidence of deep vein thrombosis on ultrasound of the lower extremities bilaterally.  Electrolytes unremarkable            Reason for  admission:    Observation admission for monitoring and management of shortness of breath and minimal elevation of troponin    Observation Goals: Patient to remain stable and ruled out for acute coronary syndrome and stress test to be completed         Comorbid conditions:      History of anal  and lung cancer, VTE, lymphedema             Plan     > Admission Status:Admit to observation     >Care plan:  --Admit to observation telemetry bed, serial troponins, pain medication, stress testing in the morning with Lexiscan if acute coronary syndrome is excluded.    >Supportive care:Pain management: acetominophen and oral narcotics    >Diet:Diet advanced    >Activity:Advance activity as tolerated    >Education/Counseling :Discussed treatment plan with the patient    >Consults:none     >VTE prophylactic measures:prophylaxis against venous thromboembolism    >Therapies:none       >Additional orders    --Care plan discussed with the patient/family and agreed to care plan   --Patient will be transferred to care of hospitalist attending for further evaluation and management as appropriate   --Old medical orders reviewed   --imaging result independently reviewed by me     (See orders placed for this visit by me )     - Home medication reviewed and will be continued as appropriate once pharmacy reconciliation is completed             Code Status:     Full Code         Disposition:       >anticipate discharge to home and Anticipate discharge tomorrow            Disclaimer: This note consists of symbols derived from keyboarding, dictation and/or voice recognition software. As a result, there may be errors in the script that have gone undetected. Please consider this when interpreting information found in this chart.             Chief Complaint:     Shortness of breath and leg cramps     History is obtained from the patient          History of Present Illness:   This patient is a 78 year old  female with a significant past medical  history of anal and lung cancer who presents with the following condition requiring a hospital admission:    Shortness of breath and leg cramps    Patient is 78-year-old female Somali only speaking here with her daughter after she developed shortness of breath the last 3 days.  Patient has been having cough but denies any fever.  She is been having some chills as well.  She has bilateral lower extremity cramping associated with her shortness of breath.  She occasionally gets short of breath when she lays flat on her back.  Patient has left lower extremity swelling from her lymphedema but denies any worsening of shortness of breath.  She denies any chest pain.  Patient was seen by her primary care provider today and given history of previous venous thromboembolism, patient was referred here for further evaluation.  Extensive study here in the hospital emergency department revealed no evidence of pulmonary embolism or DVT.  Patient was found to have mildly elevated troponin at 0.047 and she was admitted to observation bed to exclude acute coronary syndrome and further evaluation tomorrow if she rules out for ischemia            Past Medical History:     Past Medical History:   Diagnosis Date     Cancer (H)      CVA (cerebral infarction)      Hypertension             Past Surgical History:     Past Surgical History:   Procedure Laterality Date     BIOPSY       LOBECTOMY LUNG               Social History:     Social History     Tobacco Use     Smoking status: Never Smoker   Substance Use Topics     Alcohol use: No             Family History:   Reviewed and non contributory        Allergies:   No Known Allergies          Medications:         Prior to Admission medications    Medication Sig Last Dose Taking? Auth Provider   Acetaminophen (TYLENOL PO) Take 325-650 mg by mouth every 4 hours as needed for mild pain or fever 7/9/2019 at am Yes Unknown, Entered By History   albuterol (PROAIR HFA/PROVENTIL HFA/VENTOLIN HFA)  108 (90 BASE) MCG/ACT Inhaler Inhale 1-2 puffs into the lungs every 4 hours as needed for shortness of breath / dyspnea or wheezing 7/9/2019 at am Yes Unknown, Entered By History   ASPIRIN EC PO Take 81 mg by mouth daily 7/9/2019 at am Yes Unknown, Entered By History   atorvastatin (LIPITOR) 10 MG tablet Take 1 tablet (10 mg) by mouth daily 7/9/2019 at am Yes Beka Flores MD   cetirizine (ZYRTEC) 10 MG tablet Take 10 mg by mouth daily as needed  7/9/2019 at am Yes Unknown, Entered By History   erlotinib (TARCEVA) 100 MG TABS Take 100 mg by mouth every other day  7/9/2019 at am Yes Unknown, Entered By History   fish oil-omega-3 fatty acids 1000 MG capsule Take 1 g by mouth daily 7/9/2019 at am Yes Unknown, Entered By History   fluticasone (FLONASE) 50 MCG/ACT spray Spray 2 sprays into both nostrils daily as needed for rhinitis or allergies 7/9/2019 at am Yes Unknown, Entered By History   Ginsengs-Royal Jelly-Vit B12 (GINSENG ROYAL JELLY PLUS PO) Take by mouth as needed Chinese OTC med 7/9/2019 at am Yes Unknown, Entered By History   hydrochlorothiazide (MICROZIDE) 12.5 MG capsule Take 12.5 mg by mouth daily  7/9/2019 at am Yes Reported, Patient   LOSARTAN POTASSIUM PO Take 25 mg by mouth daily  7/9/2019 at am Yes Reported, Patient   VITAMIN D, CHOLECALCIFEROL, PO Take by mouth daily Unknown OTC medication for bone health 7/9/2019 at am Yes Unknown, Entered By History   diclofenac (VOLTAREN) 1 % GEL topical gel Place 2 g onto the skin 4 times daily More than a month at prn  Beka Flores MD   guaiFENesin-codeine (ROBITUSSIN AC) 100-10 MG/5ML solution Take 1-2 tsp. by mouth every 6 hours as needed for cough More than a month at Unknown time  Unknown, Entered By History   Lidocaine-Menthol 3.99-1 % PTCH Externally apply 1 patch topically daily for 10 days   Beka Flores MD          Review of Systems:     A Comprehensive greater than 10 system review of systems was carried out.  Pertinent positives and  negatives are noted above in HPI.  Otherwise negative for contributory information.              Physical Exam:     Vitals were reviewed    Temp:  [98.3  F (36.8  C)] 98.3  F (36.8  C)  Pulse:  [59-66] 66  Heart Rate:  [57-62] 61  Resp:  [28] 28  BP: (140-200)/(62-89) 200/89  SpO2:  [99 %-100 %] 100 %      GEN:   Alert, oriented x 3, appears comfortable, NAD.  NECK:Supple ,no mass or thyromegaly   HEENT:   Normocephalic/atraumatic, no scleral icterus, no nasal discharge, mouth moist.  CV:   Regular rate and rhythm, no murmur or JVD.  S1 + S2 noted, no S3 or S4.  LUNGS:   Clear to auscultation bilaterally without rales/rhonchi/wheezing/retractions.  Symmetric chest rise on inhalation noted.  ABD:  Active bowel sounds, soft, non-tender/non-distended.  No rebound/guarding/rigidity.  Empty colostomy bag  EXT: Unilateral left lower extremity nonpitting edema   sKIN:  Dry to touch, no exanthems noted in the visualized areas.  Neurologic:Grossly intact,non focal     Neuropsychiatric:  General: normal, calm and normal eye contact  Level of consciousness: alert / normal  Affect: normal  Orientation: oriented to self, place, time and situation           Data:       All laboratory and imaging data in the past 24 hours reviewed     Results for orders placed or performed during the hospital encounter of 07/09/19   Chest XR,  PA & LAT    Narrative    XR CHEST 2 VW   7/9/2019 5:18 PM     HISTORY: dyspnea    COMPARISON: Film dated 9/3/2015    FINDINGS: Cardiac silhouette is at the upper limits of normal.  The  lungs are clear. The pulmonary vasculature is normal.  There are  compression fractures of multiple lower thoracic vertebrae. Appear new  when compared to 2015..      Impression    IMPRESSION:   1. No active infiltrates. No gross congestive heart failure.  2. Multiple age indeterminant compression fractures of lower thoracic  vertebrae.        ELIZABETH FAIR MD   US Lower Extremity Venous Duplex Bilateral    Narrative     BILATERAL LOWER EXTREMITY VENOUS DOPPLER ULTRASOUND  7/9/2019 5:16 PM    HISTORY: Bilateral leg pain. The concern is for deep venous  thrombosis.    COMPARISON: An ultrasound on 5/23/2017.    FINDINGS: Color flow and Doppler spectral waveform analysis was  performed of the common femoral, femoral, popliteal, posterior tibial,  and greater saphenous veins of the lower extremities bilaterally. No  thrombus is seen. I see no definite change since the previous  examination.      Impression    IMPRESSION: There is no evidence of deep venous thrombosis within the  lower extremities bilaterally.    JOCELYNN STEIN MD   CT Chest Pulmonary Embolism w Contrast    Narrative    CT CHEST PULMONARY EMBOLISM WITH CONTRAST  7/9/2019 6:43 PM     HISTORY: Dyspnea and right lower chest pain. Evaluate for pulmonary  embolism.    TECHNIQUE: Helical axial scans from lung apices through lung bases  with 56mL Isovue-370 IV contrast. Radiation dose for this scan was  reduced using automated exposure control, adjustment of the mA and/or  kV according to patient size, or iterative reconstruction technique.    COMPARISON: 11/2/2017.    FINDINGS: There is no CT evidence for pulmonary embolism or other  acute vascular abnormality of the chest. Mild ascending thoracic  aortic ectasia without change. Vascular calcifications, including  coronary artery calcifications. The mediastinal and hilar structures  are otherwise unremarkable and unchanged. The lungs remain clear  bilaterally. Visualized upper abdomen again shows mild diffuse  thickening of the medial limb of left adrenal gland, but this is  stable and of doubtful significance.      Impression    IMPRESSION:  1. No evidence for pulmonary embolism.  2. Vascular calcifications, including coronary artery calcifications.  3. Stable ectasia ascending thoracic aorta.  4. Stable thickening medial limb of left adrenal gland.  5. Multiple chronic-appearing vertebral compression deformities.      CBC with platelets differential   Result Value Ref Range    WBC 6.3 4.0 - 11.0 10e9/L    RBC Count 4.17 3.8 - 5.2 10e12/L    Hemoglobin 12.8 11.7 - 15.7 g/dL    Hematocrit 38.3 35.0 - 47.0 %    MCV 92 78 - 100 fl    MCH 30.7 26.5 - 33.0 pg    MCHC 33.4 31.5 - 36.5 g/dL    RDW 12.5 10.0 - 15.0 %    Platelet Count 220 150 - 450 10e9/L    Diff Method Automated Method     % Neutrophils 55.0 %    % Lymphocytes 33.0 %    % Monocytes 10.0 %    % Eosinophils 1.4 %    % Basophils 0.3 %    % Immature Granulocytes 0.3 %    Nucleated RBCs 0 0 /100    Absolute Neutrophil 3.5 1.6 - 8.3 10e9/L    Absolute Lymphocytes 2.1 0.8 - 5.3 10e9/L    Absolute Monocytes 0.6 0.0 - 1.3 10e9/L    Absolute Eosinophils 0.1 0.0 - 0.7 10e9/L    Absolute Basophils 0.0 0.0 - 0.2 10e9/L    Abs Immature Granulocytes 0.0 0 - 0.4 10e9/L    Absolute Nucleated RBC 0.0    Comprehensive metabolic panel   Result Value Ref Range    Sodium 133 133 - 144 mmol/L    Potassium 3.8 3.4 - 5.3 mmol/L    Chloride 97 94 - 109 mmol/L    Carbon Dioxide 28 20 - 32 mmol/L    Anion Gap 8 3 - 14 mmol/L    Glucose 127 (H) 70 - 99 mg/dL    Urea Nitrogen 17 7 - 30 mg/dL    Creatinine 0.72 0.52 - 1.04 mg/dL    GFR Estimate 80 >60 mL/min/[1.73_m2]    GFR Estimate If Black >90 >60 mL/min/[1.73_m2]    Calcium 9.4 8.5 - 10.1 mg/dL    Bilirubin Total 0.5 0.2 - 1.3 mg/dL    Albumin 3.1 (L) 3.4 - 5.0 g/dL    Protein Total 7.4 6.8 - 8.8 g/dL    Alkaline Phosphatase 46 40 - 150 U/L    ALT 36 0 - 50 U/L    AST 32 0 - 45 U/L   Magnesium   Result Value Ref Range    Magnesium 2.0 1.6 - 2.3 mg/dL   Troponin I   Result Value Ref Range    Troponin I ES 0.047 (H) 0.000 - 0.045 ug/L   Nt probnp inpatient (BNP)   Result Value Ref Range    N-Terminal Pro BNP Inpatient 55 0 - 1,800 pg/mL   D dimer quantitative   Result Value Ref Range    D Dimer 0.6 (H) 0.0 - 0.50 ug/ml FEU   EKG 12-lead, tracing only   Result Value Ref Range    Interpretation ECG Click View Image link to view waveform and result              Recent Results (from the past 48 hour(s))   US Lower Extremity Venous Duplex Bilateral    Narrative    BILATERAL LOWER EXTREMITY VENOUS DOPPLER ULTRASOUND  7/9/2019 5:16 PM    HISTORY: Bilateral leg pain. The concern is for deep venous  thrombosis.    COMPARISON: An ultrasound on 5/23/2017.    FINDINGS: Color flow and Doppler spectral waveform analysis was  performed of the common femoral, femoral, popliteal, posterior tibial,  and greater saphenous veins of the lower extremities bilaterally. No  thrombus is seen. I see no definite change since the previous  examination.      Impression    IMPRESSION: There is no evidence of deep venous thrombosis within the  lower extremities bilaterally.    JOCELYNN STEIN MD   Chest XR,  PA & LAT    Narrative    XR CHEST 2 VW   7/9/2019 5:18 PM     HISTORY: dyspnea    COMPARISON: Film dated 9/3/2015    FINDINGS: Cardiac silhouette is at the upper limits of normal.  The  lungs are clear. The pulmonary vasculature is normal.  There are  compression fractures of multiple lower thoracic vertebrae. Appear new  when compared to 2015..      Impression    IMPRESSION:   1. No active infiltrates. No gross congestive heart failure.  2. Multiple age indeterminant compression fractures of lower thoracic  vertebrae.        ELIZABETH FAIR MD   CT Chest Pulmonary Embolism w Contrast    Narrative    CT CHEST PULMONARY EMBOLISM WITH CONTRAST  7/9/2019 6:43 PM     HISTORY: Dyspnea and right lower chest pain. Evaluate for pulmonary  embolism.    TECHNIQUE: Helical axial scans from lung apices through lung bases  with 56mL Isovue-370 IV contrast. Radiation dose for this scan was  reduced using automated exposure control, adjustment of the mA and/or  kV according to patient size, or iterative reconstruction technique.    COMPARISON: 11/2/2017.    FINDINGS: There is no CT evidence for pulmonary embolism or other  acute vascular abnormality of the chest. Mild ascending thoracic  aortic  ectasia without change. Vascular calcifications, including  coronary artery calcifications. The mediastinal and hilar structures  are otherwise unremarkable and unchanged. The lungs remain clear  bilaterally. Visualized upper abdomen again shows mild diffuse  thickening of the medial limb of left adrenal gland, but this is  stable and of doubtful significance.      Impression    IMPRESSION:  1. No evidence for pulmonary embolism.  2. Vascular calcifications, including coronary artery calcifications.  3. Stable ectasia ascending thoracic aorta.  4. Stable thickening medial limb of left adrenal gland.  5. Multiple chronic-appearing vertebral compression deformities.         EKG results: Normal sinus rhythm with left axis deviation.  No ST segment elevation         All imaging studies reviewed by me.       Patient`s old medical records reviewed and case discussed with the ED physician.    ED course-Reviewed

## 2019-07-19 ENCOUNTER — APPOINTMENT (OUTPATIENT)
Dept: CT IMAGING | Facility: CLINIC | Age: 79
End: 2019-07-19
Attending: EMERGENCY MEDICINE
Payer: COMMERCIAL

## 2019-07-19 ENCOUNTER — HOSPITAL ENCOUNTER (EMERGENCY)
Facility: CLINIC | Age: 79
Discharge: HOME | End: 2019-07-19
Attending: EMERGENCY MEDICINE | Admitting: EMERGENCY MEDICINE
Payer: COMMERCIAL

## 2019-07-19 VITALS
DIASTOLIC BLOOD PRESSURE: 79 MMHG | HEART RATE: 84 BPM | OXYGEN SATURATION: 99 % | RESPIRATION RATE: 16 BRPM | TEMPERATURE: 98 F | SYSTOLIC BLOOD PRESSURE: 153 MMHG

## 2019-07-19 DIAGNOSIS — S39.012A STRAIN OF LUMBAR REGION, INITIAL ENCOUNTER: ICD-10-CM

## 2019-07-19 LAB
ALBUMIN UR-MCNC: NEGATIVE MG/DL
ANION GAP SERPL CALCULATED.3IONS-SCNC: 3 MMOL/L (ref 3–14)
APPEARANCE UR: CLEAR
BASOPHILS # BLD AUTO: 0 10E9/L (ref 0–0.2)
BASOPHILS NFR BLD AUTO: 0.2 %
BILIRUB UR QL STRIP: NEGATIVE
BUN SERPL-MCNC: 18 MG/DL (ref 7–30)
CALCIUM SERPL-MCNC: 9.3 MG/DL (ref 8.5–10.1)
CHLORIDE SERPL-SCNC: 102 MMOL/L (ref 94–109)
CO2 SERPL-SCNC: 30 MMOL/L (ref 20–32)
COLOR UR AUTO: NORMAL
CREAT SERPL-MCNC: 0.66 MG/DL (ref 0.52–1.04)
DIFFERENTIAL METHOD BLD: NORMAL
EOSINOPHIL # BLD AUTO: 0.1 10E9/L (ref 0–0.7)
EOSINOPHIL NFR BLD AUTO: 0.7 %
ERYTHROCYTE [DISTWIDTH] IN BLOOD BY AUTOMATED COUNT: 12.3 % (ref 10–15)
GFR SERPL CREATININE-BSD FRML MDRD: 84 ML/MIN/{1.73_M2}
GLUCOSE SERPL-MCNC: 109 MG/DL (ref 70–99)
GLUCOSE UR STRIP-MCNC: NEGATIVE MG/DL
HCT VFR BLD AUTO: 39.4 % (ref 35–47)
HGB BLD-MCNC: 12.8 G/DL (ref 11.7–15.7)
HGB UR QL STRIP: NEGATIVE
IMM GRANULOCYTES # BLD: 0.1 10E9/L (ref 0–0.4)
IMM GRANULOCYTES NFR BLD: 0.5 %
KETONES UR STRIP-MCNC: NEGATIVE MG/DL
LEUKOCYTE ESTERASE UR QL STRIP: NEGATIVE
LYMPHOCYTES # BLD AUTO: 1.2 10E9/L (ref 0.8–5.3)
LYMPHOCYTES NFR BLD AUTO: 12 %
MCH RBC QN AUTO: 30.4 PG (ref 26.5–33)
MCHC RBC AUTO-ENTMCNC: 32.5 G/DL (ref 31.5–36.5)
MCV RBC AUTO: 94 FL (ref 78–100)
MONOCYTES # BLD AUTO: 0.9 10E9/L (ref 0–1.3)
MONOCYTES NFR BLD AUTO: 8.7 %
NEUTROPHILS # BLD AUTO: 8.1 10E9/L (ref 1.6–8.3)
NEUTROPHILS NFR BLD AUTO: 77.9 %
NITRATE UR QL: NEGATIVE
NRBC # BLD AUTO: 0 10*3/UL
NRBC BLD AUTO-RTO: 0 /100
PH UR STRIP: 7 PH (ref 5–7)
PLATELET # BLD AUTO: 212 10E9/L (ref 150–450)
POTASSIUM SERPL-SCNC: 4.7 MMOL/L (ref 3.4–5.3)
RBC # BLD AUTO: 4.21 10E12/L (ref 3.8–5.2)
SODIUM SERPL-SCNC: 135 MMOL/L (ref 133–144)
SOURCE: NORMAL
SP GR UR STRIP: 1 (ref 1–1.03)
UROBILINOGEN UR STRIP-MCNC: NORMAL MG/DL (ref 0–2)
WBC # BLD AUTO: 10.3 10E9/L (ref 4–11)

## 2019-07-19 PROCEDURE — 25000128 H RX IP 250 OP 636: Performed by: EMERGENCY MEDICINE

## 2019-07-19 PROCEDURE — 80048 BASIC METABOLIC PNL TOTAL CA: CPT | Performed by: EMERGENCY MEDICINE

## 2019-07-19 PROCEDURE — 99285 EMERGENCY DEPT VISIT HI MDM: CPT | Mod: 25

## 2019-07-19 PROCEDURE — 85025 COMPLETE CBC W/AUTO DIFF WBC: CPT | Performed by: EMERGENCY MEDICINE

## 2019-07-19 PROCEDURE — 36415 COLL VENOUS BLD VENIPUNCTURE: CPT | Performed by: EMERGENCY MEDICINE

## 2019-07-19 PROCEDURE — 93005 ELECTROCARDIOGRAM TRACING: CPT

## 2019-07-19 PROCEDURE — 25000132 ZZH RX MED GY IP 250 OP 250 PS 637: Performed by: EMERGENCY MEDICINE

## 2019-07-19 PROCEDURE — 72128 CT CHEST SPINE W/O DYE: CPT

## 2019-07-19 PROCEDURE — 96374 THER/PROPH/DIAG INJ IV PUSH: CPT

## 2019-07-19 PROCEDURE — 81003 URINALYSIS AUTO W/O SCOPE: CPT | Performed by: EMERGENCY MEDICINE

## 2019-07-19 PROCEDURE — 72131 CT LUMBAR SPINE W/O DYE: CPT

## 2019-07-19 PROCEDURE — 96375 TX/PRO/DX INJ NEW DRUG ADDON: CPT

## 2019-07-19 RX ORDER — HYDROMORPHONE HYDROCHLORIDE 1 MG/ML
0.25 INJECTION, SOLUTION INTRAMUSCULAR; INTRAVENOUS; SUBCUTANEOUS
Status: DISCONTINUED | OUTPATIENT
Start: 2019-07-19 | End: 2019-07-20 | Stop reason: HOSPADM

## 2019-07-19 RX ORDER — METHOCARBAMOL 500 MG/1
750 TABLET, FILM COATED ORAL 3 TIMES DAILY PRN
Qty: 15 TABLET | Refills: 0 | Status: SHIPPED | OUTPATIENT
Start: 2019-07-19

## 2019-07-19 RX ORDER — KETOROLAC TROMETHAMINE 15 MG/ML
15 INJECTION, SOLUTION INTRAMUSCULAR; INTRAVENOUS ONCE
Status: COMPLETED | OUTPATIENT
Start: 2019-07-19 | End: 2019-07-19

## 2019-07-19 RX ORDER — OXYCODONE HYDROCHLORIDE 5 MG/1
2.5-5 TABLET ORAL EVERY 6 HOURS PRN
Qty: 10 TABLET | Refills: 0 | Status: ON HOLD | OUTPATIENT
Start: 2019-07-19 | End: 2019-07-23

## 2019-07-19 RX ORDER — METHOCARBAMOL 500 MG/1
500 TABLET, FILM COATED ORAL ONCE
Status: COMPLETED | OUTPATIENT
Start: 2019-07-19 | End: 2019-07-19

## 2019-07-19 RX ADMIN — METHOCARBAMOL 500 MG: 500 TABLET ORAL at 19:31

## 2019-07-19 RX ADMIN — KETOROLAC TROMETHAMINE 15 MG: 15 INJECTION, SOLUTION INTRAMUSCULAR; INTRAVENOUS at 19:14

## 2019-07-19 RX ADMIN — HYDROMORPHONE HYDROCHLORIDE 0.25 MG: 1 INJECTION, SOLUTION INTRAMUSCULAR; INTRAVENOUS; SUBCUTANEOUS at 20:23

## 2019-07-19 ASSESSMENT — ENCOUNTER SYMPTOMS
DIFFICULTY URINATING: 1
ABDOMINAL PAIN: 1
MYALGIAS: 0
SHORTNESS OF BREATH: 0
VOMITING: 0
BACK PAIN: 1

## 2019-07-19 NOTE — ED TRIAGE NOTES
Pt arrives via EMS. Per EMS, pt bent over yesterday and began experiencing right sided back pain that radiates from her hip to her shoulder. Pt attempted to take tylenol today but pain became significantly worse. EMS administered 100 mcg fentanyl and family who is translating reports that this has helped her. EMS also reported that the last time this happened pt had a fracture in her back. Pt alert, oriented x3 ABCs intact

## 2019-07-19 NOTE — ED AVS SNAPSHOT
[facility] Emergency Department  [address]  [facility] [address]  Phone:  [phone]  Fax:  [phone]                                    Won Huggins   MRN: [id number]    Department:  [facility] Emergency Department   Date of Visit:  7/19/2019           After Visit Summary Signature Page    I have received my discharge instructions, and my questions have been answered. I have discussed any challenges I see with this plan with the nurse or doctor.    ..........................................................................................................................................  Patient/Patient Representative Signature      ..........................................................................................................................................  Patient Representative Print Name and Relationship to Patient    ..................................................               ................................................  Date                                   Time    ..........................................................................................................................................  Reviewed by Signature/Title    ...................................................              ..............................................  Date                                               Time          22EPIC Rev 08/18

## 2019-07-19 NOTE — ED PROVIDER NOTES
History     Chief Complaint:  Back Pain      HPI   Won Huggins is a 78 year old female with a history of osteoporosis and compression fractures who presents with back pain. The had a recented ED evaluation and observation stay for SOB during which the patient had a PE study that was negative on 7/9 as well as a normal Viv-scan. The patient says that she was bending to open a drawer and when she stood up she had pain all over her back but mostly in the lower right as well as pain in the top of her abdomen. She says that it is worse with movement. She says that the severe pain sometimes takes her breath away, but she denies any shortness of breath. She also notes some difficulty urinating when she is in pain but when clarified this is difficulty ambulating to the bathroom due to pain not true incontinence. She denies any falls, chest pain, vomiting, or any pain in her legs.          Allergies:  No Known Drug Allergies     Medications:    Robaxin   Roxicodone  Albuterol   81 mg Aspirin  Lipitor  Erlotinib  Flonase  Hydrochlorothiazide  Losartan  Minocycline  Ambien      Past Medical History:    Tibial fracture  Cancer  CVA  HTN  Allergic rhinitis  Pleural effusion  Vertigo  PE  Chronic insomnia  Osteoporosis  Goiter  Lumbago  Carpal tunnel syndrome  Hepatitis C  UTI    Past Surgical History:    Hysterectomy  Carpal tunnel release  Right lower lobectomy and lymphadenectomy  Colostomy  ORIF left wrist  ORIF left hip  Bilateral cataract removal with implant  Abdomen surgery  Eye surgery    Family History:    Cancer     Social History:  The patient was accompanied to the ED by daughter.  Smoking Status: Never Smoker  PCP: Kayla Delgadillo   Marital Status:          Review of Systems   Respiratory: Negative for shortness of breath.    Cardiovascular: Negative for chest pain.   Gastrointestinal: Positive for abdominal pain. Negative for vomiting.   Genitourinary: Positive for difficulty urinating.   Musculoskeletal:  Positive for back pain. Negative for myalgias.   All other systems reviewed and are negative.      Physical Exam     Patient Vitals for the past 24 hrs:   BP Temp Temp src Pulse Heart Rate Resp SpO2   07/19/19 2157 -- -- -- -- -- -- 99 %   07/19/19 2156 153/79 -- -- 84 -- -- --   07/19/19 2030 -- -- -- -- -- -- 99 %   07/19/19 1912 -- -- -- -- -- -- 97 %   07/19/19 1800 174/87 -- -- -- -- -- 90 %   07/19/19 1756 174/87 98  F (36.7  C) Oral 70 70 16 95 %        Physical Exam    Gen: alert  HEENT: PERRL, oropharynx clear  Neck: normal ROM  CV: RRR, no murmurs  Pulm: breath sounds equal, lungs clear  Abd: Soft, nontender  Back: no evidence of injury, no cva tenderness. Diffuse lumbar and thoracic spinal tenderness.  MSK: no deformity, moves all extremities,  Compression stocking on left lower extremity with edema. 2+ DP pulses bilaterally  Skin: no rash  Neuro: alert, appropriate conversation and interaction, 5/5 strength BLE in hip flexion and ext, knee flexion and ext, plantar and dorsiflexion, and extension of EHL, sensation intact to light touch over all dermatomes of the legs, gait- steady after analgesia.    Emergency Department Course     ECG:  ECG taken at 1902, ECG read at 1909  Normal sinus rhythm  Left axis deviation   Abnormal ECG  Rate 67 bpm. PA interval 180 ms. QRS duration 82 ms. QT/QTc 420/443 ms. P-R-T axes 31 -45 27.    Imaging:  Radiology findings were communicated with the patient who voiced understanding of the findings.    Lumbar spine CT w/o contrast  1. No acute fracture or subluxation of the thoracolumbar spine.  2. Stable chronic compression fractures at T11, L1 and L2 with  associated moderate kyphotic deformity of the thoracolumbar junction  and mild convex right thoracic scoliosis. Retropulsed fracture  fragments result in moderate spinal canal stenosis at L2 and mild  spinal canal stenosis at T11.  3. Multilevel thoracolumbar spondylosis, not significantly changed in  the lumbar spine  since 10/1/2017.  4. Osteopenia.  LIZETTE RIZO MD    CT Thoracic Spine w/o Contrast  1. No acute fracture or subluxation of the thoracolumbar spine.  2. Stable chronic compression fractures at T11, L1 and L2 with  associated moderate kyphotic deformity of the thoracolumbar junction  and mild convex right thoracic scoliosis. Retropulsed fracture  fragments result in moderate spinal canal stenosis at L2 and mild  spinal canal stenosis at T11.  3. Multilevel thoracolumbar spondylosis, not significantly changed in  the lumbar spine since 10/1/2017.  4. Osteopenia.  LIZETTE RIZO MD      Laboratory:  Laboratory findings were communicated with the patient who voiced understanding of the findings.    UA: WNL  CBC: WBC 10.3, HGB 12.8,   BMP: glucose 109 (H) o/w WNL (Creatinine 0.66)      Interventions:  1914 Toradol 15 mg  1931 Robaxin 500 mg  2023 Dilaudid 0.25 mg    Emergency Department Course:    1804 Nursing notes and vitals reviewed.    1842 I performed an exam of the patient as documented above.     2000 The patient was sent for a CT while in the emergency department, results above.       2204 Patient discharged.    Impression & Plan        Medical Decision Making:  Won Huggins is a 78 year old female who presents to the emergency department today for evaluation of back pain. This occurred after standing up from a bent over position. There was no fall or trauma. Here, laboratory studies are normal UA is normal without evidence for further urinary process to cause her back pain. Her lower neuro extremity exam does not show any evidence of weakness and denies numbness tingling. patient has history of osteoporotic compression fracture. CT of the thoracic and lumbar spin taken today has not shown a change from baseline. Findings discussed with patient and daughter. after pain control here she is ambulatory.  Given her elderly age and history of compression fractures and initial significant pain discussed  the option for admission for observation for ongoing pain control and to see a physical therapist. patient prefers discharge home. She has been ambulatory here therefore I do feel this is reasonable.  She lives with her daughters. Discharged home I planned for scheduled tylenol, robaxin, and Roxicodone. opiate precautions discussed. Talk to primary care on Monday. Return for worsening.      Diagnosis:    ICD-10-CM    1. Strain of lumbar region, initial encounter S39.012A      Disposition:   The patient is discharged to home.     Discharge Medications:     Review of your medicines         START taking      Dose / Directions   methocarbamol 500 MG tablet  Commonly known as:  ROBAXIN      Dose:  750 mg  Take 1.5 tablets (750 mg) by mouth 3 times daily as needed for muscle spasms  Quantity:  15 tablet  Refills:  0     oxyCODONE 5 MG tablet  Commonly known as:  ROXICODONE      Dose:  2.5-5 mg  Take 0.5-1 tablets (2.5-5 mg) by mouth every 6 hours as needed for pain  Quantity:  10 tablet  Refills:  0           Where to get your medicines      Some of these will need a paper prescription and others can be bought over the counter. Ask your nurse if you have questions.    Bring a paper prescription for each of these medications    methocarbamol 500 MG tablet    oxyCODONE 5 MG tablet         Scribe Disclosure:  I, Iron White, am serving as a scribe at 6:40 PM on 7/19/2019 to document services personally performed by Aminah Whalen MD based on my observations and the provider's statements to me.      [facility] EMERGENCY DEPARTMENT       Aminah Whalen MD  07/21/19 [id number]

## 2019-07-19 NOTE — ED NOTES
Bed: ED38  Expected date: 7/19/19  Expected time: 5:46 PM  Means of arrival: Ambulance  Comments:  ana laura Kincaid

## 2019-07-20 LAB — INTERPRETATION ECG - MUSE: NORMAL

## 2019-07-20 NOTE — DISCHARGE INSTRUCTIONS
Take tylenol 1000mg 4x per day.  Do not take more than 4000mg tylenol in 24 hours.  Use oxycodone and robaxin as needed for additional pain control.      Opioid Medication Information    You have been given a prescription for an opioid (narcotic) pain medicine and/or have received a pain medicine while here in the Emergency Department. These medicines can make you drowsy or impaired. You must not drive, operate dangerous equipment, or engage in any other dangerous activities while taking these medications. If you drive while taking these medications, you could be arrested for driving under the influence (DUI). Do not drink any alcohol while you are taking these medications.     Opioid pain medications can cause addiction. If you have a history of chemical dependency of any type, you are at a higher risk of becoming addicted to pain medications.  Only take these prescribed medications to treat your pain when all other options have been tried. Take it for as short a time and as few doses as possible. Store your pain pills in a secure place, as they are frequently stolen and provide a dangerous opportunity for children or visitors in your house to start abusing these powerful medications. We will not replace any lost or stolen medicine.    If you do not finish your medication, it is a good idea to get rid of it but please do not flush it down the toilet. Please dispose of the remaining medication at a local pharmacy or law enforcement facility. The [place] [employer] has additional information on medication disposal: https://www.pca.[facility].[place].us/living-green/managing-unwanted-medications.      Many prescription pain medications contain Tylenol  (acetaminophen), including Vicodin , Tylenol #3 , Norco , Lortab , and Percocet .  You should not take any extra pills of Tylenol  if you are using these prescription medications or you can get very sick.  Do not ever take more than 3000 mg of acetaminophen in any  24 hour period.    All opioids tend to cause constipation. Drink plenty of water and eat foods that have a lot of fiber, such as fruits, vegetables, prune juice, apple juice and high fiber cereal.  Take a laxative if you don t move your bowels at least every other day. Miralax , Milk of Magnesia, Colace , or Senna  can be used to keep you regular.

## 2019-07-22 ENCOUNTER — HOSPITAL ENCOUNTER (OUTPATIENT)
Facility: CLINIC | Age: 79
Setting detail: OBSERVATION
Discharge: HOME | End: 2019-07-23
Attending: EMERGENCY MEDICINE | Admitting: HOSPITALIST
Payer: COMMERCIAL

## 2019-07-22 DIAGNOSIS — K59.03 DRUG-INDUCED CONSTIPATION: Primary | ICD-10-CM

## 2019-07-22 DIAGNOSIS — M54.50 THORACOLUMBAR BACK PAIN: ICD-10-CM

## 2019-07-22 DIAGNOSIS — M54.6 THORACOLUMBAR BACK PAIN: ICD-10-CM

## 2019-07-22 DIAGNOSIS — M54.5 ACUTE LOW BACK PAIN, UNSPECIFIED BACK PAIN LATERALITY, WITH SCIATICA PRESENCE UNSPECIFIED: ICD-10-CM

## 2019-07-22 DIAGNOSIS — M62.830 BACK MUSCLE SPASM: ICD-10-CM

## 2019-07-22 PROBLEM — M54.9 BACK PAIN: Status: ACTIVE | Noted: 2019-07-22

## 2019-07-22 LAB
ANION GAP SERPL CALCULATED.3IONS-SCNC: 4 MMOL/L (ref 3–14)
BASOPHILS # BLD AUTO: 0 10E9/L (ref 0–0.2)
BASOPHILS NFR BLD AUTO: 0.2 %
BUN SERPL-MCNC: 16 MG/DL (ref 7–30)
CALCIUM SERPL-MCNC: 9 MG/DL (ref 8.5–10.1)
CHLORIDE SERPL-SCNC: 102 MMOL/L (ref 94–109)
CO2 SERPL-SCNC: 32 MMOL/L (ref 20–32)
CREAT SERPL-MCNC: 0.7 MG/DL (ref 0.52–1.04)
DIFFERENTIAL METHOD BLD: ABNORMAL
EOSINOPHIL # BLD AUTO: 0.1 10E9/L (ref 0–0.7)
EOSINOPHIL NFR BLD AUTO: 1.6 %
ERYTHROCYTE [DISTWIDTH] IN BLOOD BY AUTOMATED COUNT: 12.6 % (ref 10–15)
GFR SERPL CREATININE-BSD FRML MDRD: 83 ML/MIN/{1.73_M2}
GLUCOSE SERPL-MCNC: 133 MG/DL (ref 70–99)
HCT VFR BLD AUTO: 39 % (ref 35–47)
HGB BLD-MCNC: 12.2 G/DL (ref 11.7–15.7)
IMM GRANULOCYTES # BLD: 0 10E9/L (ref 0–0.4)
IMM GRANULOCYTES NFR BLD: 0.4 %
LYMPHOCYTES # BLD AUTO: 1.1 10E9/L (ref 0.8–5.3)
LYMPHOCYTES NFR BLD AUTO: 13.7 %
MCH RBC QN AUTO: 29.9 PG (ref 26.5–33)
MCHC RBC AUTO-ENTMCNC: 31.3 G/DL (ref 31.5–36.5)
MCV RBC AUTO: 96 FL (ref 78–100)
MONOCYTES # BLD AUTO: 0.8 10E9/L (ref 0–1.3)
MONOCYTES NFR BLD AUTO: 9.1 %
NEUTROPHILS # BLD AUTO: 6.2 10E9/L (ref 1.6–8.3)
NEUTROPHILS NFR BLD AUTO: 75 %
NRBC # BLD AUTO: 0 10*3/UL
NRBC BLD AUTO-RTO: 0 /100
PLATELET # BLD AUTO: 197 10E9/L (ref 150–450)
POTASSIUM SERPL-SCNC: 3.5 MMOL/L (ref 3.4–5.3)
RBC # BLD AUTO: 4.08 10E12/L (ref 3.8–5.2)
SODIUM SERPL-SCNC: 137 MMOL/L (ref 133–144)
WBC # BLD AUTO: 8.2 10E9/L (ref 4–11)

## 2019-07-22 PROCEDURE — 25000132 ZZH RX MED GY IP 250 OP 250 PS 637: Performed by: EMERGENCY MEDICINE

## 2019-07-22 PROCEDURE — 25000132 ZZH RX MED GY IP 250 OP 250 PS 637: Performed by: PHYSICIAN ASSISTANT

## 2019-07-22 PROCEDURE — 85025 COMPLETE CBC W/AUTO DIFF WBC: CPT | Performed by: EMERGENCY MEDICINE

## 2019-07-22 PROCEDURE — 99218 ZZC INITIAL OBSERVATION CARE,LEVL I: CPT | Performed by: PHYSICIAN ASSISTANT

## 2019-07-22 PROCEDURE — 99285 EMERGENCY DEPT VISIT HI MDM: CPT | Mod: 25

## 2019-07-22 PROCEDURE — 96374 THER/PROPH/DIAG INJ IV PUSH: CPT

## 2019-07-22 PROCEDURE — 80048 BASIC METABOLIC PNL TOTAL CA: CPT | Performed by: EMERGENCY MEDICINE

## 2019-07-22 PROCEDURE — 25000128 H RX IP 250 OP 636: Performed by: PHYSICIAN ASSISTANT

## 2019-07-22 PROCEDURE — 99207 ZZC CDG-HISTORY COMP: MEETS EXP. PROB FOCUSED- DOWN CODED LACK OF PFSH: CPT | Performed by: PHYSICIAN ASSISTANT

## 2019-07-22 PROCEDURE — 36415 COLL VENOUS BLD VENIPUNCTURE: CPT | Performed by: EMERGENCY MEDICINE

## 2019-07-22 PROCEDURE — G0378 HOSPITAL OBSERVATION PER HR: HCPCS

## 2019-07-22 RX ORDER — ALBUTEROL SULFATE 90 UG/1
1-2 AEROSOL, METERED RESPIRATORY (INHALATION) EVERY 4 HOURS PRN
Status: DISCONTINUED | OUTPATIENT
Start: 2019-07-22 | End: 2019-07-23 | Stop reason: HOSPADM

## 2019-07-22 RX ORDER — ONDANSETRON 2 MG/ML
4 INJECTION INTRAMUSCULAR; INTRAVENOUS EVERY 6 HOURS PRN
Status: DISCONTINUED | OUTPATIENT
Start: 2019-07-22 | End: 2019-07-23 | Stop reason: HOSPADM

## 2019-07-22 RX ORDER — AMOXICILLIN 250 MG
2 CAPSULE ORAL 2 TIMES DAILY PRN
Status: DISCONTINUED | OUTPATIENT
Start: 2019-07-22 | End: 2019-07-23 | Stop reason: HOSPADM

## 2019-07-22 RX ORDER — HYDROMORPHONE HYDROCHLORIDE 1 MG/ML
.3-.5 INJECTION, SOLUTION INTRAMUSCULAR; INTRAVENOUS; SUBCUTANEOUS EVERY 4 HOURS PRN
Status: DISCONTINUED | OUTPATIENT
Start: 2019-07-22 | End: 2019-07-23 | Stop reason: HOSPADM

## 2019-07-22 RX ORDER — DEXAMETHASONE SODIUM PHOSPHATE 4 MG/ML
6 INJECTION, SOLUTION INTRA-ARTICULAR; INTRALESIONAL; INTRAMUSCULAR; INTRAVENOUS; SOFT TISSUE ONCE
Status: COMPLETED | OUTPATIENT
Start: 2019-07-23 | End: 2019-07-23

## 2019-07-22 RX ORDER — POLYETHYLENE GLYCOL 3350 17 G/17G
17 POWDER, FOR SOLUTION ORAL DAILY PRN
Status: DISCONTINUED | OUTPATIENT
Start: 2019-07-22 | End: 2019-07-23 | Stop reason: HOSPADM

## 2019-07-22 RX ORDER — GABAPENTIN 100 MG/1
100 CAPSULE ORAL 3 TIMES DAILY
Status: DISCONTINUED | OUTPATIENT
Start: 2019-07-22 | End: 2019-07-23 | Stop reason: HOSPADM

## 2019-07-22 RX ORDER — ONDANSETRON 4 MG/1
4 TABLET, ORALLY DISINTEGRATING ORAL EVERY 6 HOURS PRN
Status: DISCONTINUED | OUTPATIENT
Start: 2019-07-22 | End: 2019-07-23 | Stop reason: HOSPADM

## 2019-07-22 RX ORDER — HYDROCHLOROTHIAZIDE 12.5 MG/1
12.5 CAPSULE ORAL DAILY
Status: DISCONTINUED | OUTPATIENT
Start: 2019-07-23 | End: 2019-07-23 | Stop reason: HOSPADM

## 2019-07-22 RX ORDER — ACETAMINOPHEN 500 MG
1000 TABLET ORAL EVERY 8 HOURS
Status: DISCONTINUED | OUTPATIENT
Start: 2019-07-22 | End: 2019-07-23 | Stop reason: HOSPADM

## 2019-07-22 RX ORDER — OXYCODONE AND ACETAMINOPHEN 5; 325 MG/1; MG/1
1 TABLET ORAL ONCE
Status: COMPLETED | OUTPATIENT
Start: 2019-07-22 | End: 2019-07-22

## 2019-07-22 RX ORDER — NALOXONE HYDROCHLORIDE 0.4 MG/ML
.1-.4 INJECTION, SOLUTION INTRAMUSCULAR; INTRAVENOUS; SUBCUTANEOUS
Status: DISCONTINUED | OUTPATIENT
Start: 2019-07-22 | End: 2019-07-23 | Stop reason: HOSPADM

## 2019-07-22 RX ORDER — LIDOCAINE 4 G/G
1 PATCH TOPICAL
Status: DISCONTINUED | OUTPATIENT
Start: 2019-07-22 | End: 2019-07-23 | Stop reason: HOSPADM

## 2019-07-22 RX ORDER — ATORVASTATIN CALCIUM 10 MG/1
10 TABLET, FILM COATED ORAL DAILY
Status: DISCONTINUED | OUTPATIENT
Start: 2019-07-23 | End: 2019-07-23 | Stop reason: HOSPADM

## 2019-07-22 RX ORDER — CYCLOBENZAPRINE HCL 10 MG
10 TABLET ORAL 3 TIMES DAILY PRN
Status: DISCONTINUED | OUTPATIENT
Start: 2019-07-22 | End: 2019-07-23 | Stop reason: HOSPADM

## 2019-07-22 RX ORDER — CODEINE PHOSPHATE AND GUAIFENESIN 10; 100 MG/5ML; MG/5ML
5-10 SOLUTION ORAL EVERY 6 HOURS PRN
Status: DISCONTINUED | OUTPATIENT
Start: 2019-07-22 | End: 2019-07-23 | Stop reason: HOSPADM

## 2019-07-22 RX ORDER — AMOXICILLIN 250 MG
1 CAPSULE ORAL 2 TIMES DAILY PRN
Status: DISCONTINUED | OUTPATIENT
Start: 2019-07-22 | End: 2019-07-23 | Stop reason: HOSPADM

## 2019-07-22 RX ORDER — NALOXONE HYDROCHLORIDE 0.4 MG/ML
.1-.4 INJECTION, SOLUTION INTRAMUSCULAR; INTRAVENOUS; SUBCUTANEOUS
Status: DISCONTINUED | OUTPATIENT
Start: 2019-07-22 | End: 2019-07-22

## 2019-07-22 RX ORDER — ERLOTINIB HYDROCHLORIDE 100 MG/1
100 TABLET, FILM COATED ORAL EVERY OTHER DAY
Status: DISCONTINUED | OUTPATIENT
Start: 2019-07-22 | End: 2019-07-23 | Stop reason: HOSPADM

## 2019-07-22 RX ORDER — DEXAMETHASONE SODIUM PHOSPHATE 4 MG/ML
6 INJECTION, SOLUTION INTRA-ARTICULAR; INTRALESIONAL; INTRAMUSCULAR; INTRAVENOUS; SOFT TISSUE ONCE
Status: COMPLETED | OUTPATIENT
Start: 2019-07-22 | End: 2019-07-22

## 2019-07-22 RX ORDER — PROCHLORPERAZINE MALEATE 5 MG
5 TABLET ORAL EVERY 6 HOURS PRN
Status: DISCONTINUED | OUTPATIENT
Start: 2019-07-22 | End: 2019-07-23 | Stop reason: HOSPADM

## 2019-07-22 RX ORDER — LIDOCAINE 40 MG/G
CREAM TOPICAL
Status: DISCONTINUED | OUTPATIENT
Start: 2019-07-22 | End: 2019-07-23 | Stop reason: HOSPADM

## 2019-07-22 RX ORDER — ASPIRIN 81 MG/1
81 TABLET ORAL DAILY
Status: DISCONTINUED | OUTPATIENT
Start: 2019-07-23 | End: 2019-07-23 | Stop reason: HOSPADM

## 2019-07-22 RX ORDER — PROCHLORPERAZINE 25 MG
12.5 SUPPOSITORY, RECTAL RECTAL EVERY 12 HOURS PRN
Status: DISCONTINUED | OUTPATIENT
Start: 2019-07-22 | End: 2019-07-23 | Stop reason: HOSPADM

## 2019-07-22 RX ORDER — CYCLOBENZAPRINE HCL 5 MG
5 TABLET ORAL ONCE
Status: COMPLETED | OUTPATIENT
Start: 2019-07-22 | End: 2019-07-22

## 2019-07-22 RX ORDER — LOSARTAN POTASSIUM 25 MG/1
25 TABLET ORAL DAILY
Status: DISCONTINUED | OUTPATIENT
Start: 2019-07-22 | End: 2019-07-23 | Stop reason: HOSPADM

## 2019-07-22 RX ADMIN — LOSARTAN POTASSIUM 25 MG: 25 TABLET, FILM COATED ORAL at 20:53

## 2019-07-22 RX ADMIN — OXYCODONE HYDROCHLORIDE AND ACETAMINOPHEN 1 TABLET: 5; 325 TABLET ORAL at 17:47

## 2019-07-22 RX ADMIN — CYCLOBENZAPRINE HYDROCHLORIDE 5 MG: 5 TABLET, FILM COATED ORAL at 17:22

## 2019-07-22 RX ADMIN — GABAPENTIN 100 MG: 100 CAPSULE ORAL at 20:53

## 2019-07-22 RX ADMIN — DEXAMETHASONE SODIUM PHOSPHATE 6 MG: 4 INJECTION, SOLUTION INTRAMUSCULAR; INTRAVENOUS at 20:53

## 2019-07-22 RX ADMIN — ACETAMINOPHEN 1000 MG: 500 TABLET, FILM COATED ORAL at 20:53

## 2019-07-22 ASSESSMENT — ENCOUNTER SYMPTOMS: BACK PAIN: 1

## 2019-07-22 NOTE — ED NOTES
[facility]  ED Nurse Handoff Report    Won Huggins is a 78 year old female   ED Chief complaint: Back Pain  . ED Diagnosis:   Final diagnoses:   Thoracolumbar back pain   Back muscle spasm     Allergies: No Known Allergies    Code Status: Full Code  Activity level - Baseline/Home:  Independent. Activity Level - Current:   Assist X 2-lift. Lift room needed: Yes. Bariatric: No   Needed: Yes - French speaking, family in room interpreting  Isolation: No. Infection: Not Applicable.     Vital Signs:   Vitals:    07/22/19 1636 07/22/19 1700   BP: 150/79 166/80   Pulse: 78 75   Resp: 18    Temp: 98.2  F (36.8  C)    TempSrc: Oral    SpO2: 92% 96%   Weight: 55.3 kg (122 lb)        Cardiac Rhythm:  ,      Pain level: 0-10 Pain Scale: 7  Patient confused: No. Patient Falls Risk: Yes.   Elimination Status: Has voided   Patient Report - Initial Complaint: Back pain. Focused Assessment: A&O, speaks French. C/O lower back pain. Hx of lumbar fracture, but unable to care for self at home due to pain with movement. Pt reports she does not tolerated dilaudid well. Flexeril given for pain.  (Has colostomy and also reports LLE lymphedema, which is baseline).  Tests Performed: Labs, . Abnormal Results:   Labs Ordered and Resulted from Time of ED Arrival Up to the Time of Departure from the ED   CBC WITH PLATELETS DIFFERENTIAL - Abnormal; Notable for the following components:       Result Value    MCHC 31.3 (*)     All other components within normal limits   BASIC METABOLIC PANEL     Treatments provided: Pain medication, reposition for comfort  Family Comments: daughter Vanita at bedside  OBS brochure/video discussed/provided to patient:  Yes  ED Medications:   Medications   cyclobenzaprine (FLEXERIL) tablet 5 mg (5 mg Oral Given 7/22/19 1722)     Drips infusing:  No  For the majority of the shift, the patient's behavior Green. Interventions performed were NA.    Severe Sepsis OR Septic Shock Diagnosis Present:  No    ED Nurse Name/Phone Number: Peggy Dangelo,   5:36 PM    RECEIVING UNIT ED HANDOFF REVIEW  Above ED Nurse Handoff Report was reviewed: Yes  Reviewed by: Yazmin Hyatt on July 22, 2019 at 6:34 PM

## 2019-07-22 NOTE — ED PROVIDER NOTES
History     Chief Complaint:  Back pain    HPI   Won Huggins is a 78 year old female with a history of compression fracture, lower extremity edema, cerebrovascular accident, who presents with her daughter to the emergency department with back pain. The patient's daughter reports that the patient reached down to grab something from the floor a few days ago, and noticed a resurgence of her old mid thoracic back pain. Since then she has been unable to ambulate around the house on her own, and her pain only goes away when she lays supine. She denies any recent fall, or radiation of her pain into her legs. She has been attempting to manage the pain with oxycodone and robaxin at home to no relief, and last had two this morning. She denies an issues with hypokalemia.         Allergies:  NKDA    Medications:    Roxicodone  Losartan  Voltaren  Lipitor  Aspirin  Albuterol    Past Medical History:    Cancer  CVA  HTN    Past Surgical History:    Lung lobectomy    Family History:    No past pertinent family history.    Social History:  The patient denies tobacco or alcohol use.    Review of Systems   Musculoskeletal: Positive for back pain.   All other systems reviewed and are negative.      Physical Exam     Patient Vitals for the past 24 hrs:   BP Temp Temp src Pulse Heart Rate Resp SpO2 Weight   07/22/19 1926 157/75 -- -- -- 84 16 97 % --   07/22/19 1700 166/80 -- -- 75 -- -- 96 % --   07/22/19 1636 150/79 98.2  F (36.8  C) Oral 78 78 18 92 % 55.3 kg (122 lb)           Physical Exam    Vital signs and nursing notes reviewed.     Constitutional: laying on gurney appears uncomfortable  HENT: Oropharynx is clear and moist  Eyes: Conjunctivae are normal bilaterally. Pupils equal  Neck: normal range of motion  Cardiovascular: Normal rate, regular rhythm, normal heart sounds.   Pulmonary/Chest: Effort normal and breath sounds normal. No respiratory distress.   Abdominal: Soft. Bowel sounds are normal. No tenderness to palpation. No  rebound or guarding.   Musculoskeletal: No joint swelling or edema. Paraspinal muscle tenderness long mid thoracic down to lumbar paraspinal muscles.  No soft tissue swelling or erythema overlying area of pain.  Neurological: Alert and oriented. No focal weakness in lower extremities.  Numbness reported in right leg.  Normal lower extremity DTR's.  No significant lower extremity weakness  Skin: Skin is warm and dry. No rash noted.   Psych: normal affect    Emergency Department Course     Laboratory:  CBC: WBC: 8.2, HGB: 12.2, PLT: 197  BMP: Glucose 133, o/w WNL (Creatinine: 0.7)    Interventions:    1722 Flexeril 5 mg Oral  1747 Percocet 5-325 mg 1 tablet oral      Emergency Department Course:  Nursing notes and vitals reviewed. (1658) I performed an exam of the patient as documented above.     Medicine administered as documented above. Blood drawn. This was sent to the lab for further testing, results above.    (0027)  I consulted with Peggy Constantino PA-C of the hospitalist services. They are in agreement to accept the patient for admission.    Findings and plan explained to the Patient who consents to admission. Discussed the patient with Peggy Constantino PA-C, who will admit the patient to a inpatient bed for further monitoring, evaluation, and treatment.      Impression & Plan      Medical Decision Making:  Won Huggins is a 78 year old female who presents today for persistent and uncontrolled pain at home after being evaluated a few days ago in the ED. At that time she had a CT of thoracic lumbar spine which did not show an obvious acute fracture. Patient was given medication including muscle relaxants, narcotics, as well as lidocaine patches but has been having fairly still persistent fairly severe pain at home, and where patient is requiring two person assistance. On examination, she does not appear to have any suggestion of dangerous spinal cord compressive symptoms, particularly cauda equina. She has normal vascular  exam of lower extremities. There has not been any recent falls or other injury since her last evaluation and I don't feel she needs immediate repeat imaging at this time but based on her failing outpatient management despite maximal treatment, I felt she should be admitted to the hospital for further evaluation for possible physical therapy as well as pain management. Patient and family agree with plan, I spoke with hospitalist services who agrees to accept patient.       Diagnosis:    ICD-10-CM   1. Thoracolumbar back pain M54.5    M54.6   2. Back muscle spasm M62.830       Disposition:  Admitted to inpatient Bed under care of HOLLI Constantino    Scribe Disclosure:  I, Gagan Hodges, am serving as a scribe on 7/22/2019 at 4:58 PM to personally document services performed by Petar Samuel MD based on my observations and the provider's statements to me.     Gagan Hodges  7/22/2019   [facility] EMERGENCY DEPARTMENT       Petar Samuel MD  07/22/19 1274

## 2019-07-22 NOTE — ED TRIAGE NOTES
Pt presents to ED via EMS for back pain. Pt seen recently and lumbar fracture was noted at that time. At home unable to tolerate pain despite taking oxycodone, robaxin and lidocaine patches. Needing max assist by family to get to restroom. Pain 10/10 with movement. 5-7/10 at rest. Last oxy and robaxin at 2 PM. Lidocaine patch currently in place. Fentanyl 25 mcg given by EMS.

## 2019-07-22 NOTE — PHARMACY-ADMISSION MEDICATION HISTORY
Admission medication history interview status for this patient is complete. See [facility] admission navigator for allergy information, prior to admission medications and immunization status.     Medication history interview source(s):Patient and Family  Medication history resources (including written lists, pill bottles, clinic record):None  [facility]    Changes made to PTA medication list:  Added: -  Deleted: - voltaren gel  Changed: -    Actions taken by pharmacist (provider contacted, etc):None     Additional medication history information:None    Medication reconciliation/reorder completed by provider prior to medication history? No    Do you take OTC medications (eg tylenol, ibuprofen, fish oil, eye/ear drops, etc)? yes(Y/N)    For patients on insulin therapy: no (Y/N)  Lantus/levemir/NPH/Mix 70/30 dose:   (Y/N) (see Med list for doses)   Sliding scale Novolog Y/N  If Yes, do you have a baseline novolog pre-meal dose:  units with meals  Patients eat three meals a day:   Y/N    How many episodes of hypoglycemia do you have per week: _______  How many missed doses do you have per week: ______  How many times do you check your blood glucose per day: _______  Do you have a Continuous glucose monitor (CGM)   Y/N (remind pt that not approved for hospital use)   Any Barriers to therapy - Be specific :  cost of medications, comfortable with giving injections (if applicable), comfortable and confident with current diabetes regimen: Y/N ______________      Prior to Admission medications    Medication Sig Last Dose Taking? Auth Provider   Acetaminophen (TYLENOL PO) Take 325-650 mg by mouth every 4 hours as needed for mild pain or fever  Yes Unknown, Entered By History   ASPIRIN EC PO Take 81 mg by mouth daily 7/22/2019 at Unknown time Yes Unknown, Entered By History   atorvastatin (LIPITOR) 10 MG tablet Take 1 tablet (10 mg) by mouth daily 7/22/2019 at Unknown time Yes Beka Flores MD   cetirizine  (ZYRTEC) 10 MG tablet Take 10 mg by mouth daily as needed   Yes Unknown, Entered By History   erlotinib (TARCEVA) 100 MG TABS Take 100 mg by mouth every other day  7/22/2019 at Unknown time Yes Unknown, Entered By History   fish oil-omega-3 fatty acids 1000 MG capsule Take 1 g by mouth daily 7/22/2019 at Unknown time Yes Unknown, Entered By History   fluticasone (FLONASE) 50 MCG/ACT spray Spray 2 sprays into both nostrils daily as needed for rhinitis or allergies  Yes Unknown, Entered By History   Ginsengs-Royal Jelly-Vit B12 (GINSENG ROYAL JELLY PLUS PO) Take 1 capsule by mouth daily Chinese OTC med  7/22/2019 at Unknown time Yes Unknown, Entered By History   guaiFENesin-codeine (ROBITUSSIN AC) 100-10 MG/5ML solution Take 1-2 tsp. by mouth every 6 hours as needed for cough  Yes Unknown, Entered By History   hydrochlorothiazide (MICROZIDE) 12.5 MG capsule Take 12.5 mg by mouth daily  7/22/2019 at Unknown time Yes Reported, Patient   Lidocaine-Menthol 3.99-1 % PTCH Externally apply topically daily as needed  Yes Unknown, Entered By History   LOSARTAN POTASSIUM PO Take 25 mg by mouth daily  7/21/2019 at Unknown time Yes Reported, Patient   methocarbamol (ROBAXIN) 500 MG tablet Take 1.5 tablets (750 mg) by mouth 3 times daily as needed for muscle spasms 7/22/2019 at 1400 Yes Aminah Whalen MD   oxyCODONE (ROXICODONE) 5 MG tablet Take 0.5-1 tablets (2.5-5 mg) by mouth every 6 hours as needed for pain 7/22/2019 at 1400 Yes Aminah Whalen MD   VITAMIN D, CHOLECALCIFEROL, PO Take 1,000 Units by mouth daily  7/21/2019 at Unknown time Yes Unknown, Entered By History   albuterol (PROAIR HFA/PROVENTIL HFA/VENTOLIN HFA) 108 (90 BASE) MCG/ACT Inhaler Inhale 1-2 puffs into the lungs every 4 hours as needed for shortness of breath / dyspnea or wheezing   Unknown, Entered By History

## 2019-07-22 NOTE — H&P
[facility] Outpatient / Observation Unit  History and Physical Exam     Won Huggins MRN# [id number]   YOB: 1940 Age: 78 year old      Date of Admission:  7/22/2019    Primary care provider: Kayla Delgadillo          Assessment:   Won Huggins is a Ecuadorean-speaking 78 year old female with a PMH significant for HTN, osteopenia, adenocarcinoma of the lung s/p lobectomy now on palliative chemotherapy (Tarceva), remote history of anal cancer (many years ago, s/p colostomy, inguinal node radiation in 2004), HTN, history of pulmonary embolism, chronic left lower extremity lymphedema, chronic compression fracture of T11 due to osteoporosis, previous CVA without significant residual deficit, who presents with acute back pain.     Work up in ED reveals: T 98.2 F, P 78, /79, RR 18, SpO2 92% on room air.  CBC with differential showed normal white count of 8.2, stable hemoglobin of 12.2, and normal platelet count of 197.  BMP was within normal limits including Cr 0.7; non-fasting blood glucose of 133.  Recent UA on 7/19/2019 was negative for nitrites, blood, or leukocyte esterase.  Recent CT imaging of thoracic and lumbar spine obtained on 7/19/2019 showed compression fracture deformities in the thoracic spine similar to imaging on 7/9/2019 and of the lumbar spine dating back to 10/1/2017.  Chronic T11 compression fracture with complete loss of anterior vertebral body height and 3 mm retropulsion of fracture fragments resulting in mild spinal canal stenosis.  Chronic L1 superior endplate compression fracture with superimposed superior endplate Schmorl's node deformity noted as well as a chronic L2 compression fracture with complete loss of anterior vertebral body height and 5 mm retropulsion of fracture fragments resulting in moderate spinal canal stenosis.  No acute fracture progressive subluxation was noted.    Patient is being registered to observation for further evaluation and continue supportive therapy.    1. Acute  back pain: Patient was evaluated in ER on 7/19/2019 after bending to open a drawer and when she stood up having acute diffuse back pain. CT imaging during that evaluation showed stable chronic compression fractures at T11, L1, and L2 with retropulsed fracture fragments resulting in moderate spinal canal stenosis at L2 and mild spinal canal stenosis at T11. Compression fractures have historically been attributed to osteopenia. Observation was offered, but patient was able to ambulate in the ER and preferred to discharge home.  She was sent home with instructions to schedule Tylenol and given scripts of Robaxin and oxycodone.  Over the weekend her pain was very poorly controlled, and she has been requiring max assist of 2 by family members for mobility.  No radicular symptoms into either leg or red flag symptoms of sensory/motor loss, bowel/bladder dysfunction, or fever.  She has not had any falls or other injuries between her evaluation 3 days ago and today.  She was planning to come to her primary care clinic for follow-up today, but was in too much pain to be able to make it there and instead called an ambulance for transport.  -Discontinue PTA Robaxin and replaced with Flexeril 5 mg 3 times daily as needed.  -Continue PTA oxycodone 2.5 to 5 mg every 4 hours as needed rather than every 6 hours  -Give IV Decadron 6 mg this afternoon and another 6 mg tomorrow a.m. consider addition of switching to p.o. Prednisone burst vs Medrol Dosepak if improved pain tomorrow AM  -Schedule Tylenol 1 g 3 times daily  -Start gabapentin 100 mg 3 times daily  -Lidoderm patch PRN  -No NSAIDs started given age  -IV dilaudid for breakthrough pain  -Orthotics consult  -PT consult  -If inadequate pain management, could consider neurosurgery consult    2. Recurrent adenocarcinoma of lung: S/p resection in 12/2010 without adjuvant therapy at that time. Per oncology note in 7/2018, had metastatic recurrence with loculated and bloody pleural  effusion in 2013. Now on Tarceva 100 mg every other day. Goal of treatment is palliative with aim to alleviate/prevent symptoms and prolong survival, but without expectation of cure. Follows with oncologist Dr. Garcia annually with CT CAP and labs. Most recent CT CAP in 2018 showed no evidence of disease recurrence, but oncologist expects she has microscopic residual disease.    3. History of anal cancer: In complete remission s/p resection, had inguinal node recurrence that was was biopsy-proven, treated radiation therapy (2004).    4. History of pulmonary emboli: From chart review, appears it was related to malignancy. Her oncologist discontinued Coumadin and started her on aspirin. No recurrent PE since then.    5. Chronic left lower extremity lymphedema: S/p radiation in 2004 for inguinal node malignancy associated with anal cancer.    6. HTN: Continue PTA hydrochlorothiazide 12.5 mg daily and losartan 25 mg daily with hold parameters.    7. Previous CVA: Continue PTA aspirin 81 mg daily and atorvastatin 10 mg daily.         Plan:     1. [place] to Observation  2. Neuro checks Q4 and serial pain assessments  3. Initiate scheduled Tylenol, scheduled gabapentin 100 mg 3 times daily, Lidoderm patches, PRN oxycodone, PRN Flexeril, IV Decadron, PRN IV Dilaudid  4. Orthotics consult  5. Encourage ambulation, PT consult   6. DVT prophylaxis: pt at low risk, encourage ambulation                Chief Complaint:   Back pain         History of Present Illness:   Won Huggins is a Thai-speaking 78 year old female with a PMH significant for HTN, osteopenia, adenocarcinoma of the lung s/p lobectomy now on palliative chemotherapy (Tarceva), remote history of anal cancer (many years ago, s/p colostomy, inguinal node radiation in 2004), HTN, history of pulmonary embolism, chronic left lower extremity lymphedema, chronic compression fracture of T11 due to osteoporosis, previous CVA without significant residual deficit, who  presents with acute back pain.     The patient reportedly bent over while trying to pull open a drawer about 1 week ago and when she stood back up had some generalized acute back pain.  That pain worsened throughout the week, and 3 days ago her daughters brought her to the ER for further evaluation. CT imaging during that evaluation showed stable chronic compression fractures at T11, L1, and L2 with retropulsed fracture fragments resulting in moderate spinal canal stenosis at L2 and mild spinal canal stenosis at T11. Compression fractures have historically been attributed to osteopenia. Observation was offered, but patient was able to ambulate in the ER and preferred to discharge home.  She was sent home with instructions to schedule Tylenol and given scripts of Robaxin and oxycodone.  Over the weekend her pain was very poorly controlled, and she has been requiring max assist of 2 by family members for mobility.  No radicular symptoms into either leg or red flag symptoms of sensory/motor loss, bowel/bladder dysfunction, or fever.  She has not had any falls or other injuries between her evaluation 3 days ago and today.  He was planning to come to her primary care clinic for follow-up today, but was in too much pain to be able to make it there and instead called an ambulance for transport.    Work up in ED reveals: T 98.2 F, P 78, /79, RR 18, SpO2 92% on room air.  CBC with differential showed normal white count of 8.2, stable hemoglobin of 12.2, and normal platelet count of 197.  BMP was within normal limits including Cr 0.7; non-fasting blood glucose of 133.  Recent UA on 7/19/2019 was negative for nitrites, blood, or leukocyte esterase.  Recent CT imaging of thoracic and lumbar spine obtained on 7/19/2019 showed compression fracture deformities in the thoracic spine similar to imaging on 7/9/2019 and of the lumbar spine dating back to 10/1/2017.  Chronic T11 compression fracture with complete loss of anterior  vertebral body height and 3 mm retropulsion of fracture fragments resulting in mild spinal canal stenosis.  Chronic L1 superior endplate compression fracture with superimposed superior endplate Schmorl's node deformity noted as well as a chronic L2 compression fracture with complete loss of anterior vertebral body height and 5 mm retropulsion of fracture fragments resulting in moderate spinal canal stenosis.  No acute fracture progressive subluxation was noted.             Past Medical History:     Past Medical History:   Diagnosis Date     Cancer (H)      CVA (cerebral infarction)      Hypertension                Past Surgical History:     Past Surgical History:   Procedure Laterality Date     BIOPSY       LOBECTOMY LUNG                 Social History:     Social History     Socioeconomic History     Marital status:      Spouse name: Not on file     Number of children: Not on file     Years of education: Not on file     Highest education level: Not on file   Occupational History     Not on file   Social Needs     Financial resource strain: Not on file     Food insecurity:     Worry: Not on file     Inability: Not on file     Transportation needs:     Medical: Not on file     Non-medical: Not on file   Tobacco Use     Smoking status: Never Smoker   Substance and Sexual Activity     Alcohol use: No     Drug use: Not on file     Sexual activity: Not on file   Lifestyle     Physical activity:     Days per week: Not on file     Minutes per session: Not on file     Stress: Not on file   Relationships     Social connections:     Talks on phone: Not on file     Gets together: Not on file     Attends Judaism service: Not on file     Active member of club or organization: Not on file     Attends meetings of clubs or organizations: Not on file     Relationship status: Not on file     Intimate partner violence:     Fear of current or ex partner: Not on file     Emotionally abused: Not on file     Physically abused:  Not on file     Forced sexual activity: Not on file   Other Topics Concern     Not on file   Social History Narrative     Not on file               Family History:   No family history on file.           Allergies:    No Known Allergies            Medications:     Prior to Admission medications    Medication Sig Last Dose Taking? Auth Provider   Acetaminophen (TYLENOL PO) Take 325-650 mg by mouth every 4 hours as needed for mild pain or fever  Yes Unknown, Entered By History   ASPIRIN EC PO Take 81 mg by mouth daily 7/22/2019 at Unknown time Yes Unknown, Entered By History   atorvastatin (LIPITOR) 10 MG tablet Take 1 tablet (10 mg) by mouth daily 7/22/2019 at Unknown time Yes Beka Flores MD   cetirizine (ZYRTEC) 10 MG tablet Take 10 mg by mouth daily as needed   Yes Unknown, Entered By History   erlotinib (TARCEVA) 100 MG TABS Take 100 mg by mouth every other day  7/22/2019 at Unknown time Yes Unknown, Entered By History   fish oil-omega-3 fatty acids 1000 MG capsule Take 1 g by mouth daily 7/22/2019 at Unknown time Yes Unknown, Entered By History   fluticasone (FLONASE) 50 MCG/ACT spray Spray 2 sprays into both nostrils daily as needed for rhinitis or allergies  Yes Unknown, Entered By History   Ginsengs-Royal Jelly-Vit B12 (GINSENG ROYAL JELLY PLUS PO) Take 1 capsule by mouth daily Chinese OTC med  7/22/2019 at Unknown time Yes Unknown, Entered By History   guaiFENesin-codeine (ROBITUSSIN AC) 100-10 MG/5ML solution Take 1-2 tsp. by mouth every 6 hours as needed for cough  Yes Unknown, Entered By History   hydrochlorothiazide (MICROZIDE) 12.5 MG capsule Take 12.5 mg by mouth daily  7/22/2019 at Unknown time Yes Reported, Patient   Lidocaine-Menthol 3.99-1 % PTCH Externally apply topically daily as needed  Yes Unknown, Entered By History   LOSARTAN POTASSIUM PO Take 25 mg by mouth daily  7/21/2019 at Unknown time Yes Reported, Patient   methocarbamol (ROBAXIN) 500 MG tablet Take 1.5 tablets (750 mg) by mouth 3  times daily as needed for muscle spasms 7/22/2019 at 1400 Yes Aminah Whalen MD   oxyCODONE (ROXICODONE) 5 MG tablet Take 0.5-1 tablets (2.5-5 mg) by mouth every 6 hours as needed for pain 7/22/2019 at 1400 Yes Aminah Whalen MD   VITAMIN D, CHOLECALCIFEROL, PO Take 1,000 Units by mouth daily  7/21/2019 at Unknown time Yes Unknown, Entered By History   albuterol (PROAIR HFA/PROVENTIL HFA/VENTOLIN HFA) 108 (90 BASE) MCG/ACT Inhaler Inhale 1-2 puffs into the lungs every 4 hours as needed for shortness of breath / dyspnea or wheezing   Unknown, Entered By History              Review of Systems:   A Comprehensive greater than 10 system review of systems was carried out.  Pertinent positives and negatives are noted above.  Otherwise negative for contributory information.            Physical Exam:   Blood pressure 166/80, pulse 75, temperature 98.2  F (36.8  C), temperature source Oral, resp. rate 18, weight 55.3 kg (122 lb), SpO2 96 %, unknown if currently breastfeeding.    GENERAL: healthy, alert and no distress  EYES: Eyes grossly normal to inspection, extraocular movements - intact, and PERRL  HENT: ear canals- normal; TMs- normal; Nose- normal; Mouth- no ulcers, no lesions  NECK: no tenderness, no adenopathy, no asymmetry, no masses, no stiffness; thyroid- normal to palpation  RESP: lungs clear to auscultation - no rales, no rhonchi, no wheezes  CV: regular rates and rhythm, normal S1 S2, no S3 or S4, no murmur, click or rub and no irregular beats  ABDOMEN: soft, no tenderness, no  hepatosplenomegaly, no masses, normal bowel sounds  MS: extremities-pain to palpation of bilateral lumbar and lower thoracic paraspinous musculature, pain to palpation over lower thoracic and upper lumbar spine.  Able to logroll without significant discomfort, but flexion or twisting is very painful.  Able to actively raise both legs off the bed with some pain in the back but no radicular pain into the  buttock or lower extremities.  SKIN: no suspicious lesions, no rashes  NEURO: Gait not assessed secondary to pain. Reflexes normal and symmetric. Sensation and strength grossly WNL.  PSYCH: Alert and oriented times 3; Affect is normal. Sleepy.          Data:     Results for orders placed or performed during the hospital encounter of 07/22/19   Basic metabolic panel   Result Value Ref Range    Sodium 137 133 - 144 mmol/L    Potassium 3.5 3.4 - 5.3 mmol/L    Chloride 102 94 - 109 mmol/L    Carbon Dioxide 32 20 - 32 mmol/L    Anion Gap 4 3 - 14 mmol/L    Glucose 133 (H) 70 - 99 mg/dL    Urea Nitrogen 16 7 - 30 mg/dL    Creatinine 0.70 0.52 - 1.04 mg/dL    GFR Estimate 83 >60 mL/min/[1.73_m2]    GFR Estimate If Black >90 >60 mL/min/[1.73_m2]    Calcium 9.0 8.5 - 10.1 mg/dL   CBC with platelets differential   Result Value Ref Range    WBC 8.2 4.0 - 11.0 10e9/L    RBC Count 4.08 3.8 - 5.2 10e12/L    Hemoglobin 12.2 11.7 - 15.7 g/dL    Hematocrit 39.0 35.0 - 47.0 %    MCV 96 78 - 100 fl    MCH 29.9 26.5 - 33.0 pg    MCHC 31.3 (L) 31.5 - 36.5 g/dL    RDW 12.6 10.0 - 15.0 %    Platelet Count 197 150 - 450 10e9/L    Diff Method Automated Method     % Neutrophils 75.0 %    % Lymphocytes 13.7 %    % Monocytes 9.1 %    % Eosinophils 1.6 %    % Basophils 0.2 %    % Immature Granulocytes 0.4 %    Nucleated RBCs 0 0 /100    Absolute Neutrophil 6.2 1.6 - 8.3 10e9/L    Absolute Lymphocytes 1.1 0.8 - 5.3 10e9/L    Absolute Monocytes 0.8 0.0 - 1.3 10e9/L    Absolute Eosinophils 0.1 0.0 - 0.7 10e9/L    Absolute Basophils 0.0 0.0 - 0.2 10e9/L    Abs Immature Granulocytes 0.0 0 - 0.4 10e9/L    Absolute Nucleated RBC 0.0        Peggy Constantino PA-C

## 2019-07-23 ENCOUNTER — APPOINTMENT (OUTPATIENT)
Dept: PHYSICAL THERAPY | Facility: CLINIC | Age: 79
End: 2019-07-23
Attending: PHYSICIAN ASSISTANT
Payer: COMMERCIAL

## 2019-07-23 VITALS
RESPIRATION RATE: 16 BRPM | TEMPERATURE: 97.5 F | DIASTOLIC BLOOD PRESSURE: 84 MMHG | OXYGEN SATURATION: 98 % | HEART RATE: 58 BPM | SYSTOLIC BLOOD PRESSURE: 156 MMHG | WEIGHT: 122 LBS | BODY MASS INDEX: 27.35 KG/M2

## 2019-07-23 LAB
ANION GAP SERPL CALCULATED.3IONS-SCNC: 7 MMOL/L (ref 3–14)
BASOPHILS # BLD AUTO: 0 10E9/L (ref 0–0.2)
BASOPHILS NFR BLD AUTO: 0 %
BUN SERPL-MCNC: 15 MG/DL (ref 7–30)
CALCIUM SERPL-MCNC: 9.5 MG/DL (ref 8.5–10.1)
CHLORIDE SERPL-SCNC: 103 MMOL/L (ref 94–109)
CO2 SERPL-SCNC: 29 MMOL/L (ref 20–32)
CREAT SERPL-MCNC: 0.58 MG/DL (ref 0.52–1.04)
DIFFERENTIAL METHOD BLD: ABNORMAL
EOSINOPHIL # BLD AUTO: 0 10E9/L (ref 0–0.7)
EOSINOPHIL NFR BLD AUTO: 0 %
ERYTHROCYTE [DISTWIDTH] IN BLOOD BY AUTOMATED COUNT: 12.5 % (ref 10–15)
GFR SERPL CREATININE-BSD FRML MDRD: 88 ML/MIN/{1.73_M2}
GLUCOSE SERPL-MCNC: 138 MG/DL (ref 70–99)
HCT VFR BLD AUTO: 43.7 % (ref 35–47)
HGB BLD-MCNC: 14.1 G/DL (ref 11.7–15.7)
IMM GRANULOCYTES # BLD: 0 10E9/L (ref 0–0.4)
IMM GRANULOCYTES NFR BLD: 0.5 %
LYMPHOCYTES # BLD AUTO: 0.8 10E9/L (ref 0.8–5.3)
LYMPHOCYTES NFR BLD AUTO: 21.3 %
MCH RBC QN AUTO: 30.4 PG (ref 26.5–33)
MCHC RBC AUTO-ENTMCNC: 32.3 G/DL (ref 31.5–36.5)
MCV RBC AUTO: 94 FL (ref 78–100)
MONOCYTES # BLD AUTO: 0.1 10E9/L (ref 0–1.3)
MONOCYTES NFR BLD AUTO: 1.8 %
NEUTROPHILS # BLD AUTO: 3 10E9/L (ref 1.6–8.3)
NEUTROPHILS NFR BLD AUTO: 76.4 %
NRBC # BLD AUTO: 0 10*3/UL
NRBC BLD AUTO-RTO: 0 /100
PLATELET # BLD AUTO: 210 10E9/L (ref 150–450)
POTASSIUM SERPL-SCNC: 4.2 MMOL/L (ref 3.4–5.3)
RBC # BLD AUTO: 4.64 10E12/L (ref 3.8–5.2)
SODIUM SERPL-SCNC: 139 MMOL/L (ref 133–144)
WBC # BLD AUTO: 3.9 10E9/L (ref 4–11)

## 2019-07-23 PROCEDURE — L0625 LO FLEX L1-BELOW L5 PRE OTS: HCPCS

## 2019-07-23 PROCEDURE — 97530 THERAPEUTIC ACTIVITIES: CPT | Mod: GP | Performed by: PHYSICAL THERAPIST

## 2019-07-23 PROCEDURE — 97161 PT EVAL LOW COMPLEX 20 MIN: CPT | Mod: GP | Performed by: PHYSICAL THERAPIST

## 2019-07-23 PROCEDURE — 80048 BASIC METABOLIC PNL TOTAL CA: CPT | Performed by: PHYSICIAN ASSISTANT

## 2019-07-23 PROCEDURE — 25000132 ZZH RX MED GY IP 250 OP 250 PS 637: Performed by: PHYSICIAN ASSISTANT

## 2019-07-23 PROCEDURE — 85025 COMPLETE CBC W/AUTO DIFF WBC: CPT | Performed by: PHYSICIAN ASSISTANT

## 2019-07-23 PROCEDURE — G0378 HOSPITAL OBSERVATION PER HR: HCPCS

## 2019-07-23 PROCEDURE — 25000128 H RX IP 250 OP 636: Performed by: PHYSICIAN ASSISTANT

## 2019-07-23 PROCEDURE — 99217 ZZC OBSERVATION CARE DISCHARGE: CPT | Performed by: HOSPITALIST

## 2019-07-23 PROCEDURE — 96376 TX/PRO/DX INJ SAME DRUG ADON: CPT

## 2019-07-23 PROCEDURE — 36415 COLL VENOUS BLD VENIPUNCTURE: CPT | Performed by: PHYSICIAN ASSISTANT

## 2019-07-23 RX ORDER — OXYCODONE HYDROCHLORIDE 5 MG/1
2.5-5 TABLET ORAL EVERY 6 HOURS PRN
Qty: 15 TABLET | Refills: 0 | Status: SHIPPED | OUTPATIENT
Start: 2019-07-23

## 2019-07-23 RX ORDER — CYCLOBENZAPRINE HCL 5 MG
5 TABLET ORAL 3 TIMES DAILY PRN
Qty: 15 TABLET | Refills: 0 | Status: SHIPPED | OUTPATIENT
Start: 2019-07-23

## 2019-07-23 RX ORDER — POLYETHYLENE GLYCOL 3350 17 G/17G
17 POWDER, FOR SOLUTION ORAL DAILY PRN
COMMUNITY
Start: 2019-07-23

## 2019-07-23 RX ORDER — AMOXICILLIN 250 MG
1 CAPSULE ORAL DAILY PRN
Qty: 15 TABLET | Refills: 0 | Status: SHIPPED | OUTPATIENT
Start: 2019-07-23

## 2019-07-23 RX ADMIN — HYDROCHLOROTHIAZIDE 12.5 MG: 12.5 CAPSULE ORAL at 08:26

## 2019-07-23 RX ADMIN — ATORVASTATIN CALCIUM 10 MG: 10 TABLET, FILM COATED ORAL at 08:26

## 2019-07-23 RX ADMIN — CYCLOBENZAPRINE HYDROCHLORIDE 10 MG: 10 TABLET, FILM COATED ORAL at 08:36

## 2019-07-23 RX ADMIN — ACETAMINOPHEN 1000 MG: 500 TABLET, FILM COATED ORAL at 12:32

## 2019-07-23 RX ADMIN — LOSARTAN POTASSIUM 25 MG: 25 TABLET, FILM COATED ORAL at 08:26

## 2019-07-23 RX ADMIN — DEXAMETHASONE SODIUM PHOSPHATE 6 MG: 4 INJECTION, SOLUTION INTRAMUSCULAR; INTRAVENOUS at 08:26

## 2019-07-23 RX ADMIN — LIDOCAINE 1 PATCH: 560 PATCH PERCUTANEOUS; TOPICAL; TRANSDERMAL at 08:27

## 2019-07-23 RX ADMIN — GABAPENTIN 100 MG: 100 CAPSULE ORAL at 08:26

## 2019-07-23 RX ADMIN — ASPIRIN 81 MG: 81 TABLET, COATED ORAL at 08:26

## 2019-07-23 NOTE — PLAN OF CARE
Acute Traumatic Pain     1.  Pain controlled with oral analgesia: Yes      2.  Vital signs stable: Yes      3.  Diagnotic testing complete: Yes      4.  Cleared from consultants (if applicable): No- PT and orthodics to see pt       5. Return to near baseline physical activity: No    Pt is Citizen of [place] speaking-  here till noon. Granddaughter present. Pt has ostomy- only gas present. Pt x2 with gb and walker.PT later today and orthodics consult for brace. Patient getting scheduled tylenol and lidoderm patches. Prn flexiril and oxy. Pt given flexiril this morning with morning meds. Purewick in place.

## 2019-07-23 NOTE — PLAN OF CARE
PRIMARY DIAGNOSIS: Back Pain  OUTPATIENT/OBSERVATION GOALS TO BE MET BEFORE DISCHARGE:  1. Pain Status: Controlled at rest with PO medications.     2. Return to near baseline physical activity: No - has not been up yet.    3. Cleared for discharge by consultants (if involved): No    Discharge Planner Nurse   Safe discharge environment identified: Yes  Barriers to discharge: Yes - PT consult, increase mobility       Entered by: Yazmin Hyatt 07/22/2019        VSS. Denies pain at rest, grimaces with movement. Scheduled tylenol & gabapentin given. Received percocet and flexeril in ED. Lidocaine patches from PTA removed at 1930. LLE lymphedema at baseline per daughter. BLE neuro intact; denies numbness and tingling. Decadron given, will receive additional dose in AM. Ostomy intact. Chemo precautions maintained. Georgian speaking; daughter translating. Voicemail left with  services requesting an  from 8a-12p. Has not been OOB. PT consulted.      Please review provider order for any additional goals.   Nurse to notify provider when observation goals have been met and patient is ready for discharge.

## 2019-07-23 NOTE — PLAN OF CARE
ROOM # 221    Living Situation (if not independent, order SW consult): Home with Daughter, has PCA   Facility name:  : [facility] (number on board) or [facility] (number in chart)    Activity level at baseline: Ind/SBA w/ walker  Activity level on admit: Ax2/lift (has not been up yet)    Patient registered to observation; given Patient Bill of Rights; given the opportunity to ask questions about observation status and their plan of care.  Patient has been oriented to the observation room, bathroom and call light is in place.    Discussed discharge goals and expectations with patient/family.

## 2019-07-23 NOTE — PLAN OF CARE
PRIMARY DIAGNOSIS: ACUTE PAIN  OUTPATIENT/OBSERVATION GOALS TO BE MET BEFORE DISCHARGE:  1. Pain Status: Improved-controlled with oral pain medications.    2. Return to near baseline physical activity: No    3. Cleared for discharge by consultants (if involved): No    Discharge Planner Nurse   Safe discharge environment identified: No  Barriers to discharge: No       Entered by: Kashif Dorantes 07/23/2019 6:30 AM   Afebrile. Patient sleeping but denied pain when woke.  BP elevated.  Monitor and manage pain this morning.

## 2019-07-23 NOTE — PLAN OF CARE
PRIMARY DIAGNOSIS: ACUTE PAIN  OUTPATIENT/OBSERVATION GOALS TO BE MET BEFORE DISCHARGE:  1. Pain Status: Improved-controlled with oral pain medications.    2. Return to near baseline physical activity: No    3. Cleared for discharge by consultants (if involved): No    Discharge Planner Nurse   Safe discharge environment identified: No  Barriers to discharge: No       Entered by: Kashif Dorantes 07/23/2019 3:22 AM   Afebrile. Patient sleeping but denied pain when woke.  BP elevated.  Monitor and manage pain overnight.

## 2019-07-23 NOTE — PROGRESS NOTES
Discharge Planner   Discharge Plans in progress: Yes. Patient discharging to home with daughter and home care for PT and OT. Spoke with daughter, Vanita, who is agreeable to PT and OT. She would like [facility]. A referral was sent to [facility] and information updated on AVS. Daughter prefers calls to [phone] for scheduling.   Barriers to discharge plan: none anticipated.  Follow up plan: CTS will cont to follow  for discharge planning as needed.       Entered by: Guevara Constantino 07/23/2019 1:03 PM

## 2019-07-23 NOTE — PLAN OF CARE
PT: PT shannon completed. Pt here status post compression fractures in lumbar spine. Pt lives with dtg, grand dtg and spouse in 2 level home but stays on the main floor with 1 stair to enter. Pt has PCA services 4 days per week for 2 hours. Pt also attends a adult care program [facility]. Pt has assist with showering with shower chair, stairs with assist of 2, and ambulates with 4WW. Pt typically able to perform bed mob, transfers, toileting and dressing on her own.   Discharge Planner PT   Patient plan for discharge: Hoping to discharge home with home PT, OT, RN  Current status: Pt was cued for supine to sit, min A x 2 education on log rolling. Orthotics present to assist with donning of new lumbar corset. Pt was cued for sit to stand with FWW. Pt able to perform this with CGA x 2. Pt was cued for ambulation tolerating this well, 50 feet. Pt was cued for stairs, min A x 2 for performing 1 stair, very difficult time clearing her L foot for this and significant pain with step up. Pt was cued for return to supine, min A x 2 reinforcing log roll technique.   Barriers to return to prior living situation: needing A x 2 for mobilization,   Recommendations for discharge: TCU, unless family able to mobilize pt with A x 2 naa with bed mobility, recommend home PT, OT, transfer chair and youth FWW walker  Rationale for recommendations: Pt currently needing A x 2 for bed mobility, but tolerating fair amount of ambulation and was able to complete a stair. Pt would benefit from TCU to improve her bed mob and tolerance to activity. But if family able to provide A X 2 for bed mobility and stairs could feasible DISCHARGE home with home cares.        Entered by: Brenna Haynes 07/23/2019 1:54 PM

## 2019-07-23 NOTE — PROGRESS NOTES
Patient's After Visit Summary was reviewed with patient and/or family.   Patient verbalized understanding of After Visit Summary, recommended follow up and was given an opportunity to ask questions.   Discharge medications sent home with patient/family: YES   Discharged with daughter    OBSERVATION patient END time: 1431

## 2019-07-23 NOTE — DISCHARGE INSTRUCTIONS
Your home care referral was sent to [facility].  If you haven't heard from them within the next 24-48 hours,  Please call them at [phone]

## 2019-07-23 NOTE — PROGRESS NOTES
07/23/19 1102   Quick Adds   Type of Visit Initial PT Evaluation       Present yes   Language vietmanse   Living Environment   Lives With child(thais), adult;spouse   Living Arrangements house   Home Accessibility stairs to enter home   Number of Stairs, Main Entrance 1   Stair Railings, Main Entrance none   Transportation Anticipated family or friend will provide   Living Environment Comment Pt lives with granddaughter, dtg and .   Self-Care   Usual Activity Tolerance moderate   Current Activity Tolerance fair   Regular Exercise Yes   Activity/Exercise Type strength training  (Adult day program)   Exercise Amount/Frequency 3-5 times/wk  (MWF)   Equipment Currently Used at Home shower chair;walker, rolling   Functional Level Prior   Ambulation 1-->assistive equipment   Transferring 1-->assistive equipment   Toileting 1-->assistive equipment   Bathing 3-->assistive equipment and person   Communication 0-->understands/communicates without difficulty   Swallowing 0-->swallows foods/liquids without difficulty   Cognition 0 - no cognition issues reported   Fall history within last six months yes   Number of times patient has fallen within last six months 1   Prior Functional Level Comment Pt typically able to dress herself but since the back pain in the past 2 weeks she has been needing help. Pt typically has help with showers even before back pain. Pt has a PCA for 2 hours 4 days per week. Pt reports she has had this kind of help a few years.    General Information   Onset of Illness/Injury or Date of Surgery - Date 07/22/19   Referring Physician Vira   Patient/Family Goals Statement Pt hoping to DC home with family   Pertinent History of Current Problem (include personal factors and/or comorbidities that impact the POC) Te Huggins is a Latvian-speaking 78 year old female with a PMH significant for HTN, osteopenia, adenocarcinoma of the lung s/p lobectomy now on palliative chemotherapy  (Tarceva), remote history of anal cancer (many years ago, s/p colostomy, inguinal node radiation in 2004), HTN, history of pulmonary embolism, chronic left lower extremity lymphedema, chronic compression fracture of T11 due to osteoporosis, previous CVA without significant residual deficit, who presents with acute back pain   Precautions/Limitations fall precautions;spinal precautions   Cognitive Status Examination   Orientation person;place   Level of Consciousness alert   Follows Commands and Answers Questions 100% of the time;able to follow multistep instructions   Personal Safety and Judgment intact   Pain Assessment   Patient Currently in Pain Yes, see Vital Sign flowsheet  (R sided back pain )   Integumentary/Edema   Integumentary/Edema Comments L LE edema noted   Posture    Posture Kyphosis   Range of Motion (ROM)   ROM Comment limited ROM in spine   Strength   Strength Comments 3-/5 B LEs   Bed Mobility   Bed Mobility Comments min A x 2   Transfer Skills   Transfer Comments CGA x 2   Gait   Gait Comments CGA X 2 50 Feet, able to clear L LE on step but very difficult. Painful with ambulation   General Therapy Interventions   Planned Therapy Interventions bed mobility training;gait training;strengthening;transfer training;risk factor education;home program guidelines;progressive activity/exercise   Clinical Impression   Criteria for Skilled Therapeutic Intervention yes, treatment indicated   PT Diagnosis decreased functional mobility status post compression fxs   Influenced by the following impairments pain, spinal prec, decreased strength, decreased ROM   Functional limitations due to impairments decreased bed mob, transfers, ambulation   Clinical Presentation Stable/Uncomplicated   Clinical Presentation Rationale improving    Clinical Decision Making (Complexity) Low complexity   Therapy Frequency Daily   Predicted Duration of Therapy Intervention (days/wks) 1 day   Anticipated Equipment Needs at Discharge  "front wheeled walker   Anticipated Discharge Disposition Home with Home Therapy   Risk & Benefits of therapy have been explained Yes   Patient, Family & other staff in agreement with plan of care Yes   [facility] TM \"6 Clicks\"   2016, Trustees of [facility], under license to [employer].  All rights reserved.   6 Clicks Short Forms Basic Mobility Inpatient Short Form   [facility] AM-PAC  \"6 Clicks\" V.2 Basic Mobility Inpatient Short Form   1. Turning from your back to your side while in a flat bed without using bedrails? 2 - A Lot   2. Moving from lying on your back to sitting on the side of a flat bed without using bedrails? 2 - A Lot   3. Moving to and from a bed to a chair (including a wheelchair)? 2 - A Lot   4. Standing up from a chair using your arms (e.g., wheelchair, or bedside chair)? 2 - A Lot   5. To walk in hospital room? 3 - A Little   6. Climbing 3-5 steps with a railing? 2 - A Lot   Basic Mobility Raw Score (Score out of 24.Lower scores equate to lower levels of function) 13   Total Evaluation Time   Total Evaluation Time (Minutes) 10     "

## 2019-07-23 NOTE — DISCHARGE SUMMARY
[facility]  Hospitalist Discharge Summary       Date of Admission:  7/22/2019  Date of Discharge:  7/23/2019  Discharging Provider: Gee Jo MD      Discharge Diagnoses   Acute back pain    Follow-ups Needed After Discharge   Follow-up Appointments     Follow-up and recommended labs and tests       Follow up with primary care provider, Kayla Delgadillo, within 7 days for   hospital follow- up.  No follow up labs or test are needed. Follow-up with   your oncologist as previously scheduled             Unresulted Labs Ordered in the Past 30 Days of this Admission     No orders found for last 31 day(s).          Discharge Disposition   Discharged to home  Condition at discharge: Stable    Hospital Course   Won Huggins is a Occitan-speaking 78 year old female with a PMH significant for HTN, osteopenia, adenocarcinoma of the lung s/p lobectomy now on palliative chemotherapy (Tarceva), remote history of anal cancer (many years ago, s/p colostomy, inguinal node radiation in 2004), HTN, history of pulmonary embolism, chronic left lower extremity lymphedema, chronic compression fracture of T11 due to osteoporosis, previous CVA without significant residual deficit, who presents with acute back pain.      The patient reportedly bent over while trying to pull open a drawer about 1 week ago and when she stood back up had some generalized acute back pain.  That pain worsened throughout the week, and 3 days ago her daughters brought her to the ER for further evaluation. CT imaging during that evaluation showed stable chronic compression fractures at T11, L1, and L2 with retropulsed fracture fragments resulting in moderate spinal canal stenosis at L2 and mild spinal canal stenosis at T11. Compression fractures have historically been attributed to osteopenia. Observation was offered, but patient was able to ambulate in the ER and preferred to discharge home.  She was sent home with instructions to schedule Tylenol  and given scripts of Robaxin and oxycodone.  Over the weekend her pain was very poorly controlled, and she has been requiring max assist of 2 by family members for mobility.  No radicular symptoms into either leg or red flag symptoms of sensory/motor loss, bowel/bladder dysfunction, or fever.  She has not had any falls or other injuries between her evaluation 3 days ago and today.  He was planning to come to her primary care clinic for follow-up today, but was in too much pain to be able to make it there and instead called an ambulance for transport.     Work up in ED reveals: T 98.2 F, P 78, /79, RR 18, SpO2 92% on room air.  CBC with differential showed normal white count of 8.2, stable hemoglobin of 12.2, and normal platelet count of 197.  BMP was within normal limits including Cr 0.7; non-fasting blood glucose of 133.  Recent UA on 7/19/2019 was negative for nitrites, blood, or leukocyte esterase.  Recent CT imaging of thoracic and lumbar spine obtained on 7/19/2019 showed compression fracture deformities in the thoracic spine similar to imaging on 7/9/2019 and of the lumbar spine dating back to 10/1/2017.  Chronic T11 compression fracture with complete loss of anterior vertebral body height and 3 mm retropulsion of fracture fragments resulting in mild spinal canal stenosis.  Chronic L1 superior endplate compression fracture with superimposed superior endplate Schmorl's node deformity noted as well as a chronic L2 compression fracture with complete loss of anterior vertebral body height and 5 mm retropulsion of fracture fragments resulting in moderate spinal canal stenosis.  No acute fracture progressive subluxation was noted.    Patient was admitted to the Obs Unit. She was given pain and anti-spasmodic for her back pain. She was fitted for a soft brace. She worked with PT and is doing much better. I met with her family. They are comfortable taking her home. I will order home PT and OT. She will go with a  short supply of Oxy and flexeril.    Consultations This Hospital Stay   PHYSICAL THERAPY ADULT IP CONSULT  ORTHOSIS BRACE IP CONSULT    Code Status   Full Code    Time Spent on this Encounter   I, Gee Jo, personally saw the patient today and spent greater than 30 minutes discharging this patient.       Gee Jo MD  [facility]  ______________________________________________________________________    Physical Exam   Vital Signs: Temp: 98.3  F (36.8  C) Temp src: Oral BP: 160/78 Pulse: 58 Heart Rate: 68 Resp: 16 SpO2: 97 % O2 Device: None (Room air)    Weight: 122 lbs 0 oz  Constitutional: awake, alert, cooperative, no apparent distress, and appears stated age  Eyes: Lids and lashes normal, pupils equal, round and reactive to light, extra ocular muscles intact, sclera clear, conjunctiva normal  Respiratory: No increased work of breathing, good air exchange, clear to auscultation bilaterally, no crackles or wheezing  Cardiovascular: Normal apical impulse, regular rate and rhythm, normal S1 and S2, no S3 or S4, and no murmur noted  GI: No scars, normal bowel sounds, soft, non-distended, non-tender, no masses palpated, no hepatosplenomegally  Skin: no bruising or bleeding  Musculoskeletal: no lower extremity pitting edema present       Primary Care Physician   Kayla Delgadillo    Discharge Orders      Home Care PT Referral for Hospital Discharge      Home Care OT Referral for Hospital Discharge      Reason for your hospital stay    Back pain     Follow-up and recommended labs and tests     Follow up with primary care provider, Kayla Delgadillo, within 7 days for hospital follow- up.  No follow up labs or test are needed. Follow-up with your oncologist as previously scheduled     Activity    Your activity upon discharge: activity as tolerated     MD face to face encounter    Documentation of Face to Face and Certification for Home Health Services    I certify that patient: Te Huggins is under my care  and that I, or a nurse practitioner or physician's assistant working with me, had a face-to-face encounter that meets the physician face-to-face encounter requirements with this patient on: 7/23/2019.    This encounter with the patient was in whole, or in part, for the following medical condition, which is the primary reason for home health care: back pain, disc disease.    I certify that, based on my findings, the following services are medically necessary home health services: Occupational Therapy and Physical Therapy.    My clinical findings support the need for the above services because: Occupational Therapy Services are needed to assess and treat cognitive ability and address ADL safety due to impairment in mobility. and Physical Therapy Services are needed to assess and treat the following functional impairments: back pain.    Further, I certify that my clinical findings support that this patient is homebound (i.e. absences from home require considerable and taxing effort and are for medical reasons or Jew services or infrequently or of short duration when for other reasons) because: Requires assistance of another person or specialized equipment to access medical services because patient: Requires supervision of another for safe transfer...    Based on the above findings. I certify that this patient is confined to the home and needs intermittent skilled nursing care, physical therapy and/or speech therapy.  The patient is under my care, and I have initiated the establishment of the plan of care.  This patient will be followed by a physician who will periodically review the plan of care.  Physician/Provider to provide follow up care: Kayla Delgadillo    Attending [facility] physician (the Medicare certified P[facility] provider): Gee Jo  Physician Signature: See electronic signature associated with these discharge orders.  Date: 7/23/2019     Diet    Follow this diet upon discharge: Regular        Significant Results and Procedures   Most Recent 3 CBC's:  Recent Labs   Lab Test 07/23/19  0653 07/22/19  1723 07/19/19  1934   WBC 3.9* 8.2 10.3   HGB 14.1 12.2 12.8   MCV 94 96 94    197 212     Most Recent 3 BMP's:  Recent Labs   Lab Test 07/23/19  0653 07/22/19  1723 07/19/19  1934    137 135   POTASSIUM 4.2 3.5 4.7   CHLORIDE 103 102 102   CO2 29 32 30   BUN 15 16 18   CR 0.58 0.70 0.66   ANIONGAP 7 4 3   STEPHEN 9.5 9.0 9.3   * 133* 109*     Most Recent 2 LFT's:  Recent Labs   Lab Test 07/09/19  1628 05/15/19  1446   AST 32 37   ALT 36 35   ALKPHOS 46 48   BILITOTAL 0.5 0.4     Most Recent Urinalysis:  Recent Labs   Lab Test 07/19/19  1950 10/01/17  0950   COLOR Straw Yellow   APPEARANCE Clear Clear   URINEGLC Negative Negative   URINEBILI Negative Negative   URINEKETONE Negative Negative   SG 1.005 1.006   UBLD Negative Negative   URINEPH 7.0 7.0   PROTEIN Negative Negative   NITRITE Negative Negative   LEUKEST Negative Negative   RBCU  --  <1   WBCU  --  1   ,   Results for orders placed or performed during the hospital encounter of 07/19/19   CT Thoracic Spine w/o Contrast    Narrative    CT THORACIC SPINE WITHOUT CONTRAST, CT LUMBAR SPINE WITHOUT CONTRAST   7/19/2019 8:26 PM    HISTORY: Back pain.    ICD-10:    COMPARISON: Chest CT 7/9/2019, lumbar spine CT 10/1/2017     TECHNIQUE: Using multidetector thin collimation helical acquisition  technique, axial, sagittal and coronal 3 mm thickness CT  reconstructions were obtained through the thoracic and lumbar spine  without intravenous contrast.  Images were viewed in bone and soft  tissue windows.    FINDINGS:  12 thoracic rib-bearing and 5 lumbar-type vertebrae.    Stable alignment of multiple thoracolumbar chronic compression  fracture deformities in the thoracic spine since 7/9/2019 and in the  lumbar spine dating back to 10/1/2017.     Chronic T11 compression fracture with complete loss of anterior  vertebral body height and 3 mm  retropulsion of fracture fragments  resulting in mild spinal canal stenosis. There is ventral bony fusion  T10-T12.    Chronic L1 superior endplate compression fracture with superimposed  superior endplate Schmorl's node deformity resulting in approximately  70-80% loss of anterior vertebral body height. Chronic L2 compression  fracture with complete loss of anterior vertebral body height and 5 mm  retropulsion of fracture fragments resulting in moderate spinal canal  stenosis.    There is associated moderate kyphotic deformity centered at the  thoracolumbar junction with compensatory straightening of the normal  thoracic kyphosis and exaggeration of the lumbar lordosis. Mild convex  right scoliotic curvature of the thoracic spine centered at  approximately T7. No acute fracture or progressive subluxation.  Multilevel thoracolumbar spondylosis with loss of disc height and  facet arthrosis. Moderate neural foraminal stenosis T8-L3. Mild spinal  canal stenosis T10-T12 and L2-L5. Paraspinous soft tissues are  unremarkable.    The bones are osteopenic. Mild degenerative changes of the sacroiliac  joints. Multinodular thyroid gland. Cholelithiasis.      Impression    IMPRESSION:  1. No acute fracture or subluxation of the thoracolumbar spine.  2. Stable chronic compression fractures at T11, L1 and L2 with  associated moderate kyphotic deformity of the thoracolumbar junction  and mild convex right thoracic scoliosis. Retropulsed fracture  fragments result in moderate spinal canal stenosis at L2 and mild  spinal canal stenosis at T11.  3. Multilevel thoracolumbar spondylosis, not significantly changed in  the lumbar spine since 10/1/2017.  4. Osteopenia.      LIZETTE RIZO MD   Lumbar spine CT w/o contrast    Narrative    CT THORACIC SPINE WITHOUT CONTRAST, CT LUMBAR SPINE WITHOUT CONTRAST   7/19/2019 8:26 PM    HISTORY: Back pain.    ICD-10:    COMPARISON: Chest CT 7/9/2019, lumbar spine CT 10/1/2017     TECHNIQUE:  Using multidetector thin collimation helical acquisition  technique, axial, sagittal and coronal 3 mm thickness CT  reconstructions were obtained through the thoracic and lumbar spine  without intravenous contrast.  Images were viewed in bone and soft  tissue windows.    FINDINGS:  12 thoracic rib-bearing and 5 lumbar-type vertebrae.    Stable alignment of multiple thoracolumbar chronic compression  fracture deformities in the thoracic spine since 7/9/2019 and in the  lumbar spine dating back to 10/1/2017.     Chronic T11 compression fracture with complete loss of anterior  vertebral body height and 3 mm retropulsion of fracture fragments  resulting in mild spinal canal stenosis. There is ventral bony fusion  T10-T12.    Chronic L1 superior endplate compression fracture with superimposed  superior endplate Schmorl's node deformity resulting in approximately  70-80% loss of anterior vertebral body height. Chronic L2 compression  fracture with complete loss of anterior vertebral body height and 5 mm  retropulsion of fracture fragments resulting in moderate spinal canal  stenosis.    There is associated moderate kyphotic deformity centered at the  thoracolumbar junction with compensatory straightening of the normal  thoracic kyphosis and exaggeration of the lumbar lordosis. Mild convex  right scoliotic curvature of the thoracic spine centered at  approximately T7. No acute fracture or progressive subluxation.  Multilevel thoracolumbar spondylosis with loss of disc height and  facet arthrosis. Moderate neural foraminal stenosis T8-L3. Mild spinal  canal stenosis T10-T12 and L2-L5. Paraspinous soft tissues are  unremarkable.    The bones are osteopenic. Mild degenerative changes of the sacroiliac  joints. Multinodular thyroid gland. Cholelithiasis.      Impression    IMPRESSION:  1. No acute fracture or subluxation of the thoracolumbar spine.  2. Stable chronic compression fractures at T11, L1 and L2 with  associated  moderate kyphotic deformity of the thoracolumbar junction  and mild convex right thoracic scoliosis. Retropulsed fracture  fragments result in moderate spinal canal stenosis at L2 and mild  spinal canal stenosis at T11.  3. Multilevel thoracolumbar spondylosis, not significantly changed in  the lumbar spine since 10/1/2017.  4. Osteopenia.      LIZETTE RIZO MD       Discharge Medications   Current Discharge Medication List      START taking these medications    Details   cyclobenzaprine (FLEXERIL) 5 MG tablet Take 1 tablet (5 mg) by mouth 3 times daily as needed for muscle spasms  Qty: 15 tablet, Refills: 0    Associated Diagnoses: Acute low back pain, unspecified back pain laterality, with sciatica presence unspecified      polyethylene glycol (MIRALAX/GLYCOLAX) packet Take 17 g by mouth daily as needed for constipation    Associated Diagnoses: Drug-induced constipation      senna-docusate (SENOKOT-S/PERICOLACE) 8.6-50 MG tablet Take 1 tablet by mouth daily as needed for constipation  Qty: 15 tablet, Refills: 0    Associated Diagnoses: Drug-induced constipation         CONTINUE these medications which have CHANGED    Details   oxyCODONE (ROXICODONE) 5 MG tablet Take 0.5-1 tablets (2.5-5 mg) by mouth every 6 hours as needed for pain  Qty: 15 tablet, Refills: 0    Associated Diagnoses: Acute low back pain, unspecified back pain laterality, with sciatica presence unspecified         CONTINUE these medications which have NOT CHANGED    Details   Acetaminophen (TYLENOL PO) Take 325-650 mg by mouth every 4 hours as needed for mild pain or fever      ASPIRIN EC PO Take 81 mg by mouth daily      atorvastatin (LIPITOR) 10 MG tablet Take 1 tablet (10 mg) by mouth daily  Qty: 30 tablet, Refills: 0    Associated Diagnoses: NSTEMI (non-ST elevated myocardial infarction) (H)      cetirizine (ZYRTEC) 10 MG tablet Take 10 mg by mouth daily as needed       erlotinib (TARCEVA) 100 MG TABS Take 100 mg by mouth every other day        fish oil-omega-3 fatty acids 1000 MG capsule Take 1 g by mouth daily      fluticasone (FLONASE) 50 MCG/ACT spray Spray 2 sprays into both nostrils daily as needed for rhinitis or allergies      Ginsengs-Royal Jelly-Vit B12 (GINSENG ROYAL JELLY PLUS PO) Take 1 capsule by mouth daily Chinese OTC med       guaiFENesin-codeine (ROBITUSSIN AC) 100-10 MG/5ML solution Take 1-2 tsp. by mouth every 6 hours as needed for cough      hydrochlorothiazide (MICROZIDE) 12.5 MG capsule Take 12.5 mg by mouth daily       Lidocaine-Menthol 3.99-1 % PTCH Externally apply topically daily as needed      LOSARTAN POTASSIUM PO Take 25 mg by mouth daily       methocarbamol (ROBAXIN) 500 MG tablet Take 1.5 tablets (750 mg) by mouth 3 times daily as needed for muscle spasms  Qty: 15 tablet, Refills: 0      VITAMIN D, CHOLECALCIFEROL, PO Take 1,000 Units by mouth daily       albuterol (PROAIR HFA/PROVENTIL HFA/VENTOLIN HFA) 108 (90 BASE) MCG/ACT Inhaler Inhale 1-2 puffs into the lungs every 4 hours as needed for shortness of breath / dyspnea or wheezing           Allergies   No Known Allergies

## 2019-07-23 NOTE — PROGRESS NOTES
Patient seen at bedside with family/ present for fitting of LSO corset.  Binder modified to accommodate patients ostomy bag.  Patient to wear corset when up and out of bed for increased support and pain management.  To contact the office with any further questions or concerns.  Obed Daigle ,LPO

## 2019-07-23 NOTE — PLAN OF CARE
Acute Traumatic Pain     1.  Pain controlled with oral analgesia: Yes       2.  Vital signs stable: Yes       3.  Diagnotic testing complete: Yes       4.  Cleared from consultants (if applicable): No- waiting for PT recommendation's to be placed        5. Return to near baseline physical activity: No     Pt is Bulgarian speaking-  here till noon. Granddaughter present. Daughter now also present.  Pt has ostomy- soft stool now present. Pt x2 with gb and walker.PT saw- waiting for recommendations in [facility]. Orthodics dropped off brace. Patient getting scheduled tylenol and lidoderm patches. Prn flexiril and oxy. Pt given flexiril this morning with morning meds. Purewick in place.

## 2019-11-05 ENCOUNTER — TRANSFERRED RECORDS (OUTPATIENT)
Dept: HEALTH INFORMATION MANAGEMENT | Facility: CLINIC | Age: 79
End: 2019-11-05

## 2019-11-08 ENCOUNTER — TRANSFERRED RECORDS (OUTPATIENT)
Dept: HEALTH INFORMATION MANAGEMENT | Facility: CLINIC | Age: 79
End: 2019-11-08

## 2019-11-14 ENCOUNTER — HOSPITAL ENCOUNTER (EMERGENCY)
Facility: CLINIC | Age: 79
End: 2019-11-14

## 2019-11-14 ENCOUNTER — HOSPITAL ENCOUNTER (EMERGENCY)
Facility: CLINIC | Age: 79
Discharge: HOME | End: 2019-11-14
Attending: EMERGENCY MEDICINE | Admitting: EMERGENCY MEDICINE
Payer: COMMERCIAL

## 2019-11-14 ENCOUNTER — APPOINTMENT (OUTPATIENT)
Dept: CT IMAGING | Facility: CLINIC | Age: 79
End: 2019-11-14
Attending: EMERGENCY MEDICINE
Payer: COMMERCIAL

## 2019-11-14 VITALS
DIASTOLIC BLOOD PRESSURE: 84 MMHG | SYSTOLIC BLOOD PRESSURE: 152 MMHG | TEMPERATURE: 97.7 F | OXYGEN SATURATION: 94 % | HEART RATE: 73 BPM

## 2019-11-14 DIAGNOSIS — R07.89 CHEST WALL PAIN: ICD-10-CM

## 2019-11-14 DIAGNOSIS — S22.41XA CLOSED FRACTURE OF MULTIPLE RIBS OF RIGHT SIDE, INITIAL ENCOUNTER: ICD-10-CM

## 2019-11-14 LAB
ALBUMIN SERPL-MCNC: 3.3 G/DL (ref 3.4–5)
ALP SERPL-CCNC: 75 U/L (ref 40–150)
ALT SERPL W P-5'-P-CCNC: 25 U/L (ref 0–50)
ANION GAP SERPL CALCULATED.3IONS-SCNC: 4 MMOL/L (ref 3–14)
AST SERPL W P-5'-P-CCNC: 28 U/L (ref 0–45)
BASOPHILS # BLD AUTO: 0 10E9/L (ref 0–0.2)
BASOPHILS NFR BLD AUTO: 0.4 %
BILIRUB SERPL-MCNC: 0.6 MG/DL (ref 0.2–1.3)
BUN SERPL-MCNC: 17 MG/DL (ref 7–30)
CALCIUM SERPL-MCNC: 9.5 MG/DL (ref 8.5–10.1)
CHLORIDE SERPL-SCNC: 100 MMOL/L (ref 94–109)
CO2 SERPL-SCNC: 31 MMOL/L (ref 20–32)
CREAT SERPL-MCNC: 0.7 MG/DL (ref 0.52–1.04)
DIFFERENTIAL METHOD BLD: NORMAL
EOSINOPHIL # BLD AUTO: 0.2 10E9/L (ref 0–0.7)
EOSINOPHIL NFR BLD AUTO: 2.7 %
ERYTHROCYTE [DISTWIDTH] IN BLOOD BY AUTOMATED COUNT: 12.1 % (ref 10–15)
GFR SERPL CREATININE-BSD FRML MDRD: 82 ML/MIN/{1.73_M2}
GLUCOSE SERPL-MCNC: 104 MG/DL (ref 70–99)
HCT VFR BLD AUTO: 38.3 % (ref 35–47)
HGB BLD-MCNC: 12.5 G/DL (ref 11.7–15.7)
IMM GRANULOCYTES # BLD: 0 10E9/L (ref 0–0.4)
IMM GRANULOCYTES NFR BLD: 0.3 %
LYMPHOCYTES # BLD AUTO: 1.4 10E9/L (ref 0.8–5.3)
LYMPHOCYTES NFR BLD AUTO: 18.1 %
MCH RBC QN AUTO: 30.6 PG (ref 26.5–33)
MCHC RBC AUTO-ENTMCNC: 32.6 G/DL (ref 31.5–36.5)
MCV RBC AUTO: 94 FL (ref 78–100)
MONOCYTES # BLD AUTO: 0.7 10E9/L (ref 0–1.3)
MONOCYTES NFR BLD AUTO: 9.3 %
NEUTROPHILS # BLD AUTO: 5.3 10E9/L (ref 1.6–8.3)
NEUTROPHILS NFR BLD AUTO: 69.2 %
NRBC # BLD AUTO: 0 10*3/UL
NRBC BLD AUTO-RTO: 0 /100
PLATELET # BLD AUTO: 201 10E9/L (ref 150–450)
POTASSIUM SERPL-SCNC: 3.7 MMOL/L (ref 3.4–5.3)
PROT SERPL-MCNC: 7.7 G/DL (ref 6.8–8.8)
RBC # BLD AUTO: 4.09 10E12/L (ref 3.8–5.2)
SODIUM SERPL-SCNC: 135 MMOL/L (ref 133–144)
WBC # BLD AUTO: 7.7 10E9/L (ref 4–11)

## 2019-11-14 PROCEDURE — 80053 COMPREHEN METABOLIC PANEL: CPT | Performed by: EMERGENCY MEDICINE

## 2019-11-14 PROCEDURE — 71250 CT THORAX DX C-: CPT

## 2019-11-14 PROCEDURE — 25000132 ZZH RX MED GY IP 250 OP 250 PS 637: Performed by: EMERGENCY MEDICINE

## 2019-11-14 PROCEDURE — 96374 THER/PROPH/DIAG INJ IV PUSH: CPT

## 2019-11-14 PROCEDURE — 99285 EMERGENCY DEPT VISIT HI MDM: CPT | Mod: 25

## 2019-11-14 PROCEDURE — 25000128 H RX IP 250 OP 636: Performed by: EMERGENCY MEDICINE

## 2019-11-14 PROCEDURE — 85025 COMPLETE CBC W/AUTO DIFF WBC: CPT | Performed by: EMERGENCY MEDICINE

## 2019-11-14 RX ORDER — MORPHINE SULFATE 4 MG/ML
4 INJECTION, SOLUTION INTRAMUSCULAR; INTRAVENOUS
Status: DISCONTINUED | OUTPATIENT
Start: 2019-11-14 | End: 2019-11-15 | Stop reason: HOSPADM

## 2019-11-14 RX ORDER — OXYCODONE AND ACETAMINOPHEN 5; 325 MG/1; MG/1
2 TABLET ORAL ONCE
Status: COMPLETED | OUTPATIENT
Start: 2019-11-14 | End: 2019-11-14

## 2019-11-14 RX ORDER — OXYCODONE AND ACETAMINOPHEN 5; 325 MG/1; MG/1
1-2 TABLET ORAL EVERY 6 HOURS PRN
Qty: 12 TABLET | Refills: 0 | Status: SHIPPED | OUTPATIENT
Start: 2019-11-14

## 2019-11-14 RX ORDER — IBUPROFEN 600 MG/1
600 TABLET, FILM COATED ORAL EVERY 8 HOURS PRN
Qty: 30 TABLET | Refills: 0 | Status: SHIPPED | OUTPATIENT
Start: 2019-11-14 | End: 2019-12-14

## 2019-11-14 RX ADMIN — MORPHINE SULFATE 4 MG: 4 INJECTION INTRAVENOUS at 18:15

## 2019-11-14 RX ADMIN — OXYCODONE HYDROCHLORIDE AND ACETAMINOPHEN 2 TABLET: 5; 325 TABLET ORAL at 21:12

## 2019-11-14 ASSESSMENT — ENCOUNTER SYMPTOMS
APPETITE CHANGE: 1
VOMITING: 0
SHORTNESS OF BREATH: 1
MYALGIAS: 1
ACTIVITY CHANGE: 0
NAUSEA: 0
DIARRHEA: 0
COUGH: 1
BACK PAIN: 1
FEVER: 0
ABDOMINAL PAIN: 1

## 2019-11-14 NOTE — ED TRIAGE NOTES
Pt c/o back pain and bilateral rib pain that started last night [facility]. Pt states that today she started having chest pain and difficulty breathing. Recent dx of spontaneous rib fractures. Pt given 100 of fentanyl via EMS for pain. Pain 8/10

## 2019-11-14 NOTE — ED PROVIDER NOTES
History     Chief Complaint:  Back Pain and Chest Pain    The history is limited by a language barrier. A  was used.     Won Huggins is a 79 year old female with a history of rib fractures reportedly diagnosed recently during prior visit at outside hospital who presents to the emergency department today for evaluation of persistent mid-upper back pain and bilateral rib pain despite using Tramadol and lidocaine patches at home. She has not had any injuries or falls to cause these fractures but her daughter does note the patient has been more active with going to the [facility] recently and her pain began worsening after returning from there last night. She feels her pain today is similar to her previous clinic visit at [facility]. Her daughter reports the patient has had a cough and that her back pain is worse with this but she also has pain in her chest and upper abdomen when she coughs. With this the patient has had a decreased appetite per her daughter. She denies any fever, nausea, vomiting, or any pain in her extremities. For her pain she has been taking Tylenol and Tramadol, last at 1500 and 1300 respectively. She had a CT chest done recently with results listed below.  Family report that the patient denies falling or having any other injuries in the cause of her fractures is unknown.    CT Chest w IV contrast 11/8/19  IMPRESSION:  1 No plpeural effusion. No evidence of recurrent or metastatic disease in the chest   Reading per radiology    Allergies:  No known drug allergies    Medications:    Tramadol  Lidocaine patch  Acetaminophen  Aspirin 81 mg  Tarceva  Hydrochlorothiazide  Losartan  Atorvastatin  Flexeril      Past Medical History:    Hepatitis C  Allergic rhinitis  Carpal tunnel syndrome  Insomnia  Colostomy  Essential hypertension  Lymphedema  Multiple compression fractures  Osteoporosis  Rectal cancer  Pulmonary embolism  Rib fractures    Past Surgical History:    Colostomy  Lung  lobectomy    Family History:    History reviewed. No pertinent family history.     Social History:  The patient arrives with her daugher  Lives with daughter    Review of Systems   Constitutional: Positive for appetite change. Negative for activity change and fever.   Respiratory: Positive for cough and shortness of breath.    Cardiovascular: Positive for chest pain.   Gastrointestinal: Positive for abdominal pain. Negative for diarrhea, nausea and vomiting.   Musculoskeletal: Positive for back pain and myalgias.   All other systems reviewed and are negative.      Physical Exam     Patient Vitals for the past 24 hrs:   BP Temp Temp src Pulse Heart Rate SpO2   11/14/19 2145 (!) 152/84 -- -- 73 -- 94 %   11/14/19 2130 138/81 -- -- 72 -- 96 %   11/14/19 2115 (!) 162/88 -- -- 72 -- 98 %   11/14/19 2100 (!) 160/77 -- -- 70 -- 98 %   11/14/19 2045 (!) 152/79 -- -- 69 -- 97 %   11/14/19 2030 (!) 138/90 -- -- 73 -- 90 %   11/14/19 2015 (!) 141/82 -- -- 72 -- 93 %   11/14/19 2000 (!) 151/92 -- -- 69 -- 99 %   11/14/19 1945 136/89 -- -- 75 -- 92 %   11/14/19 1930 (!) 147/89 -- -- 73 -- 95 %   11/14/19 1915 (!) 156/88 -- -- 68 -- 100 %   11/14/19 1900 (!) 150/84 -- -- 74 -- 91 %   11/14/19 1830 (!) 150/82 -- -- 79 -- 97 %   11/14/19 1815 (!) 169/96 -- -- 74 -- 96 %   11/14/19 1814 -- 97.7  F (36.5  C) Oral -- -- --   11/14/19 1800 (!) 156/103 -- -- 78 -- 99 %   11/14/19 1749 (!) 189/93 -- Oral -- 75 97 %     Physical Exam  General: Well appearing, nontoxic. Resting comfortably  Head:  Scalp, face, and head appear normal, atraumatic   Eyes:  Pupils are equal, round, and reactive to light, EOMI, no nystagmus     Conjunctivae non-injected and sclerae white  ENT:    The external nose is normal    Pinnae are normal    The oropharynx is normal, mucous membranes moist    Uvula is in the midline  Neck:  Normal range of motion. Cervical spine nontender, no stepoffs     There is no rigidity noted    Trachea is in the  midline  CV:  Regular rate and rhythm     Normal S1/S2, no S3/S4    No murmur or rub. Radial pulses 2+ bilaterally   Resp:  Lungs are clear and equal bilaterally    There is no tachypnea    No increased work of breathing    No rales, wheezing, or rhonchi  GI:  Abdomen is soft, no rigidity or guarding    No distension, or mass.  Left lower quadrant ostomy present with normal-appearing stool and gas in the appliance.  No blood.    No tenderness or rebound tenderness   MS:  Normal muscular tone    Patient with significant tenderness to palpation over the left posterior lateral upper thorax with mild soft tissue prominence no crepitus or obvious deformity.  No erythema induration or warmth.  Patient has multiple lidocaine patches across the left upper and bilateral mid back    Symmetric motor strength.  Anterior chest wall is normal and nontender to palpation.  Patient exhibits pain with sitting upright.    No lower extremity edema  Skin:  No rash or acute skin lesions noted  Neuro: Awake and alert no facial droop.    Speech is normal and fluent, per daughter.    Strength 5 out of 5 and intact throughout.  Sensation intact light touch throughout.    Moves all extremities spontaneously  Psych:  Normal affect.  Appropriate interactions.    Emergency Department Course     Imaging:  Radiology findings were communicated with the patient who voiced understanding of the findings.  Chest CT wo contrast   IMPRESSION:   1.  No findings to explain patient's left upper back pain.  2.  Acute or subacute right fifth and sixth rib fractures anteriorly.  3.  Severe vertebral compression deformity at T9 and T11, moderate at T6  Reading per radiology    Laboratory:  Laboratory findings were communicated with the patient who voiced understanding of the findings.    CBC:  o/w WNL. (WBC 7.7, HGB 12.5, )   CMP: Glucose 104 (H), o/w WNL (Creatinine: 0.70)    Interventions:  1815 Morphine, 4 mg, IV  Medications   oxyCODONE-acetaminophen  (PERCOCET) 5-325 MG per tablet 2 tablet (2 tablets Oral Given 11/14/19 2112)        Emergency Department Course:  Past medical records, nursing notes, and vitals reviewed.  1749: I performed an exam of the patient and obtained history, as documented above.     IV was inserted and blood was drawn for laboratory testing, results above.  The patient was sent for a CT Chest while in the emergency department, findings above.    2015: I rechecked the patient. Explained findings to patient.    I personally reviewed the laboratory/imaging results with the Patient and daughter and answered all related questions prior to discharge.    2135: I rechecked the patient.  Findings and plan explained to the Patient and daughter. Patient discharged home with instructions regarding supportive care, medications, and reasons to return. The importance of close follow-up was reviewed.     Impression & Plan        Medical Decision Making:    Won Huggins is a 79 year old female who presents for evaluation of persistent chest and back pain.  In the setting of known multiple rib fractures and compression fractures.  Patient and family both deny any known trauma or injuries.  Is not clear the reason behind her fractures.  She does have osteoporosis which may put her at increased risk for spontaneous fracture with coughing or minor injury.  On my evaluation she is well-appearing hemodynamically stable.  She is painful with movement but comfortable at rest.  A broad differential diagnosis is considered including pneumothorax, hemothorax, pulmonary contusion, pulmonary edema, pneumonia, persistent pain related to underlying known musculoskeletal fractures.  She has no hypoxia significant increased work of breathing or other signs of respiratory failure.  Initially there was confusion about the patient's name and she was registered under an incorrect name therefore I did not initially have access to any old records and family was really unclear  regarding her medical situation and history.  Given this work-up in the emergency department was performed.  CT scan of the chest reveals multiple bilateral rib fractures as well as compression fractures of the spine which are indeterminate in age.  No other underlying complications such as those listed listed above.  Lab studies are unremarkable.  No evidence of infection.  The patient's pain was treated and I had a long discussion with the patient's family regarding pain from multiple underlying fractures.  We discussed that this can take 4 to 6 weeks to fully heal and that she will likely have some degree of underlying pain during this time.  I recommended very close follow-up with primary care provider and/or pain clinic.  Patient is at high risk for opiate use due to her age and fall risk.  I recommended continuing with Tylenol ibuprofen lidocaine patches and judicious use of opiates when needed.  No indication for admission at this time.  Patient was provided with an incentive spirometer and instructed on its use by nursing staff.  Close return precautions were provided.  The patient has good supportive care with multiple family members at home.  She does use a walker to ambulate.  I recommended l limiting any lifting bending or twisting.  Patient and daughters were agreeable to plan of care and the patient was discharged in stable condition.    Diagnosis:    ICD-10-CM   1. Closed fracture of multiple ribs of right side, initial encounter S22.41XA   2. Chest wall pain R07.89       Disposition:   discharged to home    Discharge Medications:     Medication List      Started    ibuprofen 600 MG tablet  Commonly known as:  ADVIL/MOTRIN  600 mg, Oral, EVERY 8 HOURS PRN     oxyCODONE-acetaminophen 5-325 MG tablet  Commonly known as:  PERCOCET  1-2 tablets, Oral, EVERY 6 HOURS PRN            Scribe Disclosure:  Jen NOBLE, am serving as a scribe at 5:49 PM on 11/14/2019 to document services personally performed by  Ceferino Joyner MD based on my observations and the provider's statements to me.    [facility] EMERGENCY DEPARTMENT       Ceferino Joyner MD  11/15/19 9440

## 2019-11-14 NOTE — ED AVS SNAPSHOT
[facility] Emergency Department  [address]  [facility] [address]  Phone:  [phone]  Fax:  [phone]                                    Won Huggins   MRN: [id number]    Department:  [facility] Emergency Department   Date of Visit:  11/14/2019           After Visit Summary Signature Page    I have received my discharge instructions, and my questions have been answered. I have discussed any challenges I see with this plan with the nurse or doctor.    ..........................................................................................................................................  Patient/Patient Representative Signature      ..........................................................................................................................................  Patient Representative Print Name and Relationship to Patient    ..................................................               ................................................  Date                                   Time    ..........................................................................................................................................  Reviewed by Signature/Title    ...................................................              ..............................................  Date                                               Time          22EPIC Rev 08/18

## 2023-03-30 NOTE — ED NOTES
Pt requesting to eat, MD advised and stated that was ok. Pt assisted into a sitting position. Denies any other needs at this time.    - - -

## 2024-10-24 NOTE — PLAN OF CARE
Problem: Discharge Planning  Goal: Discharge Planning (Adult, OB, Behavioral, Peds)  Outcome: Improving  Problem: Discharge Planning  Goal: Discharge Planning (Adult, OB, Behavioral, Peds)  Outcome: No Change  PRIMARY DIAGNOSIS: Acute Traumatic Pain  OUTPATIENT/OBSERVATION GOALS TO BE MET BEFORE DISCHARGE:      1. Tolerate PO Intake: Yes  2. Cleared for discharge from consultants involved: No - orthopedic, PT  3. ADLs back to baseline? No - at baseline needs walker, and sometimes help at home with mobility   4. Activity and level of assistance: Assist of 2 and walker  5. Pain status: Improved-controlled with oral pain medications, home percocet given   6. Barriers to discharge noted Yes - lives with daughter who isn't always home,  is able to help a little with cares      Pt. Up to commode with assist of 2 and 2WW, pt. Reports numbness from ankle to toes - improved from this morning. Pt's pain goal is 6/10, pt. Reports pain is improved but still 7-8/10 after oral NORCO and ibuprofen, hydroxizine given            Call primary care provider for follow up after discharge/Activities as tolerated/Low salt diet/Monitor weight daily/Report signs and symptoms to primary care provider